# Patient Record
Sex: MALE | Race: WHITE | NOT HISPANIC OR LATINO | URBAN - METROPOLITAN AREA
[De-identification: names, ages, dates, MRNs, and addresses within clinical notes are randomized per-mention and may not be internally consistent; named-entity substitution may affect disease eponyms.]

---

## 2019-07-04 ENCOUNTER — INPATIENT (INPATIENT)
Facility: HOSPITAL | Age: 84
LOS: 4 days | Discharge: HOME HEALTH | End: 2019-07-09
Attending: INTERNAL MEDICINE | Admitting: INTERNAL MEDICINE
Payer: MEDICARE

## 2019-07-04 VITALS — WEIGHT: 184.97 LBS | HEIGHT: 68 IN

## 2019-07-04 LAB
ALBUMIN SERPL ELPH-MCNC: 3 G/DL — LOW (ref 3.3–5)
ALP SERPL-CCNC: 157 U/L — HIGH (ref 40–120)
ALT FLD-CCNC: 28 U/L — SIGNIFICANT CHANGE UP (ref 12–78)
ANION GAP SERPL CALC-SCNC: 8 MMOL/L — SIGNIFICANT CHANGE UP (ref 5–17)
APPEARANCE UR: CLEAR — SIGNIFICANT CHANGE UP
APTT BLD: 46.1 SEC — HIGH (ref 27.5–36.3)
AST SERPL-CCNC: 27 U/L — SIGNIFICANT CHANGE UP (ref 15–37)
BASOPHILS # BLD AUTO: 0.01 K/UL — SIGNIFICANT CHANGE UP (ref 0–0.2)
BASOPHILS NFR BLD AUTO: 0.1 % — SIGNIFICANT CHANGE UP (ref 0–2)
BILIRUB SERPL-MCNC: 0.9 MG/DL — SIGNIFICANT CHANGE UP (ref 0.2–1.2)
BILIRUB UR-MCNC: NEGATIVE — SIGNIFICANT CHANGE UP
BUN SERPL-MCNC: 18 MG/DL — SIGNIFICANT CHANGE UP (ref 7–23)
CALCIUM SERPL-MCNC: 9.3 MG/DL — SIGNIFICANT CHANGE UP (ref 8.5–10.1)
CHLORIDE SERPL-SCNC: 101 MMOL/L — SIGNIFICANT CHANGE UP (ref 96–108)
CO2 SERPL-SCNC: 27 MMOL/L — SIGNIFICANT CHANGE UP (ref 22–31)
COLOR SPEC: YELLOW — SIGNIFICANT CHANGE UP
CREAT SERPL-MCNC: 1.21 MG/DL — SIGNIFICANT CHANGE UP (ref 0.5–1.3)
DIFF PNL FLD: ABNORMAL
EOSINOPHIL # BLD AUTO: 0.01 K/UL — SIGNIFICANT CHANGE UP (ref 0–0.5)
EOSINOPHIL NFR BLD AUTO: 0.1 % — SIGNIFICANT CHANGE UP (ref 0–6)
ERYTHROCYTE [SEDIMENTATION RATE] IN BLOOD: 90 MM/HR — HIGH (ref 0–20)
GLUCOSE SERPL-MCNC: 114 MG/DL — HIGH (ref 70–99)
GLUCOSE UR QL: NEGATIVE MG/DL — SIGNIFICANT CHANGE UP
HCT VFR BLD CALC: 37.7 % — LOW (ref 39–50)
HGB BLD-MCNC: 12.4 G/DL — LOW (ref 13–17)
IMM GRANULOCYTES NFR BLD AUTO: 0.4 % — SIGNIFICANT CHANGE UP (ref 0–1.5)
INR BLD: 3.08 RATIO — HIGH (ref 0.88–1.16)
KETONES UR-MCNC: NEGATIVE — SIGNIFICANT CHANGE UP
LACTATE SERPL-SCNC: 1.2 MMOL/L — SIGNIFICANT CHANGE UP (ref 0.7–2)
LEUKOCYTE ESTERASE UR-ACNC: NEGATIVE — SIGNIFICANT CHANGE UP
LYMPHOCYTES # BLD AUTO: 1.19 K/UL — SIGNIFICANT CHANGE UP (ref 1–3.3)
LYMPHOCYTES # BLD AUTO: 11.9 % — LOW (ref 13–44)
MCHC RBC-ENTMCNC: 28.8 PG — SIGNIFICANT CHANGE UP (ref 27–34)
MCHC RBC-ENTMCNC: 32.9 GM/DL — SIGNIFICANT CHANGE UP (ref 32–36)
MCV RBC AUTO: 87.7 FL — SIGNIFICANT CHANGE UP (ref 80–100)
MONOCYTES # BLD AUTO: 1.03 K/UL — HIGH (ref 0–0.9)
MONOCYTES NFR BLD AUTO: 10.3 % — SIGNIFICANT CHANGE UP (ref 2–14)
NEUTROPHILS # BLD AUTO: 7.68 K/UL — HIGH (ref 1.8–7.4)
NEUTROPHILS NFR BLD AUTO: 77.2 % — HIGH (ref 43–77)
NITRITE UR-MCNC: NEGATIVE — SIGNIFICANT CHANGE UP
PH UR: 6 — SIGNIFICANT CHANGE UP (ref 5–8)
PLATELET # BLD AUTO: 222 K/UL — SIGNIFICANT CHANGE UP (ref 150–400)
POTASSIUM SERPL-MCNC: 3.4 MMOL/L — LOW (ref 3.5–5.3)
POTASSIUM SERPL-SCNC: 3.4 MMOL/L — LOW (ref 3.5–5.3)
PROT SERPL-MCNC: 8.2 GM/DL — SIGNIFICANT CHANGE UP (ref 6–8.3)
PROT UR-MCNC: 30 MG/DL
PROTHROM AB SERPL-ACNC: 35.4 SEC — HIGH (ref 10–12.9)
RBC # BLD: 4.3 M/UL — SIGNIFICANT CHANGE UP (ref 4.2–5.8)
RBC # FLD: 13.5 % — SIGNIFICANT CHANGE UP (ref 10.3–14.5)
SODIUM SERPL-SCNC: 136 MMOL/L — SIGNIFICANT CHANGE UP (ref 135–145)
SP GR SPEC: 1.01 — SIGNIFICANT CHANGE UP (ref 1.01–1.02)
UROBILINOGEN FLD QL: NEGATIVE MG/DL — SIGNIFICANT CHANGE UP
WBC # BLD: 9.96 K/UL — SIGNIFICANT CHANGE UP (ref 3.8–10.5)
WBC # FLD AUTO: 9.96 K/UL — SIGNIFICANT CHANGE UP (ref 3.8–10.5)

## 2019-07-04 PROCEDURE — 71260 CT THORAX DX C+: CPT | Mod: 26

## 2019-07-04 PROCEDURE — 73702 CT LWR EXTREMITY W/O&W/DYE: CPT | Mod: 26,LT

## 2019-07-04 PROCEDURE — 73562 X-RAY EXAM OF KNEE 3: CPT | Mod: 26,LT

## 2019-07-04 PROCEDURE — 71046 X-RAY EXAM CHEST 2 VIEWS: CPT | Mod: 26

## 2019-07-04 PROCEDURE — 99285 EMERGENCY DEPT VISIT HI MDM: CPT

## 2019-07-04 PROCEDURE — 73552 X-RAY EXAM OF FEMUR 2/>: CPT | Mod: 26,LT

## 2019-07-04 PROCEDURE — 73502 X-RAY EXAM HIP UNI 2-3 VIEWS: CPT | Mod: 26,LT

## 2019-07-04 PROCEDURE — 93010 ELECTROCARDIOGRAM REPORT: CPT

## 2019-07-04 PROCEDURE — 74177 CT ABD & PELVIS W/CONTRAST: CPT | Mod: 26

## 2019-07-04 PROCEDURE — 73590 X-RAY EXAM OF LOWER LEG: CPT | Mod: 26,LT

## 2019-07-04 RX ORDER — TRAMADOL HYDROCHLORIDE 50 MG/1
75 TABLET ORAL EVERY 6 HOURS
Refills: 0 | Status: DISCONTINUED | OUTPATIENT
Start: 2019-07-04 | End: 2019-07-09

## 2019-07-04 RX ORDER — ACETAMINOPHEN 500 MG
650 TABLET ORAL EVERY 4 HOURS
Refills: 0 | Status: DISCONTINUED | OUTPATIENT
Start: 2019-07-04 | End: 2019-07-06

## 2019-07-04 RX ORDER — ACETAMINOPHEN 500 MG
975 TABLET ORAL ONCE
Refills: 0 | Status: COMPLETED | OUTPATIENT
Start: 2019-07-04 | End: 2019-07-04

## 2019-07-04 RX ORDER — ONDANSETRON 8 MG/1
4 TABLET, FILM COATED ORAL EVERY 6 HOURS
Refills: 0 | Status: DISCONTINUED | OUTPATIENT
Start: 2019-07-04 | End: 2019-07-09

## 2019-07-04 RX ORDER — OXYCODONE HYDROCHLORIDE 5 MG/1
5 TABLET ORAL EVERY 4 HOURS
Refills: 0 | Status: DISCONTINUED | OUTPATIENT
Start: 2019-07-04 | End: 2019-07-09

## 2019-07-04 RX ORDER — MORPHINE SULFATE 50 MG/1
4 CAPSULE, EXTENDED RELEASE ORAL ONCE
Refills: 0 | Status: DISCONTINUED | OUTPATIENT
Start: 2019-07-04 | End: 2019-07-04

## 2019-07-04 RX ORDER — DOCUSATE SODIUM 100 MG
100 CAPSULE ORAL THREE TIMES A DAY
Refills: 0 | Status: DISCONTINUED | OUTPATIENT
Start: 2019-07-04 | End: 2019-07-09

## 2019-07-04 RX ORDER — VANCOMYCIN HCL 1 G
1000 VIAL (EA) INTRAVENOUS ONCE
Refills: 0 | Status: COMPLETED | OUTPATIENT
Start: 2019-07-04 | End: 2019-07-04

## 2019-07-04 RX ORDER — SODIUM CHLORIDE 9 MG/ML
2500 INJECTION INTRAMUSCULAR; INTRAVENOUS; SUBCUTANEOUS ONCE
Refills: 0 | Status: COMPLETED | OUTPATIENT
Start: 2019-07-04 | End: 2019-07-04

## 2019-07-04 RX ORDER — SENNA PLUS 8.6 MG/1
2 TABLET ORAL AT BEDTIME
Refills: 0 | Status: DISCONTINUED | OUTPATIENT
Start: 2019-07-04 | End: 2019-07-09

## 2019-07-04 RX ORDER — PIPERACILLIN AND TAZOBACTAM 4; .5 G/20ML; G/20ML
3.38 INJECTION, POWDER, LYOPHILIZED, FOR SOLUTION INTRAVENOUS ONCE
Refills: 0 | Status: COMPLETED | OUTPATIENT
Start: 2019-07-04 | End: 2019-07-04

## 2019-07-04 RX ADMIN — PIPERACILLIN AND TAZOBACTAM 3.38 GRAM(S): 4; .5 INJECTION, POWDER, LYOPHILIZED, FOR SOLUTION INTRAVENOUS at 21:00

## 2019-07-04 RX ADMIN — PIPERACILLIN AND TAZOBACTAM 200 GRAM(S): 4; .5 INJECTION, POWDER, LYOPHILIZED, FOR SOLUTION INTRAVENOUS at 20:41

## 2019-07-04 RX ADMIN — MORPHINE SULFATE 4 MILLIGRAM(S): 50 CAPSULE, EXTENDED RELEASE ORAL at 20:41

## 2019-07-04 RX ADMIN — Medication 250 MILLIGRAM(S): at 21:24

## 2019-07-04 RX ADMIN — MORPHINE SULFATE 4 MILLIGRAM(S): 50 CAPSULE, EXTENDED RELEASE ORAL at 21:00

## 2019-07-04 RX ADMIN — Medication 975 MILLIGRAM(S): at 20:30

## 2019-07-04 RX ADMIN — SODIUM CHLORIDE 2500 MILLILITER(S): 9 INJECTION INTRAMUSCULAR; INTRAVENOUS; SUBCUTANEOUS at 20:12

## 2019-07-04 RX ADMIN — Medication 975 MILLIGRAM(S): at 20:31

## 2019-07-04 NOTE — H&P ADULT - ASSESSMENT
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 The patient states that he has had left knee > hip pain over the last several days but has since progressively worsened preventing him from walking. At baseline the patient is a community ambulator with the assistance of a cane. 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 The patient states that he has had left knee > hip pain over the last several days but has since progressively worsened preventing him from walking. At baseline the patient is a community ambulator with the assistance of a cane. Vitals in triage => /80, /min, RR 22/min, Tmax 102.2'F. Labs => ESR 90, Hgb/Hct 12.4/37.7, INR 3.08, K 3.4, Alb 3.0. CT Chest/ABD/Pelvis => Hepatic steatosis. Punctate non-obstructing kidney stones bilaterally. Bilateral cortical and parapelvic cysts. Mild prostatic enlargement. Inferior vena cava filter is present. Surgical hardware in the left acetabulum and ischium. Severe osteoarthritis in the left hip. 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 The patient states that he has had left knee > hip pain over the last several days but has since progressively worsened preventing him from walking. At baseline the patient is a community ambulator with the assistance of a cane. Vitals in triage => /80, /min, RR 22/min, Tmax 102.2'F. Labs => ESR 90, Hgb/Hct 12.4/37.7, INR 3.08, K 3.4, Alb 3.0. CT Chest/ABD/Pelvis => Hepatic steatosis. Punctate non-obstructing kidney stones bilaterally. Bilateral cortical and parapelvic cysts. Mild prostatic enlargement. Inferior vena cava filter is present. Surgical hardware in the left acetabulum and ischium. Severe osteoarthritis in the left hip. In the ED the patient received Acetaminophen 975mg PO x 1, Morphine 4mg IVP x 1, Piperacillin/tazobactam 3.375g IVPB x 1, Vancomycin 1g IVPB x 1, and NS x 2.5L x 1. 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 The patient states that he has had left knee > hip pain over the last several days but has since progressively worsened preventing him from walking. At baseline the patient is a community ambulator with the assistance of a cane. Vitals in triage => /80, /min, RR 22/min, Tmax 102.2'F. Labs => ESR 90, Hgb/Hct 12.4/37.7, INR 3.08, K 3.4, Alb 3.0. CT Chest/ABD/Pelvis => Hepatic steatosis. Punctate non-obstructing kidney stones bilaterally. Bilateral cortical and parapelvic cysts. Mild prostatic enlargement. Inferior vena cava filter is present. Surgical hardware in the left acetabulum and ischium. Severe osteoarthritis in the left hip. In the ED the patient received Acetaminophen 975mg PO x 1, Morphine 4mg IVP x 1, Piperacillin/tazobactam 3.375g IVPB x 1, Vancomycin 1g IVPB x 1, and NS x 2.5L x 1.    #Sepsis of Unknown Etiology  ~admit to Medicine  ~DDx includes septic arthritis involving left knee vs recurrent vertebral osteomyelitis  ~ESR and CRP grossly elevated  ~f/u w/ Orthopedics  ~f/u PAN C+S  ~given Piperacillin/tazobactam 3.375g IVPB x 1 + Vancomycin 1g IVPB x 1 in the ED  ~cont. IV abx  ~f/u w/ formal ID consultation in the am   ~Initial LA was 1.2  ~cont. IV hydration  ~BP has remained stable since admission    #LLE DVT  ~cont. VKA therapy per protocol  ~please dose accordingly    #BPH  ~cont. Tamsulosin 0.4mg po qhs    #Hypertension  ~cont. Irbesartan 300mg po daily  ~cont. Amlodipine 7.5mg po daily  ~cont. Metoprolol 50mg po q12h    #Hyperlipidemia  ~cont. statin therapy w/ Atorvastatin 20mg po qhs    #Vte ppx  ~IMPROVE VTE Risk Score is 5  [X] Previous VTE                                                  3  [  ] Thrombophilia                                               2  [  ] Lower limb paralysis                                      2        (unable to hold up >15 seconds)    [  ] Current Cancer                                              2         (within 6 months)  [X] Immobilization > 24 hrs                                1  [  ] ICU/CCU stay > 24 hours                              1  [X] Age > 60                                                      1  ~cont. VKA therapy as above. 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 The patient states that he has had left knee > hip pain over the last several days but has since progressively worsened preventing him from walking. At baseline the patient is a community ambulator with the assistance of a cane. Vitals in triage => /80, /min, RR 22/min, Tmax 102.2'F. Labs => ESR 90, Hgb/Hct 12.4/37.7, INR 3.08, K 3.4, Alb 3.0. CT Chest/ABD/Pelvis => Hepatic steatosis. Punctate non-obstructing kidney stones bilaterally. Bilateral cortical and parapelvic cysts. Mild prostatic enlargement. Inferior vena cava filter is present. Surgical hardware in the left acetabulum and ischium. Severe osteoarthritis in the left hip. In the ED the patient received Acetaminophen 975mg PO x 1, Morphine 4mg IVP x 1, Piperacillin/tazobactam 3.375g IVPB x 1, Vancomycin 1g IVPB x 1, and NS x 2.5L x 1.    #Sepsis of Unknown Etiology  ~admit to Medicine  ~DDx includes septic arthritis involving left knee vs recurrent vertebral osteomyelitis  ~ESR and CRP grossly elevated  ~f/u w/ Orthopedics  ~f/u PAN C+S  ~given Piperacillin/tazobactam 3.375g IVPB x 1 + Vancomycin 1g IVPB x 1 in the ED  ~cont. IV abx  ~f/u w/ formal ID consultation in the am   ~Initial LA was 1.2  ~cont. IV hydration  ~BP has remained stable since admission  ~f/u MRI C/T/L spine w/w/o contrast    #LLE DVT  ~cont. VKA therapy per protocol  ~please dose accordingly    #BPH  ~cont. Tamsulosin 0.4mg po qhs    #Hypertension  ~cont. Irbesartan 300mg po daily  ~cont. Amlodipine 7.5mg po daily  ~cont. Metoprolol 50mg po q12h    #Hyperlipidemia  ~cont. statin therapy w/ Atorvastatin 20mg po qhs    #Vte ppx  ~IMPROVE VTE Risk Score is 5  [X] Previous VTE                                                  3  [  ] Thrombophilia                                               2  [  ] Lower limb paralysis                                      2        (unable to hold up >15 seconds)    [  ] Current Cancer                                              2         (within 6 months)  [X] Immobilization > 24 hrs                                1  [  ] ICU/CCU stay > 24 hours                              1  [X] Age > 60                                                      1  ~cont. VKA therapy as above.

## 2019-07-04 NOTE — ED PROVIDER NOTE - PHYSICAL EXAMINATION
*GEN: NAD; well appearing; A+O x3   *HEAD: NC/AT   *EYES/NOSE: PERRL & EOMI b/l  *THROAT: airway patent, moist mucous membranes  *NECK: Neck supple, no masses  *PULMONARY: Rales in right lower lobe.  *CARDIAC: s1s2, regular rhythm, no Murmur  *ABDOMEN:  ND, NT, soft, no guarding, no rebound, no masses   *BACK: no CVA tenderness, Normal  spine   *EXTREMITIES: symmetric pulses, 2+ dp & radial pulses, capillary refill < 2 seconds, no cyanosis, no edema   *SKIN: no rash or bruising   *NEUROLOGIC: alert, CN 2-12 intact, moves all 4 extremities, full active & passive ROM in all extremities, normal baseline gait  *PSYCH: insight and judgment nl, memory nl, affect nl, thought nl

## 2019-07-04 NOTE — ED ADULT NURSE NOTE - OBJECTIVE STATEMENT
pt pw c/o fever/chills/pain in left knee and hip and cough for a few days. Pt has hx of osteomyelitis, left hip dvt. Left knee is red/painful to palpation.  pt has dry cough for one week.

## 2019-07-04 NOTE — ED PROVIDER NOTE - PROGRESS NOTE DETAILS
ortho doesn't think pt has septic arthritis, ? tibial osteomyelitis so will get ct. will get pan scan to eval f unknown origin, results of which hospilitilist will f/u

## 2019-07-04 NOTE — H&P ADULT - NSICDXPASTSURGICALHX_GEN_ALL_CORE_FT
PAST SURGICAL HISTORY:  Presence of IVC filter PAST SURGICAL HISTORY:  History of hip surgery     Presence of IVC filter

## 2019-07-04 NOTE — ED ADULT NURSE NOTE - NSIMPLEMENTINTERV_GEN_ALL_ED
Implemented All Fall with Harm Risk Interventions:  [place] to call system. Call bell, personal items and telephone within reach. Instruct patient to call for assistance. Room bathroom lighting operational. Non-slip footwear when patient is off stretcher. Physically safe environment: no spills, clutter or unnecessary equipment. Stretcher in lowest position, wheels locked, appropriate side rails in place. Provide visual cue, wrist band, yellow gown, etc. Monitor gait and stability. Monitor for mental status changes and reorient to person, place, and time. Review medications for side effects contributing to fall risk. Reinforce activity limits and safety measures with patient and family. Provide visual clues: red socks.

## 2019-07-04 NOTE — ED ADULT NURSE NOTE - NSSEPSISCRITERIAMET_ED_A_ED
Is This A New Presentation, Or A Follow-Up?: Skin Lesions
How Severe Is Your Skin Lesion?: mild
Have Your Skin Lesions Been Treated?: not been treated
Abnormal VS & WBC

## 2019-07-04 NOTE — ED PROVIDER NOTE - OBJECTIVE STATEMENT
Pertinent HPI/PMH/PSH/FHx/SHx and Review of Systems contained within  HPI: 82 y/o male p/w fever x 3 days and left thigh and knee pain x1 month. Left thigh and knee pain worsened over the last 3 days, preventing pt from walking. Pt's left knee feeling hot and erythematous. Pt notes he has a blood clot in leg, diagnosed 2 months ago. No falls, No trauma. +dry cough 1 month. Decreased PO intake the past 2 days. Normally ambulates with cane. Nonsmoker. No recent hospitalizations. NKDA. No other complaints at this time. PCP: Marc. Orthopedist: Dr. Mcdonald.   PMH/PSH: arthritis, DVT on Coumadin, HTN, vertebral osteomyelitis  ROS positive for: +LLE pain, +fever.  ROS negative for: Chest pain, SOB, Nausea, vomiting, diarrhea, abdominal pain, dysuria.

## 2019-07-04 NOTE — H&P ADULT - NSICDXPASTMEDICALHX_GEN_ALL_CORE_FT
PAST MEDICAL HISTORY:  Arthritis     DVT (deep venous thrombosis)     HTN (hypertension)     Osteomyelitis PAST MEDICAL HISTORY:  Arthritis     DVT (deep venous thrombosis)     HTN (hypertension)     Osteomyelitis Vertebral

## 2019-07-04 NOTE — ED PROVIDER NOTE - NS ED ROS FT
Review of Systems:  	•	CONSTITUTIONAL: +fever  	•	SKIN: no rash  	•	RESPIRATORY: no shortness of breath  	•	CARDIAC: no chest pain, no palpitations  	•	GI:  no abd pain, no nausea, no vomiting, no diarrhea  	•	GENITO-URINARY:  no dysuria; no hematuria    	•	MUSCULOSKELETAL:  no back pain. +LLE pain.   	•	NEUROLOGIC: no weakness  	•	ALLERGY: no rhinitis  	•	PSYSCHIATRIC: no anxiety

## 2019-07-04 NOTE — H&P ADULT - HISTORY OF PRESENT ILLNESS
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 The patient states that he has had left knee > hip pain over the last several days but has since progressively worsened preventing him from walking. At baseline the patient is a community ambulator with the assistance of a cane. 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 The patient states that he has had left knee > hip pain over the last several days but has since progressively worsened preventing him from walking. At baseline the patient is a community ambulator with the assistance of a cane. Vitals in triage => /80, /min, RR 22/min, Tmax 102.2'F. Labs => ESR 90, Hgb/Hct 12.4/37.7, INR 3.08, K 3.4, Alb 3.0. CT Chest/ABD/Pelvis => Hepatic steatosis. Punctate non-obstructing kidney stones bilaterally. Bilateral cortical and parapelvic cysts. Mild prostatic enlargement. Inferior vena cava filter is present. Surgical hardware in the left acetabulum and ischium. Severe osteoarthritis in the left hip. 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 The patient states that he has had left knee > hip pain over the last several days but has since progressively worsened preventing him from walking. At baseline the patient is a community ambulator with the assistance of a cane. Vitals in triage => /80, /min, RR 22/min, Tmax 102.2'F. Labs => ESR 90, Hgb/Hct 12.4/37.7, INR 3.08, K 3.4, Alb 3.0. CT Chest/ABD/Pelvis => Hepatic steatosis. Punctate non-obstructing kidney stones bilaterally. Bilateral cortical and parapelvic cysts. Mild prostatic enlargement. Inferior vena cava filter is present. Surgical hardware in the left acetabulum and ischium. Severe osteoarthritis in the left hip. In the ED the patient received Acetaminophen 975mg PO x 1, Morphine 4mg IVP x 1, Piperacillin/tazobactam 3.375g IVPB x 1, Vancomycin 1g IVPB x 1, and NS x 2.5L x 1.

## 2019-07-04 NOTE — ED ADULT TRIAGE NOTE - CHIEF COMPLAINT QUOTE
Pt presents to ER c/o fever/dry cough and left lower extremity pain/hot to touch. Onset of symptoms began 3 days ago. Pt reports having confirmed blood clot in LLE. Denies SOB. Tmax at home was 102

## 2019-07-04 NOTE — ED PROVIDER NOTE - CLINICAL SUMMARY MEDICAL DECISION MAKING FREE TEXT BOX
Pt presents with fever x3 days associated with worsening of left knee pain and erythema. Less likely PNA, possible osteomyelitis vs septic arthritis left knee. Will give abx and consult ortho due to pt having hx of surgical intervention left knee. Pt will likely need admission.

## 2019-07-04 NOTE — H&P ADULT - NSHPPHYSICALEXAM_GEN_ALL_CORE
Vital Signs Last 24 Hrs  T(C): 37.2 (04 Jul 2019 21:00), Max: 39 (04 Jul 2019 20:00)  T(F): 99 (04 Jul 2019 21:00), Max: 102.2 (04 Jul 2019 20:00)  HR: 89 (04 Jul 2019 21:00) (89 - 113)  BP: 147/75 (04 Jul 2019 21:00) (147/75 - 170/87)  RR: 18 (04 Jul 2019 21:00) (18 - 24)  SpO2: 96% (04 Jul 2019 21:00) (96% - 98%)

## 2019-07-05 DIAGNOSIS — M46.49 DISCITIS, UNSPECIFIED, MULTIPLE SITES IN SPINE: ICD-10-CM

## 2019-07-05 DIAGNOSIS — I82.512 CHRONIC EMBOLISM AND THROMBOSIS OF LEFT FEMORAL VEIN: ICD-10-CM

## 2019-07-05 DIAGNOSIS — M19.90 UNSPECIFIED OSTEOARTHRITIS, UNSPECIFIED SITE: ICD-10-CM

## 2019-07-05 DIAGNOSIS — M16.12 UNILATERAL PRIMARY OSTEOARTHRITIS, LEFT HIP: ICD-10-CM

## 2019-07-05 DIAGNOSIS — Z98.890 OTHER SPECIFIED POSTPROCEDURAL STATES: Chronic | ICD-10-CM

## 2019-07-05 DIAGNOSIS — I10 ESSENTIAL (PRIMARY) HYPERTENSION: ICD-10-CM

## 2019-07-05 DIAGNOSIS — Z95.828 PRESENCE OF OTHER VASCULAR IMPLANTS AND GRAFTS: Chronic | ICD-10-CM

## 2019-07-05 LAB
ADD ON TEST-SPECIMEN IN LAB: SIGNIFICANT CHANGE UP
ALBUMIN SERPL ELPH-MCNC: 2.5 G/DL — LOW (ref 3.3–5)
ALP SERPL-CCNC: 126 U/L — HIGH (ref 40–120)
ALT FLD-CCNC: 19 U/L — SIGNIFICANT CHANGE UP (ref 12–78)
ANION GAP SERPL CALC-SCNC: 9 MMOL/L — SIGNIFICANT CHANGE UP (ref 5–17)
APTT BLD: 45.5 SEC — HIGH (ref 27.5–36.3)
APTT BLD: 46.5 SEC — HIGH (ref 27.5–36.3)
AST SERPL-CCNC: 14 U/L — LOW (ref 15–37)
B PERT IGG+IGM PNL SER: ABNORMAL
BASOPHILS # BLD AUTO: 0.01 K/UL — SIGNIFICANT CHANGE UP (ref 0–0.2)
BASOPHILS NFR BLD AUTO: 0.1 % — SIGNIFICANT CHANGE UP (ref 0–2)
BILIRUB SERPL-MCNC: 0.8 MG/DL — SIGNIFICANT CHANGE UP (ref 0.2–1.2)
BUN SERPL-MCNC: 14 MG/DL — SIGNIFICANT CHANGE UP (ref 7–23)
CALCIUM SERPL-MCNC: 8.9 MG/DL — SIGNIFICANT CHANGE UP (ref 8.5–10.1)
CHLORIDE SERPL-SCNC: 107 MMOL/L — SIGNIFICANT CHANGE UP (ref 96–108)
CO2 SERPL-SCNC: 26 MMOL/L — SIGNIFICANT CHANGE UP (ref 22–31)
COLOR FLD: YELLOW — SIGNIFICANT CHANGE UP
CREAT SERPL-MCNC: 1.04 MG/DL — SIGNIFICANT CHANGE UP (ref 0.5–1.3)
CRP SERPL-MCNC: 12.22 MG/DL — HIGH (ref 0–0.4)
CRP SERPL-MCNC: 13.85 MG/DL — HIGH (ref 0–0.4)
EOSINOPHIL # BLD AUTO: 0.02 K/UL — SIGNIFICANT CHANGE UP (ref 0–0.5)
EOSINOPHIL NFR BLD AUTO: 0.2 % — SIGNIFICANT CHANGE UP (ref 0–6)
ERYTHROCYTE [SEDIMENTATION RATE] IN BLOOD: 101 MM/HR — HIGH (ref 0–20)
FLUID INTAKE SUBSTANCE CLASS: SIGNIFICANT CHANGE UP
FLUID SEGMENTED GRANULOCYTES: 57 % — SIGNIFICANT CHANGE UP
GLUCOSE SERPL-MCNC: 114 MG/DL — HIGH (ref 70–99)
HCT VFR BLD CALC: 33.1 % — LOW (ref 39–50)
HGB BLD-MCNC: 10.9 G/DL — LOW (ref 13–17)
IMM GRANULOCYTES NFR BLD AUTO: 0.5 % — SIGNIFICANT CHANGE UP (ref 0–1.5)
INR BLD: 3.24 RATIO — HIGH (ref 0.88–1.16)
INR BLD: 3.26 RATIO — HIGH (ref 0.88–1.16)
LACTATE SERPL-SCNC: 0.8 MMOL/L — SIGNIFICANT CHANGE UP (ref 0.7–2)
LYMPHOCYTES # BLD AUTO: 0.67 K/UL — LOW (ref 1–3.3)
LYMPHOCYTES # BLD AUTO: 8.2 % — LOW (ref 13–44)
LYMPHOCYTES # FLD: 23 % — SIGNIFICANT CHANGE UP
MAGNESIUM SERPL-MCNC: 2 MG/DL — SIGNIFICANT CHANGE UP (ref 1.6–2.6)
MAGNESIUM SERPL-MCNC: 2.3 MG/DL — SIGNIFICANT CHANGE UP (ref 1.6–2.6)
MCHC RBC-ENTMCNC: 28.9 PG — SIGNIFICANT CHANGE UP (ref 27–34)
MCHC RBC-ENTMCNC: 32.9 GM/DL — SIGNIFICANT CHANGE UP (ref 32–36)
MCV RBC AUTO: 87.8 FL — SIGNIFICANT CHANGE UP (ref 80–100)
MONOCYTES # BLD AUTO: 0.85 K/UL — SIGNIFICANT CHANGE UP (ref 0–0.9)
MONOCYTES NFR BLD AUTO: 10.4 % — SIGNIFICANT CHANGE UP (ref 2–14)
MONOS+MACROS # FLD: 20 % — SIGNIFICANT CHANGE UP
NEUTROPHILS # BLD AUTO: 6.55 K/UL — SIGNIFICANT CHANGE UP (ref 1.8–7.4)
NEUTROPHILS NFR BLD AUTO: 80.6 % — HIGH (ref 43–77)
PLATELET # BLD AUTO: 182 K/UL — SIGNIFICANT CHANGE UP (ref 150–400)
POTASSIUM SERPL-MCNC: 3.2 MMOL/L — LOW (ref 3.5–5.3)
POTASSIUM SERPL-SCNC: 3.2 MMOL/L — LOW (ref 3.5–5.3)
PROT SERPL-MCNC: 6.8 GM/DL — SIGNIFICANT CHANGE UP (ref 6–8.3)
PROTHROM AB SERPL-ACNC: 37.3 SEC — HIGH (ref 10–12.9)
PROTHROM AB SERPL-ACNC: 37.5 SEC — HIGH (ref 10–12.9)
RBC # BLD: 3.77 M/UL — LOW (ref 4.2–5.8)
RBC # FLD: 13.4 % — SIGNIFICANT CHANGE UP (ref 10.3–14.5)
RCV VOL RI: 4200 /UL — HIGH (ref 0–5)
SODIUM SERPL-SCNC: 142 MMOL/L — SIGNIFICANT CHANGE UP (ref 135–145)
SYNOVIAL CRYSTALS CLARITY: ABNORMAL
SYNOVIAL CRYSTALS COLOR: YELLOW
SYNOVIAL CRYSTALS ID: SIGNIFICANT CHANGE UP
SYNOVIAL CRYSTALS TUBE: SIGNIFICANT CHANGE UP
TOTAL NUCLEATED CELL COUNT, BODY FLUID: 3880 /UL — SIGNIFICANT CHANGE UP (ref 0–5)
TUBE TYPE: SIGNIFICANT CHANGE UP
WBC # BLD: 8.14 K/UL — SIGNIFICANT CHANGE UP (ref 3.8–10.5)
WBC # FLD AUTO: 8.14 K/UL — SIGNIFICANT CHANGE UP (ref 3.8–10.5)

## 2019-07-05 PROCEDURE — 72158 MRI LUMBAR SPINE W/O & W/DYE: CPT | Mod: 26

## 2019-07-05 PROCEDURE — 72156 MRI NECK SPINE W/O & W/DYE: CPT | Mod: 26

## 2019-07-05 PROCEDURE — 72157 MRI CHEST SPINE W/O & W/DYE: CPT | Mod: 26

## 2019-07-05 RX ORDER — METOPROLOL TARTRATE 50 MG
1 TABLET ORAL
Qty: 0 | Refills: 0 | DISCHARGE

## 2019-07-05 RX ORDER — AMLODIPINE BESYLATE 2.5 MG/1
7.5 TABLET ORAL DAILY
Refills: 0 | Status: DISCONTINUED | OUTPATIENT
Start: 2019-07-05 | End: 2019-07-09

## 2019-07-05 RX ORDER — CHOLECALCIFEROL (VITAMIN D3) 125 MCG
1000 CAPSULE ORAL DAILY
Refills: 0 | Status: DISCONTINUED | OUTPATIENT
Start: 2019-07-05 | End: 2019-07-09

## 2019-07-05 RX ORDER — WARFARIN SODIUM 2.5 MG/1
1 TABLET ORAL
Qty: 0 | Refills: 0 | DISCHARGE

## 2019-07-05 RX ORDER — TAMSULOSIN HYDROCHLORIDE 0.4 MG/1
0.4 CAPSULE ORAL AT BEDTIME
Refills: 0 | Status: DISCONTINUED | OUTPATIENT
Start: 2019-07-05 | End: 2019-07-09

## 2019-07-05 RX ORDER — SODIUM CHLORIDE 9 MG/ML
1000 INJECTION INTRAMUSCULAR; INTRAVENOUS; SUBCUTANEOUS
Refills: 0 | Status: COMPLETED | OUTPATIENT
Start: 2019-07-05 | End: 2019-07-05

## 2019-07-05 RX ORDER — LOSARTAN POTASSIUM 100 MG/1
100 TABLET, FILM COATED ORAL DAILY
Refills: 0 | Status: DISCONTINUED | OUTPATIENT
Start: 2019-07-05 | End: 2019-07-09

## 2019-07-05 RX ORDER — POTASSIUM CHLORIDE 20 MEQ
40 PACKET (EA) ORAL ONCE
Refills: 0 | Status: COMPLETED | OUTPATIENT
Start: 2019-07-05 | End: 2019-07-05

## 2019-07-05 RX ORDER — METOPROLOL TARTRATE 50 MG
50 TABLET ORAL
Refills: 0 | Status: DISCONTINUED | OUTPATIENT
Start: 2019-07-05 | End: 2019-07-09

## 2019-07-05 RX ORDER — PIPERACILLIN AND TAZOBACTAM 4; .5 G/20ML; G/20ML
3.38 INJECTION, POWDER, LYOPHILIZED, FOR SOLUTION INTRAVENOUS EVERY 8 HOURS
Refills: 0 | Status: DISCONTINUED | OUTPATIENT
Start: 2019-07-05 | End: 2019-07-05

## 2019-07-05 RX ORDER — POTASSIUM CHLORIDE 20 MEQ
20 PACKET (EA) ORAL ONCE
Refills: 0 | Status: COMPLETED | OUTPATIENT
Start: 2019-07-05 | End: 2019-07-05

## 2019-07-05 RX ORDER — SODIUM CHLORIDE 9 MG/ML
1000 INJECTION, SOLUTION INTRAVENOUS
Refills: 0 | Status: DISCONTINUED | OUTPATIENT
Start: 2019-07-05 | End: 2019-07-05

## 2019-07-05 RX ORDER — MELOXICAM 15 MG/1
1 TABLET ORAL
Qty: 0 | Refills: 0 | DISCHARGE

## 2019-07-05 RX ORDER — ATORVASTATIN CALCIUM 80 MG/1
20 TABLET, FILM COATED ORAL AT BEDTIME
Refills: 0 | Status: DISCONTINUED | OUTPATIENT
Start: 2019-07-05 | End: 2019-07-09

## 2019-07-05 RX ORDER — VANCOMYCIN HCL 1 G
750 VIAL (EA) INTRAVENOUS EVERY 12 HOURS
Refills: 0 | Status: DISCONTINUED | OUTPATIENT
Start: 2019-07-05 | End: 2019-07-09

## 2019-07-05 RX ORDER — AMLODIPINE BESYLATE 2.5 MG/1
1 TABLET ORAL
Qty: 0 | Refills: 0 | DISCHARGE

## 2019-07-05 RX ORDER — CEFTRIAXONE 500 MG/1
2000 INJECTION, POWDER, FOR SOLUTION INTRAMUSCULAR; INTRAVENOUS EVERY 24 HOURS
Refills: 0 | Status: DISCONTINUED | OUTPATIENT
Start: 2019-07-05 | End: 2019-07-09

## 2019-07-05 RX ADMIN — SODIUM CHLORIDE 75 MILLILITER(S): 9 INJECTION INTRAMUSCULAR; INTRAVENOUS; SUBCUTANEOUS at 05:48

## 2019-07-05 RX ADMIN — Medication 1000 UNIT(S): at 11:45

## 2019-07-05 RX ADMIN — Medication 250 MILLIGRAM(S): at 17:07

## 2019-07-05 RX ADMIN — CEFTRIAXONE 2000 MILLIGRAM(S): 500 INJECTION, POWDER, FOR SOLUTION INTRAMUSCULAR; INTRAVENOUS at 13:10

## 2019-07-05 RX ADMIN — ATORVASTATIN CALCIUM 20 MILLIGRAM(S): 80 TABLET, FILM COATED ORAL at 21:31

## 2019-07-05 RX ADMIN — Medication 50 MILLIGRAM(S): at 17:07

## 2019-07-05 RX ADMIN — TRAMADOL HYDROCHLORIDE 75 MILLIGRAM(S): 50 TABLET ORAL at 21:27

## 2019-07-05 RX ADMIN — Medication 20 MILLIEQUIVALENT(S): at 05:47

## 2019-07-05 RX ADMIN — Medication 1 TABLET(S): at 11:45

## 2019-07-05 RX ADMIN — TRAMADOL HYDROCHLORIDE 75 MILLIGRAM(S): 50 TABLET ORAL at 14:24

## 2019-07-05 RX ADMIN — PIPERACILLIN AND TAZOBACTAM 25 GRAM(S): 4; .5 INJECTION, POWDER, LYOPHILIZED, FOR SOLUTION INTRAVENOUS at 05:47

## 2019-07-05 RX ADMIN — Medication 50 MILLIGRAM(S): at 05:48

## 2019-07-05 RX ADMIN — AMLODIPINE BESYLATE 7.5 MILLIGRAM(S): 2.5 TABLET ORAL at 05:47

## 2019-07-05 RX ADMIN — TAMSULOSIN HYDROCHLORIDE 0.4 MILLIGRAM(S): 0.4 CAPSULE ORAL at 21:31

## 2019-07-05 RX ADMIN — Medication 40 MILLIEQUIVALENT(S): at 09:33

## 2019-07-05 RX ADMIN — LOSARTAN POTASSIUM 100 MILLIGRAM(S): 100 TABLET, FILM COATED ORAL at 05:48

## 2019-07-05 RX ADMIN — SODIUM CHLORIDE 75 MILLILITER(S): 9 INJECTION, SOLUTION INTRAVENOUS at 11:44

## 2019-07-05 NOTE — CONSULT NOTE ADULT - ASSESSMENT
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 The patient states that he has had left knee > hip pain over the last several days but has since progressively worsened preventing him from walking. At baseline the patient is a community ambulator with the assistance of a cane. In ER he was febrile Tmax 102.2'F. In the ED the patient received Piperacillin/tazobactam 3.375g IVPB, Vancomycin 1g IVPB x 1.    1. Febrile syndrome. Possible sepsis. T6-7 probable discitis/OM. DDD.  -obtain BC x 2  -ortho evaluation appreciated: conservative management at this time  -start vancomycin 750 mg IV q12h and ceftriaxone 2 gm IV qd  -reason for abx use and side effects reviewed with patient; monitor BMP and vancomycin trough levels   -old chart reviewed to assess prior cultures  -monitor temps  -f/u CBC  -supportive care  2. 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 The patient states that he has had left knee > hip pain over the last several days but has since progressively worsened preventing him from walking. At baseline the patient is a community ambulator with the assistance of a cane. In ER he was febrile Tmax 102.2'F. In the ED the patient received Piperacillin/tazobactam 3.375g IVPB, Vancomycin 1g IVPB x 1.    1. Febrile syndrome. Possible sepsis. T6-7 probable discitis/OM. DDD. Left hip pain ?underlying OA ?infection. Left knee effusion.  -obtain BC x 2  -ortho evaluation appreciated: conservative management at this time  -he also has a left knee effusion ?cause ?OA; consider aspiration for fluid analysis  -start vancomycin 750 mg IV q12h and ceftriaxone 2 gm IV qd  -reason for abx use and side effects reviewed with patient; monitor BMP and vancomycin trough levels   -old chart reviewed to assess prior cultures  -monitor temps  -f/u CBC  -supportive care  2. Other issues:   -care per medicine

## 2019-07-05 NOTE — CONSULT NOTE ADULT - ASSESSMENT
83M w/ hx vertebral osteomyelitis (~5yrs ago, tx with IV abx) presents with fever of unknown origin, rule out vertebral OM/epidural abscess/discitis.     -Pt presents with above stated complaints. No neck or back pain. No neurological deficits. No bowel or bladder incontinence. Low suspicion clinically for any spinal infection from an exam standpoint.   -Will FU on MRI of CTL spine w/w/o contrast.   -Recommend IV abx   -FU BCx   -ID consult  -No surgical intervention at this time.   -DW Dr. [place]. 83M w/ hx vertebral osteomyelitis (~5yrs ago, tx with IV abx) presents with fever of unknown origin, rule out vertebral OM/epidural abscess/discitis.     -Pt presents with above stated complaints. No neck or back pain. No neurological deficits. No bowel or bladder incontinence. Low suspicion clinically for any spinal infection from an exam standpoint. however given fever of unknown origin and hx of infx, will follow up on MRIs as this is certainly on the differential.   -Will FU on MRI of CTL spine w/w/o contrast.   -Recommend IV abx   -FU BCx   -ID consult  -No surgical intervention at this time based on current neurological exam.   -Will obtain records from [facility]   - Dr. [place]. 83M w/ hx vertebral osteomyelitis (~5yrs ago, tx with IV abx) presents with fever of unknown origin, rule out vertebral OM/epidural abscess/discitis.     -Pt presents with above stated complaints. No neck or back pain. No neurological deficits. No bowel or bladder incontinence. Despite the Low objective data to clinically support any spinal infection, vertebral OM should still be high on differential.   -Will FU on MRI of CTL spine w/w/o contrast.   -Recommend IV abx   -FU BCx   -ID consult  -No surgical intervention at this time based on current neurological exam.   -Will obtain records from [facility]   - Dr. [place].

## 2019-07-05 NOTE — PROGRESS NOTE ADULT - PROBLEM SELECTOR PLAN 1
-continue with current medication d ~cont. Irbesartan 300mg po daily  ~cont. Amlodipine 7.5mg po daily  ~cont. Metoprolol 50mg po q12h

## 2019-07-05 NOTE — CONSULT NOTE ADULT - SUBJECTIVE AND OBJECTIVE BOX
83y Male presents to [facility] with c/o acute on chronic L knee pain x 3-4 days. Family at bedside. Pt reports chronic L hip pain s/p acetabular ORIF (currently pending L EDY w/ Dr. Veras in september) and intermittent chronic L knee pain. The past 2-3 months he states he knee has been more problematic for him with intermittent swelling and stiffness with mainly medial sided pain. In addition, also gets pain about proximal shin region. However, over the past few days, these symptoms have become more pronounced, with difficulty in localizing his pain. Does report the majority of his pain is about the knee (pointing to the proximal tibia around anteromedial region). Has been having worsening ability to ambulate over the past 2 days. Given these symptoms, he presents for evaluation. In addition, reports fevers and chills at home. Denies any recent trauma. Patient denies numbness/tingling/burning in the BL LEs. No other bone/joint complaints. Patient is a community ambulator at baseline without assistive devices. Patient has no issues w/ ADLs/IADLs.   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   Currently on coumadin for +LLE DVT treatment.   Denies any back pain, leg weakness, leg paresthesias, bowel or bladder incontinence.     PAST MEDICAL & SURGICAL HISTORY:  Osteomyelitis: Vertebral  Arthritis  DVT (deep venous thrombosis)  HTN (hypertension)  History of hip surgery  Presence of IVC filter    MEDICATIONS  (STANDING):  amLODIPine   Tablet 7.5 milliGRAM(s) Oral daily  atorvastatin 20 milliGRAM(s) Oral at bedtime  cholecalciferol 1000 Unit(s) Oral daily  losartan 100 milliGRAM(s) Oral daily  metoprolol tartrate 50 milliGRAM(s) Oral two times a day  multivitamin 1 Tablet(s) Oral daily  piperacillin/tazobactam IVPB.. 3.375 Gram(s) IV Intermittent every 8 hours  potassium chloride   Powder 20 milliEquivalent(s) Oral once  sodium chloride 0.9%. 1000 milliLiter(s) (75 mL/Hr) IV Continuous <Continuous>  tamsulosin 0.4 milliGRAM(s) Oral at bedtime    MEDICATIONS  (PRN):  acetaminophen   Tablet .. 650 milliGRAM(s) Oral every 4 hours PRN Mild Pain (1 - 3)  docusate sodium 100 milliGRAM(s) Oral three times a day PRN Constipation  ondansetron Injectable 4 milliGRAM(s) IV Push every 6 hours PRN Nausea  oxyCODONE    IR 5 milliGRAM(s) Oral every 4 hours PRN Moderate Pain (4 - 6)  senna 2 Tablet(s) Oral at bedtime PRN Constipation  traMADol 75 milliGRAM(s) Oral every 6 hours PRN Severe Pain (7 - 10)    Allergies    No Known Allergies    Intolerances        T(C): 36.8 (07-05-19 @ 01:30), Max: 39 (07-04-19 @ 20:00)  HR: 83 (07-05-19 @ 01:30) (77 - 113)  BP: 134/72 (07-05-19 @ 01:30) (129/64 - 170/87)  RR: 18 (07-05-19 @ 01:30) (18 - 24)  SpO2: 95% (07-05-19 @ 01:30) (95% - 100%)  Wt(kg): --    PE   LLE:  Previous incisions well healed.   Very subtle faint blanchable erythema over patella. Otherwise no redness.   No warmth or fluctuance.   Small knee effusion.   TTP over proximal aspect of anteromedial tibial.   Mild medial joint line TTP.   No hip TTP.   Neg log roll. Able to SLR without pain.   Knee ROM 5-120 without any painful ROM.   Compartments soft  No TTP to ankle/foot   Motor intact GS/TA/FHL/EHL  SILT L2-S1  DP/PT pulses 2+    RLE/BUE:   No bony TTP; Good ROM w/o pain; Exam Unremarkable    Secondary Survey: No TTP over bony prominences, SILT, palpable pulses, full/painless range of motion, compartments soft  5/5 motor strength throughout the bilateral LEs.   SILT throughout BL LEs.   No cervical, thoracic or lumbar TTP.     Imaging:  XR demonstrating L hip DJD, previous acetabular ORIF. No obvious fracture.   XR L Knee: DJD. No fracture. Small effusion. Tibial nail in place.   XR L tib/fib: tibial nail in place without any clear evidence of osteolysis.     83M with acute on chronic L knee pain    - Pt presents with fever, tachycardia of unknown origin/source. Orthopedics consulted to evaluate the left knee for suspicion of septic arthritis. Given patients subtle effusion, lack of warmth or erythema, well preserved painless ROM, the likelihood of septic arthritis is low based on the current exam. Low yield for aspiration given current exam findings.   - The majority of his pain is about the proximal tibia around the nail entry point, however no obvious fluctuance or drainage. No clear evidence of hardware failure, osteolysis or osteomyelitis on xray. Further dedicated imaging pending with CT w/w/o contrast may help further define any possible source from this region.   - Pt reports chronic L hip pain, unchanged from baseline. No redness, warmth or any painful ROM. No suspicion for infectious source about the L hip.   - Regarding other possible etiologies from an orthopedic standpoint, daisy given hx of vertebral OM/high ESR/fevers, workup for possible spinal epidural abscess/OM/discitis may be of benefit. Would consider MRI CTL spine with and without contrast. Would recommend continuing broad spectrum IV abx and blood cx. In addition, frequent NV checks are paramount with this dx in mind.   - Will follow up on above advanced imaging.   - No role for any acute surgical intervention from a general orthopedic standpoint regarding the L knee/leg. Will follow up on imaging pending as noted above. Should the diagnosis of etiology remain unclear, may benefit from indium scan to localize source.   - Recommend broad spectrum IV Abx, ID consult, BCx  - DW attending Dr. Hull, agrees with above.

## 2019-07-05 NOTE — PROGRESS NOTE ADULT - SUBJECTIVE AND OBJECTIVE BOX
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 The patient states that he has had left knee > hip pain over the last several days but has since progressively worsened preventing him from walking. At baseline the patient is a community ambulator with the assistance of a cane. Vitals in triage => /80, /min, RR 22/min, Tmax 102.2'F. Labs => ESR 90, Hgb/Hct 12.4/37.7, INR 3.08, K 3.4, Alb 3.0. CT Chest/ABD/Pelvis => Hepatic steatosis. Punctate non-obstructing kidney stones bilaterally. Bilateral cortical and parapelvic cysts. Mild prostatic enlargement. Inferior vena cava filter is present. Surgical hardware in the left acetabulum and ischium. Severe osteoarthritis in the left hip. In the ED the patient received Acetaminophen 975mg PO x 1, Morphine 4mg IVP x 1, Piperacillin/tazobactam 3.375g IVPB x 1, Vancomycin 1g IVPB x 1, and NS    SUBJECTIVE: Pt was examined at bedside this morning in the presence of his family members, he seemed to be in no apparent distress. Reports pain in his (L) Knee joint and (L) Hip, denies back pain or discomfort in spinal area. A.m imaging suggests a a recurrence of Discitis in an early stage; with Edema and enhancement present in L6-7 AND T12-01. Will f/u Blood Cx and get PT onboard; Ortho no longer needed. 	     REVIEW OF SYSTEMS:    CONSTITUTIONAL: + weakness, fevers and chills  EYES/ENT: No visual changes;  No vertigo or throat pain   NECK: No pain or stiffness  RESPIRATORY: No cough, wheezing, hemoptysis; No shortness of breath  CARDIOVASCULAR: No chest pain or palpitations  GASTROINTESTINAL: No abdominal or epigastric pain  GENITOURINARY: No dysuria, frequency or hematuria  NEUROLOGICAL: No numbness or weakness  SKIN: No itching, burning, rashes, or lesions   All other review of systems is negative unless indicated above    Vital Signs Last 24 Hrs  T(C): 36.9 (05 Jul 2019 17:02), Max: 39 (04 Jul 2019 20:00)  T(F): 98.4 (05 Jul 2019 17:02), Max: 102.2 (04 Jul 2019 20:00)  HR: 89 (05 Jul 2019 17:02) (77 - 113)  BP: 129/68 (05 Jul 2019 17:02) (120/68 - 170/87)  BP(mean): --  RR: 16 (05 Jul 2019 17:02) (16 - 24)  SpO2: 91% (05 Jul 2019 17:02) (91% - 100%)    I&O's Summary    05 Jul 2019 07:01  -  05 Jul 2019 18:38  --------------------------------------------------------  IN: 1000 mL / OUT: 0 mL / NET: 1000 mL        < from: MR Thoracic Spine w/wo IV Cont (07.05.19 @ 04:17) >  IMPRESSION:   1. Multilevel degenerative disc disease and spondylosis primarily at T6-7   through T11-12.  2. No significant cord impingement is seen.   3. Minimal anterolisthesis T2-T3 on a degenerative basis.   4.  Abnormal enhancement and edema seen involving the inferior endplate   of T6 and superior endplate of T7 with minimal enhancement of the T6-7   intervertebral discs. These findings are suspicious with osteomyelitis   and discitis.          PHYSICAL EXAM:    Constitutional: NAD, awake and alert  HEENT:  Normal Hearing,   Respiratory: Breath sounds are clear bilaterally, No wheezing, rales or rhonchi  Cardiovascular: S1 and S2, regular rate and rhythm, no Murmurs, gallops or rubs  Gastrointestinal: Bowel Sounds present, soft, nontender, nondistended, no guarding, no rebound  Extremities: No peripheral edema, however swelling of (L) Knee joint appreciated   Vascular: 2+ peripheral pulses  Neurological: A/O x 3, no focal deficits  Musculoskeletal: 5/5 strength and full ROM of (R)  and LE; (L) LE restricted by pain.  Skin: No rashes    MEDICATIONS:  MEDICATIONS  (STANDING):  amLODIPine   Tablet 7.5 milliGRAM(s) Oral daily  atorvastatin 20 milliGRAM(s) Oral at bedtime  cefTRIAXone Injectable. 2000 milliGRAM(s) IV Push every 24 hours  cholecalciferol 1000 Unit(s) Oral daily  losartan 100 milliGRAM(s) Oral daily  metoprolol tartrate 50 milliGRAM(s) Oral two times a day  multivitamin 1 Tablet(s) Oral daily  tamsulosin 0.4 milliGRAM(s) Oral at bedtime  vancomycin  IVPB 750 milliGRAM(s) IV Intermittent every 12 hours      LABS: All Labs Reviewed:                        10.9   8.14  )-----------( 182      ( 05 Jul 2019 05:12 )             33.1     07-05    142  |  107  |  14  ----------------------------<  114<H>  3.2<L>   |  26  |  1.04    Ca    8.9      05 Jul 2019 05:12  Mg     2.0     07-05    TPro  6.8  /  Alb  2.5<L>  /  TBili  0.8  /  DBili  x   /  AST  14<L>  /  ALT  19  /  AlkPhos  126<H>  07-05    PT/INR - ( 05 Jul 2019 06:01 )   PT: 37.3 sec;   INR: 3.24 ratio         PTT - ( 05 Jul 2019 06:01 )  PTT:46.5 sec      Blood Culture:     RADIOLOGY/EKG:    DVT PPX:    ADVANCED DIRECTIVE:    DISPOSITION:

## 2019-07-05 NOTE — CONSULT NOTE ADULT - SUBJECTIVE AND OBJECTIVE BOX
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 The patient states that he has had left knee > hip pain over the last several days but has since progressively worsened preventing him from walking. At baseline the patient is a community ambulator with the assistance of a cane. In ER he was febrile Tmax 102.2'F. In the ED the patient received Piperacillin/tazobactam 3.375g IVPB, Vancomycin 1g IVPB x 1.    PMH: as above  PSH: as above  Meds: per reconciliation sheet, noted below  MEDICATIONS  (STANDING):  amLODIPine   Tablet 7.5 milliGRAM(s) Oral daily  atorvastatin 20 milliGRAM(s) Oral at bedtime  cholecalciferol 1000 Unit(s) Oral daily  lactated ringers. 1000 milliLiter(s) (75 mL/Hr) IV Continuous <Continuous>  losartan 100 milliGRAM(s) Oral daily  metoprolol tartrate 50 milliGRAM(s) Oral two times a day  multivitamin 1 Tablet(s) Oral daily  piperacillin/tazobactam IVPB.. 3.375 Gram(s) IV Intermittent every 8 hours  tamsulosin 0.4 milliGRAM(s) Oral at bedtime    MEDICATIONS  (PRN):  acetaminophen   Tablet .. 650 milliGRAM(s) Oral every 4 hours PRN Mild Pain (1 - 3)  docusate sodium 100 milliGRAM(s) Oral three times a day PRN Constipation  ondansetron Injectable 4 milliGRAM(s) IV Push every 6 hours PRN Nausea  oxyCODONE    IR 5 milliGRAM(s) Oral every 4 hours PRN Moderate Pain (4 - 6)  senna 2 Tablet(s) Oral at bedtime PRN Constipation  traMADol 75 milliGRAM(s) Oral every 6 hours PRN Severe Pain (7 - 10)    Allergies    No Known Allergies    Intolerances      Social: no smoking, no alcohol, no illegal drugs; no recent travel, no exposure to TB  FAMILY HISTORY:  No pertinent family history in first degree relatives    no history of premature cardiovascular disease in first degree relatives    ROS: the patient denies HA, no seizures, no dizziness, no sore throat, no nasal congestion, no blurry vision, no CP, no palpitations, no SOB, no cough, no abdominal pain, no diarrhea, no N/V, no dysuria, no leg pain, no claudication, no rash, has left knee pain, no rectal pain or bleeding, no night sweats  All other systems reviewed and are negative    Vital Signs Last 24 Hrs  T(C): 37 (2019 04:29), Max: 39 (2019 20:00)  T(F): 98.6 (2019 04:29), Max: 102.2 (2019 20:00)  HR: 96 (2019 04:) (77 - 113)  BP: 120/68 (2019 04:29) (120/68 - 170/87)  BP(mean): --  RR: 17 (2019 04:29) (17 - 24)  SpO2: 96% (2019 04:29) (95% - 100%)  Daily Height in cm: 172.72 (2019 19:22)    Daily     PE:    Constitutional: frail looking  HEENT: NC/AT, EOMI, PERRLA, conjunctivae clear; ears and nose atraumatic; pharynx benign  Neck: supple; thyroid not palpable  Back: no tenderness  Respiratory: respiratory effort normal; clear to auscultation  Cardiovascular: S1S2 regular, no murmurs  Abdomen: soft, not tender, not distended, positive BS; no liver or spleen organomegaly  Genitourinary: no suprapubic tenderness  Lymphatic: no LN palpable  Musculoskeletal: no muscle tenderness, no joint swelling or tenderness  Has lumbar tenderness  Left knee mild edema  Extremities: 1+ pedal edema  Neurological/ Psychiatric: AxOx3, judgement and insight normal; moving all extremities  Skin: no rashes; no palpable lesions    Labs: all available labs reviewed                        10.9   8.14  )-----------( 182      ( 2019 05:12 )             33.1     07-05    142  |  107  |  14  ----------------------------<  114<H>  3.2<L>   |  26  |  1.04    Ca    8.9      2019 05:12  Mg     2.0     07-05    TPro  6.8  /  Alb  2.5<L>  /  TBili  0.8  /  DBili  x   /  AST  14<L>  /  ALT  19  /  AlkPhos  126<H>  07-     LIVER FUNCTIONS - ( 2019 05:12 )  Alb: 2.5 g/dL / Pro: 6.8 gm/dL / ALK PHOS: 126 U/L / ALT: 19 U/L / AST: 14 U/L / GGT: x           Urinalysis Basic - ( 2019 22:09 )    Color: Yellow / Appearance: Clear / S.010 / pH: x  Gluc: x / Ketone: Negative  / Bili: Negative / Urobili: Negative mg/dL   Blood: x / Protein: 30 mg/dL / Nitrite: Negative   Leuk Esterase: Negative / RBC: 0-2 /HPF / WBC 0-2   Sq Epi: x / Non Sq Epi: Occasional / Bacteria: Negative      Radiology: all available radiological tests reviewed    < from: MR Thoracic Spine w/wo IV Cont (19 @ 04:17) >  1. Multilevel degenerative disc disease and spondylosis primarily at T6-7   through T11-12.  2. No significant cord impingement is seen.   3. Minimal anterolisthesis T2-T3 on a degenerative basis.   4.  Abnormal enhancement and edema seen involving the inferior endplate   of T6 and superior endplate of T7 with minimal enhancement of the T6-7   intervertebral discs. These findings are suspicious with osteomyelitis   and discitis.    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 The patient states that he has had left knee > hip pain over the last several days but has since progressively worsened preventing him from walking. At baseline the patient is a community ambulator with the assistance of a cane. In ER he was febrile Tmax 102.2'F. In the ED the patient received Piperacillin/tazobactam 3.375g IVPB, Vancomycin 1g IVPB x 1.    PMH: as above  PSH: as above  Meds: per reconciliation sheet, noted below  MEDICATIONS  (STANDING):  amLODIPine   Tablet 7.5 milliGRAM(s) Oral daily  atorvastatin 20 milliGRAM(s) Oral at bedtime  cholecalciferol 1000 Unit(s) Oral daily  lactated ringers. 1000 milliLiter(s) (75 mL/Hr) IV Continuous <Continuous>  losartan 100 milliGRAM(s) Oral daily  metoprolol tartrate 50 milliGRAM(s) Oral two times a day  multivitamin 1 Tablet(s) Oral daily  piperacillin/tazobactam IVPB.. 3.375 Gram(s) IV Intermittent every 8 hours  tamsulosin 0.4 milliGRAM(s) Oral at bedtime    MEDICATIONS  (PRN):  acetaminophen   Tablet .. 650 milliGRAM(s) Oral every 4 hours PRN Mild Pain (1 - 3)  docusate sodium 100 milliGRAM(s) Oral three times a day PRN Constipation  ondansetron Injectable 4 milliGRAM(s) IV Push every 6 hours PRN Nausea  oxyCODONE    IR 5 milliGRAM(s) Oral every 4 hours PRN Moderate Pain (4 - 6)  senna 2 Tablet(s) Oral at bedtime PRN Constipation  traMADol 75 milliGRAM(s) Oral every 6 hours PRN Severe Pain (7 - 10)    Allergies    No Known Allergies    Intolerances      Social: no smoking, no alcohol, no illegal drugs; no recent travel, no exposure to TB  FAMILY HISTORY:  No pertinent family history in first degree relatives    no history of premature cardiovascular disease in first degree relatives    ROS: the patient denies HA, no seizures, no dizziness, no sore throat, no nasal congestion, no blurry vision, no CP, no palpitations, no SOB, no cough, no abdominal pain, no diarrhea, no N/V, no dysuria, no leg pain, no claudication, no rash, has left knee pain, no rectal pain or bleeding, no night sweats  All other systems reviewed and are negative    Vital Signs Last 24 Hrs  T(C): 37 (2019 04:29), Max: 39 (2019 20:00)  T(F): 98.6 (2019 04:29), Max: 102.2 (2019 20:00)  HR: 96 (2019 04:) (77 - 113)  BP: 120/68 (2019 04:29) (120/68 - 170/87)  BP(mean): --  RR: 17 (2019 04:29) (17 - 24)  SpO2: 96% (2019 04:29) (95% - 100%)  Daily Height in cm: 172.72 (2019 19:22)    Daily     PE:    Constitutional: frail looking  HEENT: NC/AT, EOMI, PERRLA, conjunctivae clear; ears and nose atraumatic; pharynx benign  Neck: supple; thyroid not palpable  Back: no tenderness  Respiratory: respiratory effort normal; clear to auscultation  Cardiovascular: S1S2 regular, no murmurs  Abdomen: soft, not tender, not distended, positive BS; no liver or spleen organomegaly  Genitourinary: no suprapubic tenderness  Lymphatic: no LN palpable  Musculoskeletal: no muscle tenderness, no joint swelling or tenderness  Has left hip tenderness  Left knee mild edema with effusion; no erythema  Extremities: 1+ pedal edema  Neurological/ Psychiatric: AxOx3, judgement and insight normal; moving all extremities  Skin: no rashes; no palpable lesions    Labs: all available labs reviewed                        10.   814  )-----------( 182      ( 2019 05:12 )             33.1     07-05    142  |  107  |  14  ----------------------------<  114<H>  3.2<L>   |  26  |  1.04    Ca    8.9      2019 05:12  Mg     2.0     07-05    TPro  6.8  /  Alb  2.5<L>  /  TBili  0.8  /  DBili  x   /  AST  14<L>  /  ALT  19  /  AlkPhos  126<H>  07-     LIVER FUNCTIONS - ( 2019 05:12 )  Alb: 2.5 g/dL / Pro: 6.8 gm/dL / ALK PHOS: 126 U/L / ALT: 19 U/L / AST: 14 U/L / GGT: x           Urinalysis Basic - ( 2019 22:09 )    Color: Yellow / Appearance: Clear / S.010 / pH: x  Gluc: x / Ketone: Negative  / Bili: Negative / Urobili: Negative mg/dL   Blood: x / Protein: 30 mg/dL / Nitrite: Negative   Leuk Esterase: Negative / RBC: 0-2 /HPF / WBC 0-2   Sq Epi: x / Non Sq Epi: Occasional / Bacteria: Negative      Radiology: all available radiological tests reviewed    < from: MR Thoracic Spine w/wo IV Cont (19 @ 04:17) >  1. Multilevel degenerative disc disease and spondylosis primarily at T6-7   through T11-12.  2. No significant cord impingement is seen.   3. Minimal anterolisthesis T2-T3 on a degenerative basis.   4.  Abnormal enhancement and edema seen involving the inferior endplate   of T6 and superior endplate of T7 with minimal enhancement of the T6-7   intervertebral discs. These findings are suspicious with osteomyelitis   and discitis.    < end of copied text >    < from: MR Lumbar Spine w/wo IV Cont (19 @ 04:17) >  1. Disc herniation at T12-L1. edema at the inferior endplate of T12 and   anterior endplate of L1 with erosion and enhancement suspicious for   osteomyelitis and discitis.  2. Multilevel degenerative disc disease and spondylosis with severe   central stenosis at L3-4 and L4-5 and moderate central stenosis at L2-3   on a degenerative basis    < end of copied text >    < from: MR Cervical Spine w/wo IV Cont (19 @ 04:13) >  1. Degenerative marked reversal of the cervical lordosis.   2. Multilevel degenerative disc disease and spondylosis with narrowing of   the BILATERAL C3-4 through C7-T1 neural foramina and marked degenerative   cord impingement at C3-4 through C6-7.   3. There is abnormal increased signal of the cervical cord particularly   from   C3-C6-C7 likely cord edema/gliosis due to chronic cord compression.   4. No acute bony fracture.    < end of copied text >      Advanced directives addressed: full resuscitation

## 2019-07-05 NOTE — CHART NOTE - NSCHARTNOTEFT_GEN_A_CORE
Patient seen and examined at bedside with Orthopaedic Attending Dr. Hull. Left Knee Effusion was noted on exam without surrounding erythema or drainage. Full A/PROM of the left knee.   Left knee arthrocentesis at bedside was recommended and consent was obtained from the patient. Patient was identified, site of procedure was marked and time-out performed with nurse. The affected knee was sterilely prepped with chlorhexidine x 2 and an  arthrocentesis was performed with the usual sterile technique. The knee was aspirated with a 60cc syringe and 18gauge needle through the lateral aspect of the left knee.  40 mL of yellow, synovial fluid was obtained.  Procedure was performed with no acute events/complications. Pt tolerated procedure very well.  Pt is stable.   -F/U Labs  -  Cell count, Crystals, Gram stain, culture and sensitivity was ordered for the synovial fluid of the affected knee  -  F/u in AM -> check cell count, etc.  - Case d/w Dr. Hull who agrees.

## 2019-07-05 NOTE — CONSULT NOTE ADULT - ATTENDING COMMENTS
I have seen the patient and agree with above. His range of motion is 0-110 with minimal pain. Effusion seen and will aspirate today and send to lab while waiting results.

## 2019-07-05 NOTE — PROGRESS NOTE ADULT - ASSESSMENT
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

## 2019-07-05 NOTE — CONSULT NOTE ADULT - SUBJECTIVE AND OBJECTIVE BOX
83y Male presents to [facility] with c/o acute on chronic L knee pain x 3-4 days. Family at bedside. Pt reports chronic L hip pain s/p acetabular ORIF (currently pending L EDY w/ Dr. Veras in september) and intermittent chronic L knee pain. The past 2-3 months he states he knee has been more problematic for him with intermittent swelling and stiffness with mainly medial sided pain. In addition, also gets pain about proximal shin region. However, over the past few days, these symptoms have become more pronounced, with difficulty in localizing his pain. Does report the majority of his pain is about the knee (pointing to the proximal tibia around anteromedial region). Has been having worsening ability to ambulate over the past 2 days. Given these symptoms, he presents for evaluation. In addition, reports fevers and chills at home. Denies any recent trauma. Patient denies numbness/tingling/burning in the BL LEs. No other bone/joint complaints. Patient is a community ambulator at baseline without assistive devices. Patient has no issues w/ ADLs/IADLs.   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   Currently on coumadin for +LLE DVT treatment.   Denies any back pain, leg weakness, leg paresthesias, bowel or bladder incontinence.     Vital Signs Last 24 Hrs  T(C): 37 (05 Jul 2019 04:29), Max: 39 (04 Jul 2019 20:00)  T(F): 98.6 (05 Jul 2019 04:29), Max: 102.2 (04 Jul 2019 20:00)  HR: 96 (05 Jul 2019 04:29) (77 - 113)  BP: 120/68 (05 Jul 2019 04:29) (120/68 - 170/87)  BP(mean): --  RR: 17 (05 Jul 2019 04:29) (17 - 24)  SpO2: 96% (05 Jul 2019 04:29) (95% - 100%)    PAST MEDICAL & SURGICAL HISTORY:  Osteomyelitis: Vertebral  Arthritis  DVT (deep venous thrombosis)  HTN (hypertension)  History of hip surgery  Presence of IVC filter    PHYSICAL EXAM:  General; Awake and alert, Oriented x 3  Hips/Pelvis:   Able to SLR bilaterally. Negative log roll, heel strike. No pain on passive Int/Ext hip rotation.  Spine exam:  Neck: supple, NT, Full Painless baseline AROM  Back: no skin changes or any tenderness to palpation.   Spine:                       Motor exam:          Right Upper extremity: Delt 5/5, Biceps 5/5, Triceps 5/5, Wrist Ext 5/5, Wrist Flexion 5/5,  Strength 5/5         SILT C5-S1, No Clonus, Negative Mejia, +RP, Compartments soft           Left Upper extremity: Delt 5/5, Biceps 5/5, Triceps 5/5, Wrist Ext 5/5, Wrist Flexion 5/5,  Strength 5/5         SILT C5-S1, No Clonus, Negative Mejia, +RP, Compartments soft           Right Lower Extremity: Hip Flexion 5/5, Hip Extension 5/5, Knee Flexion 5/5, Knee Extension 5/5, Ankle Dorsiflexion 5/5,          Ankle Plantar Flexion 5/5, Extensor hallucis Longus 5/5         SILT L2-S1, No pain with SLR, Negative Clonus, Negative Babinski                  Left Lower Extremity: Hip Flexion 5/5, Hip Extension 5/5, Knee Flexion 5/5, Knee Extension 5/5, Ankle Dorsiflexion 5/5,          Ankle Plantar Flexion 5/5, Extensor hallucis Longus 5/5         SILT L2-S1, No pain with SLR, Negative Clonus, Negative Babinski

## 2019-07-06 LAB
ANION GAP SERPL CALC-SCNC: 7 MMOL/L — SIGNIFICANT CHANGE UP (ref 5–17)
APTT BLD: 48 SEC — HIGH (ref 27.5–36.3)
BUN SERPL-MCNC: 19 MG/DL — SIGNIFICANT CHANGE UP (ref 7–23)
CALCIUM SERPL-MCNC: 8.9 MG/DL — SIGNIFICANT CHANGE UP (ref 8.5–10.1)
CHLORIDE SERPL-SCNC: 103 MMOL/L — SIGNIFICANT CHANGE UP (ref 96–108)
CO2 SERPL-SCNC: 28 MMOL/L — SIGNIFICANT CHANGE UP (ref 22–31)
CREAT SERPL-MCNC: 1.4 MG/DL — HIGH (ref 0.5–1.3)
CULTURE RESULTS: NO GROWTH — SIGNIFICANT CHANGE UP
GLUCOSE SERPL-MCNC: 119 MG/DL — HIGH (ref 70–99)
GRAM STN FLD: SIGNIFICANT CHANGE UP
HCT VFR BLD CALC: 34.7 % — LOW (ref 39–50)
HGB BLD-MCNC: 11.3 G/DL — LOW (ref 13–17)
INR BLD: 3.47 RATIO — HIGH (ref 0.88–1.16)
MCHC RBC-ENTMCNC: 29.2 PG — SIGNIFICANT CHANGE UP (ref 27–34)
MCHC RBC-ENTMCNC: 32.6 GM/DL — SIGNIFICANT CHANGE UP (ref 32–36)
MCV RBC AUTO: 89.7 FL — SIGNIFICANT CHANGE UP (ref 80–100)
PLATELET # BLD AUTO: 222 K/UL — SIGNIFICANT CHANGE UP (ref 150–400)
POTASSIUM SERPL-MCNC: 3.8 MMOL/L — SIGNIFICANT CHANGE UP (ref 3.5–5.3)
POTASSIUM SERPL-SCNC: 3.8 MMOL/L — SIGNIFICANT CHANGE UP (ref 3.5–5.3)
PROTHROM AB SERPL-ACNC: 40 SEC — HIGH (ref 10–12.9)
RBC # BLD: 3.87 M/UL — LOW (ref 4.2–5.8)
RBC # FLD: 13.8 % — SIGNIFICANT CHANGE UP (ref 10.3–14.5)
SODIUM SERPL-SCNC: 138 MMOL/L — SIGNIFICANT CHANGE UP (ref 135–145)
SPECIMEN SOURCE: SIGNIFICANT CHANGE UP
SPECIMEN SOURCE: SIGNIFICANT CHANGE UP
WBC # BLD: 8.13 K/UL — SIGNIFICANT CHANGE UP (ref 3.8–10.5)
WBC # FLD AUTO: 8.13 K/UL — SIGNIFICANT CHANGE UP (ref 3.8–10.5)

## 2019-07-06 RX ORDER — POLYETHYLENE GLYCOL 3350 17 G/17G
17 POWDER, FOR SOLUTION ORAL ONCE
Refills: 0 | Status: COMPLETED | OUTPATIENT
Start: 2019-07-06 | End: 2019-07-06

## 2019-07-06 RX ORDER — LIDOCAINE 4 G/100G
1 CREAM TOPICAL DAILY
Refills: 0 | Status: DISCONTINUED | OUTPATIENT
Start: 2019-07-06 | End: 2019-07-09

## 2019-07-06 RX ORDER — ACETAMINOPHEN 500 MG
650 TABLET ORAL EVERY 6 HOURS
Refills: 0 | Status: DISCONTINUED | OUTPATIENT
Start: 2019-07-06 | End: 2019-07-09

## 2019-07-06 RX ADMIN — TRAMADOL HYDROCHLORIDE 75 MILLIGRAM(S): 50 TABLET ORAL at 10:50

## 2019-07-06 RX ADMIN — TRAMADOL HYDROCHLORIDE 75 MILLIGRAM(S): 50 TABLET ORAL at 21:27

## 2019-07-06 RX ADMIN — ATORVASTATIN CALCIUM 20 MILLIGRAM(S): 80 TABLET, FILM COATED ORAL at 20:32

## 2019-07-06 RX ADMIN — LOSARTAN POTASSIUM 100 MILLIGRAM(S): 100 TABLET, FILM COATED ORAL at 05:45

## 2019-07-06 RX ADMIN — Medication 1000 UNIT(S): at 11:04

## 2019-07-06 RX ADMIN — AMLODIPINE BESYLATE 7.5 MILLIGRAM(S): 2.5 TABLET ORAL at 05:46

## 2019-07-06 RX ADMIN — Medication 50 MILLIGRAM(S): at 05:45

## 2019-07-06 RX ADMIN — CEFTRIAXONE 2000 MILLIGRAM(S): 500 INJECTION, POWDER, FOR SOLUTION INTRAMUSCULAR; INTRAVENOUS at 11:04

## 2019-07-06 RX ADMIN — POLYETHYLENE GLYCOL 3350 17 GRAM(S): 17 POWDER, FOR SOLUTION ORAL at 17:36

## 2019-07-06 RX ADMIN — Medication 50 MILLIGRAM(S): at 17:37

## 2019-07-06 RX ADMIN — Medication 250 MILLIGRAM(S): at 17:38

## 2019-07-06 RX ADMIN — Medication 650 MILLIGRAM(S): at 17:37

## 2019-07-06 RX ADMIN — TRAMADOL HYDROCHLORIDE 75 MILLIGRAM(S): 50 TABLET ORAL at 09:28

## 2019-07-06 RX ADMIN — Medication 250 MILLIGRAM(S): at 05:42

## 2019-07-06 RX ADMIN — TRAMADOL HYDROCHLORIDE 75 MILLIGRAM(S): 50 TABLET ORAL at 20:33

## 2019-07-06 RX ADMIN — TAMSULOSIN HYDROCHLORIDE 0.4 MILLIGRAM(S): 0.4 CAPSULE ORAL at 20:40

## 2019-07-06 RX ADMIN — Medication 1 TABLET(S): at 11:04

## 2019-07-06 NOTE — PROGRESS NOTE ADULT - SUBJECTIVE AND OBJECTIVE BOX
24 Hr Events:  Pt seen and examined. No acute events overnight. States pain has improved significantly, no longer exquisitely tender over proximal tibia. No acute complaints.     Vital Signs Last 24 Hrs  T(C): 36.9 (06 Jul 2019 05:35), Max: 36.9 (05 Jul 2019 17:02)  T(F): 98.4 (06 Jul 2019 05:35), Max: 98.4 (05 Jul 2019 17:02)  HR: 68 (06 Jul 2019 05:35) (68 - 89)  BP: 119/49 (06 Jul 2019 05:35) (119/49 - 129/74)  BP(mean): --  RR: 16 (06 Jul 2019 05:35) (16 - 16)  SpO2: 94% (06 Jul 2019 05:35) (91% - 94%)    PE   LLE:  Previous incisions well healed.   Very subtle faint blanchable erythema over patella. Otherwise no redness.   No warmth or fluctuance.   Small knee effusion.   TTP over proximal aspect of anteromedial tibial.   Mild medial joint line TTP.   No hip TTP.   Neg log roll. Able to SLR without pain.   Knee ROM 5-120 without any painful ROM.   Compartments soft  No TTP to ankle/foot   Motor intact GS/TA/FHL/EHL  SILT L2-S1  DP/PT pulses 2+

## 2019-07-06 NOTE — PROGRESS NOTE ADULT - ASSESSMENT
83M w/ hx vertebral osteomyelitis (~5yrs ago, tx with IV abx) presents with fever of unknown origin, rule out vertebral OM/epidural abscess/discitis.     -Pt presents with above stated complaints. No neck or back pain. No neurological deficits. No bowel or bladder incontinence. Despite the Low objective data to clinically support any spinal infection, vertebral OM should still be high on differential.   -MRI C/T/L Spine demonstrating abnormalities in T6-7, T12-L1 suspicious for osteomyelitis and discitis per radiology  -Prior MRI results from previous infection from [facility] obtained, which demonstrated C6-7, T9/10 osteomyelitis/discitis in 2015  -Continue IV abx   -BCx NGTD  -ID recommendations   -No surgical intervention at this time based on current neurological exam  -Will obtain records from [facility]   -DARRIAN [place].

## 2019-07-06 NOTE — PROGRESS NOTE ADULT - SUBJECTIVE AND OBJECTIVE BOX
Date of service: 19 @ 13:23    Lying in bed in NAD  Left knee feels less swollen s/p arthrocentesis  No back pain at rest    ROS: no fever or chills; denies dizziness, no HA, no SOB or cough, no abdominal pain, no diarrhea or constipation; no dysuria, has left legs pain, no rashes    MEDICATIONS  (STANDING):  amLODIPine   Tablet 7.5 milliGRAM(s) Oral daily  atorvastatin 20 milliGRAM(s) Oral at bedtime  cefTRIAXone Injectable. 2000 milliGRAM(s) IV Push every 24 hours  cholecalciferol 1000 Unit(s) Oral daily  losartan 100 milliGRAM(s) Oral daily  metoprolol tartrate 50 milliGRAM(s) Oral two times a day  multivitamin 1 Tablet(s) Oral daily  tamsulosin 0.4 milliGRAM(s) Oral at bedtime  vancomycin  IVPB 750 milliGRAM(s) IV Intermittent every 12 hours      Vital Signs Last 24 Hrs  T(C): 36.7 (2019 11:58), Max: 36.9 (2019 17:02)  T(F): 98.1 (2019 11:58), Max: 98.4 (2019 17:02)  HR: 78 (2019 11:58) (68 - 89)  BP: 112/60 (2019 11:58) (112/60 - 129/74)  BP(mean): --  RR: 16 (2019 11:58) (16 - 16)  SpO2: 94% (2019 11:58) (91% - 94%)    Physical Exam:      Constitutional: frail looking  HEENT: NC/AT, EOMI, PERRLA, conjunctivae clear  Neck: supple; thyroid not palpable  Back: no tenderness  Respiratory: respiratory effort normal; clear to auscultation  Cardiovascular: S1S2 regular, no murmurs  Abdomen: soft, not tender, not distended, positive BS  Genitourinary: no suprapubic tenderness  Lymphatic: no LN palpable  Musculoskeletal: no muscle tenderness, no joint swelling or tenderness  Has left hip tenderness  Left knee mild edema with effusion; no erythema  Extremities: 1+ pedal edema  Neurological/ Psychiatric: AxOx3, moving all extremities  Skin: no rashes; no palpable lesions    Labs: reviewed                        11.3   8.13  )-----------( 222      ( 2019 09:47 )             34.7     07-06    138  |  103  |  19  ----------------------------<  119<H>  3.8   |  28  |  1.40<H>    Ca    8.9      2019 09:47  Mg     2.0     07-05    TPro  6.8  /  Alb  2.5<L>  /  TBili  0.8  /  DBili  x   /  AST  14<L>  /  ALT  19  /  AlkPhos  126<H>                          10.9   8.14  )-----------( 182      ( 2019 05:12 )             33.1     -    142  |  107  |  14  ----------------------------<  114<H>  3.2<L>   |  26  |  1.04    Ca    8.9      2019 05:12  Mg     2.0     07-05    TPro  6.8  /  Alb  2.5<L>  /  TBili  0.8  /  DBili  x   /  AST  14<L>  /  ALT  19  /  AlkPhos  126<H>       LIVER FUNCTIONS - ( 2019 05:12 )  Alb: 2.5 g/dL / Pro: 6.8 gm/dL / ALK PHOS: 126 U/L / ALT: 19 U/L / AST: 14 U/L / GGT: x           Urinalysis Basic - ( 2019 22:09 )    Color: Yellow / Appearance: Clear / S.010 / pH: x  Gluc: x / Ketone: Negative  / Bili: Negative / Urobili: Negative mg/dL   Blood: x / Protein: 30 mg/dL / Nitrite: Negative   Leuk Esterase: Negative / RBC: 0-2 /HPF / WBC 0-2   Sq Epi: x / Non Sq Epi: Occasional / Bacteria: Negative    Cell Count, Body Fluid (19 @ 18:30)    Monocyte/Macrophage Count - Body Fluid: 20 %    Fluid Segmented Granulocytes: 57 %    Fluid Type: Synovial fluid    Body Fluid Appearance: Slightly Cloudy    BF Lymphocytes: 23 %    Tube Type: Lavender    Color - Body Fluid: Yellow    Total Nucleated Cell Count, Body Fluid: 3880: Includes WBC and other nucleated cells if present. /uL    Total RBC Count: 4200 /uL    Crystals, Synovial Fluid (19 @ 18:30)    Synovial Crystals Clarity: Slightly Turbid    Synovial Crystals Tube: Sterile Screw Top    Synovial Crystals Color: Yellow    Synovial Crystals Id: None Seen    Radiology: all available radiological tests reviewed    < from: MR Thoracic Spine w/wo IV Cont (19 @ 04:17) >  1. Multilevel degenerative disc disease and spondylosis primarily at T6-7   through T11-12.  2. No significant cord impingement is seen.   3. Minimal anterolisthesis T2-T3 on a degenerative basis.   4.  Abnormal enhancement and edema seen involving the inferior endplate   of T6 and superior endplate of T7 with minimal enhancement of the T6-7   intervertebral discs. These findings are suspicious with osteomyelitis   and discitis.    < end of copied text >    < from: MR Lumbar Spine w/wo IV Cont (19 @ 04:17) >  1. Disc herniation at T12-L1. edema at the inferior endplate of T12 and   anterior endplate of L1 with erosion and enhancement suspicious for   osteomyelitis and discitis.  2. Multilevel degenerative disc disease and spondylosis with severe   central stenosis at L3-4 and L4-5 and moderate central stenosis at L2-3   on a degenerative basis    < end of copied text >    < from: MR Cervical Spine w/wo IV Cont (19 @ 04:13) >  1. Degenerative marked reversal of the cervical lordosis.   2. Multilevel degenerative disc disease and spondylosis with narrowing of   the BILATERAL C3-4 through C7-T1 neural foramina and marked degenerative   cord impingement at C3-4 through C6-7.   3. There is abnormal increased signal of the cervical cord particularly   from   C3-C6-C7 likely cord edema/gliosis due to chronic cord compression.   4. No acute bony fracture.    < end of copied text >      Advanced directives addressed: full resuscitation

## 2019-07-06 NOTE — PHYSICAL THERAPY INITIAL EVALUATION ADULT - ACTIVE RANGE OF MOTION EXAMINATION, REHAB EVAL
except R shoulder to 90*, L elbow 10-90* flexion./bilateral upper extremity Active ROM was WFL (within functional limits)/bilateral  lower extremity Active ROM was WFL (within functional limits)

## 2019-07-06 NOTE — PROGRESS NOTE ADULT - SUBJECTIVE AND OBJECTIVE BOX
Pt with previous H/O Osteomyelitis  Now admitted for fever  No major increase in pain in the T or L spine  MRIs were obtained fo the entire spine due to the remote H/O infection    A and O X3  Moving all extremities strongly  No major areas of spinal tender ness    MRIs were reviewed of the C,T and L Spine  Severe cervical stenosis with DDD, myelomalacia but no obvious infections noted  T spine with mult level DDD and T6-7 disk edema, DDD vs diskitis, no epidural collections  Lumbar spine with DDD and severe L3-5 stenosis, questionable DDD vis diskitis at the T12-L1 level, no abscess    Plan  No acute surgical interventions at this point  ID consulted for potential antibiotics  Will Follow

## 2019-07-06 NOTE — PROGRESS NOTE ADULT - SUBJECTIVE AND OBJECTIVE BOX
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 The patient states that he has had left knee > hip pain over the last several days but has since progressively worsened preventing him from walking. At baseline the patient is a community ambulator with the assistance of a cane. Vitals in triage => /80, /min, RR 22/min, Tmax 102.2'F. Labs => ESR 90, Hgb/Hct 12.4/37.7, INR 3.08, K 3.4, Alb 3.0. CT Chest/ABD/Pelvis => Hepatic steatosis. Punctate non-obstructing kidney stones bilaterally. Bilateral cortical and parapelvic cysts. Mild prostatic enlargement. Inferior vena cava filter is present. Surgical hardware in the left acetabulum and ischium. Severe osteoarthritis in the left hip. In the ED the patient received Acetaminophen 975mg PO x 1, Morphine 4mg IVP x 1, Piperacillin/tazobactam 3.375g IVPB x 1, Vancomycin 1g IVPB x 1, and NS     SUBJECTIVE: Pt was examined at bedside this morning, 1 day s/p (L) knee aspiration. Pt reports resolution of (L) knee pain, but states the pain returns with usage. Placing the pt on a PRN Lidocaine patch. Will f/u Asp Cx and continue current Antibiotics.       REVIEW OF SYSTEMS:    CONSTITUTIONAL: + weakness, fevers and chills  EYES/ENT: No visual changes;  No vertigo or throat pain   NECK: No pain or stiffness  RESPIRATORY: No cough, wheezing, hemoptysis; No shortness of breath  CARDIOVASCULAR: No chest pain or palpitations  GASTROINTESTINAL: No abdominal or epigastric pain  GENITOURINARY: No dysuria, frequency or hematuria  NEUROLOGICAL: No numbness or weakness  SKIN: No itching, burning, rashes, or lesions   All other review of systems is negative unless indicated above    Vital Signs Last 24 Hrs  T(C): 36.7 (06 Jul 2019 11:58), Max: 36.9 (05 Jul 2019 17:02)  T(F): 98.1 (06 Jul 2019 11:58), Max: 98.4 (05 Jul 2019 17:02)  HR: 78 (06 Jul 2019 11:58) (68 - 89)  BP: 112/60 (06 Jul 2019 11:58) (112/60 - 129/74)  BP(mean): --  RR: 16 (06 Jul 2019 11:58) (16 - 16)  SpO2: 94% (06 Jul 2019 11:58) (91% - 94%)    I&O's Summary    05 Jul 2019 07:01  -  05 Jul 2019 18:38  --------------------------------------------------------  IN: 1000 mL / OUT: 0 mL / NET: 1000 mL        < from: MR Thoracic Spine w/wo IV Cont (07.05.19 @ 04:17) >  IMPRESSION:   1. Multilevel degenerative disc disease and spondylosis primarily at T6-7   through T11-12.  2. No significant cord impingement is seen.   3. Minimal anterolisthesis T2-T3 on a degenerative basis.   4.  Abnormal enhancement and edema seen involving the inferior endplate   of T6 and superior endplate of T7 with minimal enhancement of the T6-7   intervertebral discs. These findings are suspicious with osteomyelitis   and discitis.          PHYSICAL EXAM:    Constitutional: NAD, awake and alert  HEENT:  Normal Hearing,   Respiratory: Breath sounds are clear bilaterally, No wheezing, rales or rhonchi  Cardiovascular: S1 and S2, regular rate and rhythm, no Murmurs, gallops or rubs  Gastrointestinal: Bowel Sounds present, soft, nontender, nondistended, no guarding, no rebound  Extremities: No peripheral edema,   Vascular: 2+ peripheral pulses  Neurological: A/O x 3, no focal deficits  Musculoskeletal: 5/5 strength and full ROM of (R)  and LE; (L) LE improved from yesterday.  Skin: No rashes      MEDICATIONS  (STANDING):  amLODIPine   Tablet 7.5 milliGRAM(s) Oral daily  atorvastatin 20 milliGRAM(s) Oral at bedtime  cefTRIAXone Injectable. 2000 milliGRAM(s) IV Push every 24 hours  cholecalciferol 1000 Unit(s) Oral daily  losartan 100 milliGRAM(s) Oral daily  metoprolol tartrate 50 milliGRAM(s) Oral two times a day  multivitamin 1 Tablet(s) Oral daily  polyethylene glycol 3350 17 Gram(s) Oral once  tamsulosin 0.4 milliGRAM(s) Oral at bedtime  vancomycin  IVPB 750 milliGRAM(s) IV Intermittent every 12 hours    MEDICATIONS  (PRN):  acetaminophen   Tablet .. 650 milliGRAM(s) Oral every 4 hours PRN Mild Pain (1 - 3)  docusate sodium 100 milliGRAM(s) Oral three times a day PRN Constipation  lidocaine   Patch 1 Patch Transdermal daily PRN intermittent pain, treat with patch as needed  ondansetron Injectable 4 milliGRAM(s) IV Push every 6 hours PRN Nausea  oxyCODONE    IR 5 milliGRAM(s) Oral every 4 hours PRN Moderate Pain (4 - 6)  senna 2 Tablet(s) Oral at bedtime PRN Constipation  traMADol 75 milliGRAM(s) Oral every 6 hours PRN Severe Pain (7 - 10)      LABS: All Labs Reviewed:                                   11.3   8.13  )-----------( 222      ( 06 Jul 2019 09:47 )             34.7   07-06    138  |  103  |  19  ----------------------------<  119<H>  3.8   |  28  |  1.40<H>    Ca    8.9      06 Jul 2019 09:47  Mg     2.0     07-05    TPro  6.8  /  Alb  2.5<L>  /  TBili  0.8  /  DBili  x   /  AST  14<L>  /  ALT  19  /  AlkPhos  126<H>  07-05    CBC Full  -  ( 06 Jul 2019 09:47 )  WBC Count : 8.13 K/uL  RBC Count : 3.87 M/uL  Hemoglobin : 11.3 g/dL  Hematocrit : 34.7 %  Platelet Count - Automated : 222 K/uL  Mean Cell Volume : 89.7 fl  Mean Cell Hemoglobin : 29.2 pg  Mean Cell Hemoglobin Concentration : 32.6 gm/dL

## 2019-07-06 NOTE — PHYSICAL THERAPY INITIAL EVALUATION ADULT - MODALITIES TREATMENT COMMENTS
Patient left OOB in chair with CBIR. B LEs on stool with pillow. Patient instructed to call for assistance back to bed or the bathroom, understands same. Family at bedside. Patient refused ice packs for pain control. RN informed of session/status.

## 2019-07-06 NOTE — PHYSICAL THERAPY INITIAL EVALUATION ADULT - GENERAL OBSERVATIONS, REHAB EVAL
Patient received out of bed in chair, spouse and son in during session. Patient c/o pain in L knee at 7/10.  Just medicated by RN 1/2 hour ago.

## 2019-07-06 NOTE — PHYSICAL THERAPY INITIAL EVALUATION ADULT - PERTINENT HX OF CURRENT PROBLEM, REHAB EVAL
84 yo M admitted or further evaluation of subjective fevers (Tmax 102'F) associated w/ left knee pain, swelling, and localized erythema. The patient states that he has had left knee > hip pain over the last several days but has since progressively worsened preventing him from walking.  CT: Surgical hardware in the left acetabulum and ischium. Severe osteoarthritis in the left hip  MRI TS: DDD, ? OM, discitis T6,7. S/P L knee arthrocentesis  7/5.

## 2019-07-06 NOTE — PROGRESS NOTE ADULT - SUBJECTIVE AND OBJECTIVE BOX
24 Hr Events:   Pt seen and examined. Pain well controlled. No acute events overnight. Pain in left proximal tibia significantly improved after aspiration of left knee. No acute changes in neurologic status.     Vital Signs Last 24 Hrs  T(C): 36.9 (06 Jul 2019 05:35), Max: 36.9 (05 Jul 2019 17:02)  T(F): 98.4 (06 Jul 2019 05:35), Max: 98.4 (05 Jul 2019 17:02)  HR: 68 (06 Jul 2019 05:35) (68 - 89)  BP: 119/49 (06 Jul 2019 05:35) (119/49 - 129/74)  BP(mean): --  RR: 16 (06 Jul 2019 05:35) (16 - 16)  SpO2: 94% (06 Jul 2019 05:35) (91% - 94%)    PHYSICAL EXAM:  General; Awake and alert, Oriented x 3  Hips/Pelvis:   Able to SLR bilaterally. Negative log roll, heel strike. No pain on passive Int/Ext hip rotation.  Spine exam:  Neck: supple, NT, Full Painless baseline AROM  Back: no skin changes or any tenderness to palpation.   Spine:                       Motor exam:          Right Upper extremity: Delt 5/5, Biceps 5/5, Triceps 5/5, Wrist Ext 5/5, Wrist Flexion 5/5,  Strength 5/5         SILT C5-S1, No Clonus, Negative Mejia, +RP, Compartments soft           Left Upper extremity: Delt 5/5, Biceps 5/5, Triceps 5/5, Wrist Ext 5/5, Wrist Flexion 5/5,  Strength 5/5         SILT C5-S1, No Clonus, Negative Mejia, +RP, Compartments soft           Right Lower Extremity: Hip Flexion 5/5, Hip Extension 5/5, Knee Flexion 5/5, Knee Extension 5/5, Ankle Dorsiflexion 5/5,          Ankle Plantar Flexion 5/5, Extensor hallucis Longus 5/5         SILT L2-S1, No pain with SLR, Negative Clonus, Negative Babinski                  Left Lower Extremity: Hip Flexion 5/5, Hip Extension 5/5, Knee Flexion 5/5, Knee Extension 5/5, Ankle Dorsiflexion 5/5,          Ankle Plantar Flexion 5/5, Extensor hallucis Longus 5/5         SILT L2-S1, No pain with SLR, Negative Clonus, Negative Babinski

## 2019-07-06 NOTE — PROGRESS NOTE ADULT - ASSESSMENT
83M with acute on chronic L knee pain  - s/p L knee aspiration 7/5, GS neg, Crystals Neg, CC 3.8K, FU Asp Cx  - Pt presents with fever, tachycardia of unknown origin/source. Orthopedics consulted to evaluate the left knee for suspicion of septic arthritis. Given patients subtle effusion, lack of warmth or erythema, well preserved painless ROM, the likelihood of septic arthritis is low based on the current exam. Low yield for aspiration given current exam findings.   - The majority of his pain is about the proximal tibia around the nail entry point, however no obvious fluctuance or drainage. No clear evidence of hardware failure, osteolysis or osteomyelitis on xray. Further dedicated imaging pending with CT w/w/o contrast may help further define any possible source from this region.   - Pt reports chronic L hip pain, unchanged from baseline. No redness, warmth or any painful ROM. No suspicion for infectious source about the L hip.   - Regarding other possible etiologies from an orthopedic standpoint, daisy given hx of vertebral OM/high ESR/fevers, workup for possible spinal epidural abscess/OM/discitis may be of benefit. Would consider MRI CTL spine with and without contrast. Would recommend continuing broad spectrum IV abx and blood cx. In addition, frequent NV checks are paramount with this dx in mind.   - Will follow up on above advanced imaging.   - No role for any acute surgical intervention from a general orthopedic standpoint regarding the L knee/leg. Will follow up on imaging pending as noted above. Should the diagnosis of etiology remain unclear, may benefit from indium scan to localize source.   - Recommend broad spectrum IV Abx, ID consult, BCx  - DW attending Dr. Hull, agrees with above.

## 2019-07-06 NOTE — PROGRESS NOTE ADULT - ASSESSMENT
84 y/o M PMHx significant for osteoarthritis, left lower extremity DVT on VKA therapy, IVC filter placement complicated by migration to right ventricle requiring extraction via open cardiotomy, hypertension, BPH, and vertebral osteomyelitis, being treated for Discitis

## 2019-07-06 NOTE — PROGRESS NOTE ADULT - ASSESSMENT
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 The patient states that he has had left knee > hip pain over the last several days but has since progressively worsened preventing him from walking. At baseline the patient is a community ambulator with the assistance of a cane. In ER he was febrile Tmax 102.2'F. In the ED the patient received Piperacillin/tazobactam 3.375g IVPB, Vancomycin 1g IVPB x 1.    1. Febrile syndrome. Possible sepsis. T6-7 probable discitis/OM. DDD. Left hip pain ?underlying OA ?infection. Left knee effusion s/p arthrocentesis.  -doubt left knee septic arthritis  -f/u BC x 2  -ortho evaluation appreciated: conservative management at this time  -f/u fluid culture  -on vancomycin 750 mg IV q12h and ceftriaxone 2 gm IV qd # 2  -tolerating abx well so far; no side effects noted  -obtain vancomycin trough level   -continue abx coverage  -monitor temps  -f/u CBC  -supportive care  2. Other issues:   -care per medicine

## 2019-07-07 DIAGNOSIS — Z29.9 ENCOUNTER FOR PROPHYLACTIC MEASURES, UNSPECIFIED: ICD-10-CM

## 2019-07-07 LAB
ANION GAP SERPL CALC-SCNC: 9 MMOL/L — SIGNIFICANT CHANGE UP (ref 5–17)
APTT BLD: 44.8 SEC — HIGH (ref 27.5–36.3)
BUN SERPL-MCNC: 22 MG/DL — SIGNIFICANT CHANGE UP (ref 7–23)
CALCIUM SERPL-MCNC: 8.8 MG/DL — SIGNIFICANT CHANGE UP (ref 8.5–10.1)
CHLORIDE SERPL-SCNC: 104 MMOL/L — SIGNIFICANT CHANGE UP (ref 96–108)
CO2 SERPL-SCNC: 28 MMOL/L — SIGNIFICANT CHANGE UP (ref 22–31)
CREAT SERPL-MCNC: 1.24 MG/DL — SIGNIFICANT CHANGE UP (ref 0.5–1.3)
CRP SERPL-MCNC: 10.97 MG/DL — HIGH (ref 0–0.4)
ERYTHROCYTE [SEDIMENTATION RATE] IN BLOOD: 101 MM/HR — HIGH (ref 0–20)
GLUCOSE SERPL-MCNC: 127 MG/DL — HIGH (ref 70–99)
HCT VFR BLD CALC: 34.3 % — LOW (ref 39–50)
HGB BLD-MCNC: 11.2 G/DL — LOW (ref 13–17)
INR BLD: 3.02 RATIO — HIGH (ref 0.88–1.16)
MCHC RBC-ENTMCNC: 29.4 PG — SIGNIFICANT CHANGE UP (ref 27–34)
MCHC RBC-ENTMCNC: 32.7 GM/DL — SIGNIFICANT CHANGE UP (ref 32–36)
MCV RBC AUTO: 90 FL — SIGNIFICANT CHANGE UP (ref 80–100)
PLATELET # BLD AUTO: 198 K/UL — SIGNIFICANT CHANGE UP (ref 150–400)
POTASSIUM SERPL-MCNC: 3.8 MMOL/L — SIGNIFICANT CHANGE UP (ref 3.5–5.3)
POTASSIUM SERPL-SCNC: 3.8 MMOL/L — SIGNIFICANT CHANGE UP (ref 3.5–5.3)
PROTHROM AB SERPL-ACNC: 34.7 SEC — HIGH (ref 10–12.9)
RBC # BLD: 3.81 M/UL — LOW (ref 4.2–5.8)
RBC # FLD: 13.6 % — SIGNIFICANT CHANGE UP (ref 10.3–14.5)
SODIUM SERPL-SCNC: 141 MMOL/L — SIGNIFICANT CHANGE UP (ref 135–145)
VANCOMYCIN TROUGH SERPL-MCNC: 12.8 UG/ML — SIGNIFICANT CHANGE UP (ref 10–20)
WBC # BLD: 8.13 K/UL — SIGNIFICANT CHANGE UP (ref 3.8–10.5)
WBC # FLD AUTO: 8.13 K/UL — SIGNIFICANT CHANGE UP (ref 3.8–10.5)

## 2019-07-07 RX ORDER — HEPARIN SODIUM 5000 [USP'U]/ML
5000 INJECTION INTRAVENOUS; SUBCUTANEOUS EVERY 8 HOURS
Refills: 0 | Status: DISCONTINUED | OUTPATIENT
Start: 2019-07-07 | End: 2019-07-07

## 2019-07-07 RX ADMIN — Medication 650 MILLIGRAM(S): at 21:05

## 2019-07-07 RX ADMIN — Medication 50 MILLIGRAM(S): at 18:04

## 2019-07-07 RX ADMIN — Medication 50 MILLIGRAM(S): at 05:35

## 2019-07-07 RX ADMIN — TAMSULOSIN HYDROCHLORIDE 0.4 MILLIGRAM(S): 0.4 CAPSULE ORAL at 21:06

## 2019-07-07 RX ADMIN — Medication 1000 UNIT(S): at 12:22

## 2019-07-07 RX ADMIN — AMLODIPINE BESYLATE 7.5 MILLIGRAM(S): 2.5 TABLET ORAL at 05:35

## 2019-07-07 RX ADMIN — TRAMADOL HYDROCHLORIDE 75 MILLIGRAM(S): 50 TABLET ORAL at 09:53

## 2019-07-07 RX ADMIN — CEFTRIAXONE 2000 MILLIGRAM(S): 500 INJECTION, POWDER, FOR SOLUTION INTRAMUSCULAR; INTRAVENOUS at 12:22

## 2019-07-07 RX ADMIN — LOSARTAN POTASSIUM 100 MILLIGRAM(S): 100 TABLET, FILM COATED ORAL at 05:35

## 2019-07-07 RX ADMIN — Medication 1 TABLET(S): at 12:22

## 2019-07-07 RX ADMIN — TRAMADOL HYDROCHLORIDE 75 MILLIGRAM(S): 50 TABLET ORAL at 21:05

## 2019-07-07 RX ADMIN — Medication 250 MILLIGRAM(S): at 18:04

## 2019-07-07 RX ADMIN — TRAMADOL HYDROCHLORIDE 75 MILLIGRAM(S): 50 TABLET ORAL at 21:50

## 2019-07-07 RX ADMIN — Medication 650 MILLIGRAM(S): at 20:43

## 2019-07-07 RX ADMIN — ATORVASTATIN CALCIUM 20 MILLIGRAM(S): 80 TABLET, FILM COATED ORAL at 21:06

## 2019-07-07 RX ADMIN — Medication 250 MILLIGRAM(S): at 05:35

## 2019-07-07 NOTE — PROGRESS NOTE ADULT - ASSESSMENT
83M with acute on chronic L knee pain, rule out septic arthritis     - s/p L knee aspiration 7/5, GS neg, Crystals Neg, CC 3.8K, FU Asp Cx  - No role for any acute surgical intervention from a general orthopedic standpoint regarding the L knee/leg should the cultures remain positive.   - Recommend broad spectrum IV Abx per ID  - FU BCx  - DW attending Dr. Hull, agrees with above.

## 2019-07-07 NOTE — PROGRESS NOTE ADULT - PROBLEM SELECTOR PLAN 1
Imaging confirms edema/enhancement in C6-7 and T12-01; suggestive of early Discitis    -Vancomycin 750 mg IV q12h and ceftriaxone 2 gm IV qd # 3  - ID consult appreciated, continue abx

## 2019-07-07 NOTE — PROGRESS NOTE ADULT - ASSESSMENT
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 The patient states that he has had left knee > hip pain over the last several days but has since progressively worsened preventing him from walking. At baseline the patient is a community ambulator with the assistance of a cane. In ER he was febrile Tmax 102.2'F. In the ED the patient received Piperacillin/tazobactam 3.375g IVPB, Vancomycin 1g IVPB x 1.    1. Febrile syndrome. Possible sepsis. T6-7 probable discitis/OM. DDD. Left hip pain ?underlying OA ?infection. Left knee effusion s/p arthrocentesis.  -doubt left knee septic arthritis  -f/u BC x 2  -ortho evaluation appreciated: conservative management at this time  -f/u fluid culture  -on vancomycin 750 mg IV q12h and ceftriaxone 2 gm IV qd # 3  -tolerating abx well so far; no side effects noted  -vancomycin trough level is therapeutic  -continue abx coverage  -monitor temps  -f/u CBC  -supportive care  2. Other issues:   -care per medicine

## 2019-07-07 NOTE — PROGRESS NOTE ADULT - SUBJECTIVE AND OBJECTIVE BOX
24 Hr Events:   Pt seen and examined. Pain well controlled. No acute events overnight. Pain in left proximal tibia significantly improved after aspiration of left knee. No acute changes in neurologic status.     Vital Signs Last 24 Hrs  T(C): 37 (07 Jul 2019 04:59), Max: 38.6 (06 Jul 2019 17:30)  T(F): 98.6 (07 Jul 2019 04:59), Max: 101.5 (06 Jul 2019 17:30)  HR: 73 (07 Jul 2019 04:59) (73 - 94)  BP: 130/61 (07 Jul 2019 04:59) (112/60 - 138/67)  BP(mean): --  RR: 17 (07 Jul 2019 04:59) (16 - 18)  SpO2: 92% (07 Jul 2019 04:59) (91% - 95%)    PHYSICAL EXAM:  General; Awake and alert, Oriented x 3  Hips/Pelvis:   Able to SLR bilaterally. Negative log roll, heel strike. No pain on passive Int/Ext hip rotation.  Spine exam:  Neck: supple, NT, Full Painless baseline AROM  Back: no skin changes or any tenderness to palpation.   Spine:                       Motor exam:          Right Upper extremity: Delt 5/5, Biceps 5/5, Triceps 5/5, Wrist Ext 5/5, Wrist Flexion 5/5,  Strength 5/5         SILT C5-S1, No Clonus, Negative Mejia, +RP, Compartments soft           Left Upper extremity: Delt 5/5, Biceps 5/5, Triceps 5/5, Wrist Ext 5/5, Wrist Flexion 5/5,  Strength 5/5         SILT C5-S1, No Clonus, Negative Mejia, +RP, Compartments soft           Right Lower Extremity: Hip Flexion 5/5, Hip Extension 5/5, Knee Flexion 5/5, Knee Extension 5/5, Ankle Dorsiflexion 5/5,          Ankle Plantar Flexion 5/5, Extensor hallucis Longus 5/5         SILT L2-S1, No pain with SLR, Negative Clonus, Negative Babinski                  Left Lower Extremity: Hip Flexion 5/5, Hip Extension 5/5, Knee Flexion 5/5, Knee Extension 5/5, Ankle Dorsiflexion 5/5,          Ankle Plantar Flexion 5/5, Extensor hallucis Longus 5/5         SILT L2-S1, No pain with SLR, Negative Clonus, Negative Babinski

## 2019-07-07 NOTE — PROGRESS NOTE ADULT - SUBJECTIVE AND OBJECTIVE BOX
Date of service: 19 @ 11:55    Lying in bed in NAD  Weak looking  Left leg pain is improved  Febrile to 101.5F    ROS: denies dizziness, no HA, no SOB or cough, no abdominal pain, no diarrhea or constipation; no dysuria, no rashes    MEDICATIONS  (STANDING):  amLODIPine   Tablet 7.5 milliGRAM(s) Oral daily  atorvastatin 20 milliGRAM(s) Oral at bedtime  cefTRIAXone Injectable. 2000 milliGRAM(s) IV Push every 24 hours  cholecalciferol 1000 Unit(s) Oral daily  losartan 100 milliGRAM(s) Oral daily  metoprolol tartrate 50 milliGRAM(s) Oral two times a day  multivitamin 1 Tablet(s) Oral daily  tamsulosin 0.4 milliGRAM(s) Oral at bedtime  vancomycin  IVPB 750 milliGRAM(s) IV Intermittent every 12 hours      Vital Signs Last 24 Hrs  T(C): 37 (2019 04:59), Max: 38.6 (2019 17:30)  T(F): 98.6 (2019 04:59), Max: 101.5 (2019 17:30)  HR: 73 (2019 04:59) (73 - 94)  BP: 130/61 (2019 04:59) (112/60 - 138/67)  BP(mean): --  RR: 17 (2019 04:59) (16 - 18)  SpO2: 92% (2019 04:59) (91% - 95%)    Physical Exam:      Constitutional: frail looking  HEENT: NC/AT, EOMI, PERRLA, conjunctivae clear  Neck: supple; thyroid not palpable  Back: no tenderness  Respiratory: respiratory effort normal; decreased BS at bases  Cardiovascular: S1S2 regular, no murmurs  Abdomen: soft, not tender, not distended, positive BS  Genitourinary: no suprapubic tenderness  Lymphatic: no LN palpable  Musculoskeletal: no muscle tenderness, no joint swelling or tenderness  Has left hip tenderness  Left knee mild edema with effusion; no erythema  Extremities: 1+ pedal edema  Neurological/ Psychiatric: AxOx3, moving all extremities  Skin: no rashes; no palpable lesions    Labs: reviewed                        11.2   8.13  )-----------( 198      ( 2019 11:06 )             34.3     07    141  |  104  |  22  ----------------------------<  127<H>  3.8   |  28  |  1.24    Ca    8.8      2019 05:04    Vancomycin Level, Trough: 12.8 ug/mL ( @ 05:04)                        10.9   8.14  )-----------( 182      ( 2019 05:12 )             33.1         142  |  107  |  14  ----------------------------<  114<H>  3.2<L>   |  26  |  1.04    Ca    8.9      2019 05:12  Mg     2.0         TPro  6.8  /  Alb  2.5<L>  /  TBili  0.8  /  DBili  x   /  AST  14<L>  /  ALT  19  /  AlkPhos  126<H>       LIVER FUNCTIONS - ( 2019 05:12 )  Alb: 2.5 g/dL / Pro: 6.8 gm/dL / ALK PHOS: 126 U/L / ALT: 19 U/L / AST: 14 U/L / GGT: x           Urinalysis Basic - ( 2019 22:09 )    Color: Yellow / Appearance: Clear / S.010 / pH: x  Gluc: x / Ketone: Negative  / Bili: Negative / Urobili: Negative mg/dL   Blood: x / Protein: 30 mg/dL / Nitrite: Negative   Leuk Esterase: Negative / RBC: 0-2 /HPF / WBC 0-2   Sq Epi: x / Non Sq Epi: Occasional / Bacteria: Negative    Cell Count, Body Fluid (19 @ 18:30)    Monocyte/Macrophage Count - Body Fluid: 20 %    Fluid Segmented Granulocytes: 57 %    Fluid Type: Synovial fluid    Body Fluid Appearance: Slightly Cloudy    BF Lymphocytes: 23 %    Tube Type: Lavender    Color - Body Fluid: Yellow    Total Nucleated Cell Count, Body Fluid: 3880: Includes WBC and other nucleated cells if present. /uL    Total RBC Count: 4200 /uL    Crystals, Synovial Fluid (19 @ 18:30)    Synovial Crystals Clarity: Slightly Turbid    Synovial Crystals Tube: Sterile Screw Top    Synovial Crystals Color: Yellow    Synovial Crystals Id: None Seen    Culture - Blood (19 @ 20:11)    Specimen Source: .Blood Blood-Peripheral    Culture Results:   No growth to date.        Radiology: all available radiological tests reviewed    < from: MR Thoracic Spine w/wo IV Cont (19 @ 04:17) >  1. Multilevel degenerative disc disease and spondylosis primarily at T6-7   through T11-12.  2. No significant cord impingement is seen.   3. Minimal anterolisthesis T2-T3 on a degenerative basis.   4.  Abnormal enhancement and edema seen involving the inferior endplate   of T6 and superior endplate of T7 with minimal enhancement of the T6-7   intervertebral discs. These findings are suspicious with osteomyelitis   and discitis.    < end of copied text >    < from: MR Lumbar Spine w/wo IV Cont (19 @ 04:17) >  1. Disc herniation at T12-L1. edema at the inferior endplate of T12 and   anterior endplate of L1 with erosion and enhancement suspicious for   osteomyelitis and discitis.  2. Multilevel degenerative disc disease and spondylosis with severe   central stenosis at L3-4 and L4-5 and moderate central stenosis at L2-3   on a degenerative basis    < end of copied text >    < from: MR Cervical Spine w/wo IV Cont (19 @ 04:13) >  1. Degenerative marked reversal of the cervical lordosis.   2. Multilevel degenerative disc disease and spondylosis with narrowing of   the BILATERAL C3-4 through C7-T1 neural foramina and marked degenerative   cord impingement at C3-4 through C6-7.   3. There is abnormal increased signal of the cervical cord particularly   from   C3-C6-C7 likely cord edema/gliosis due to chronic cord compression.   4. No acute bony fracture.    < end of copied text >      Advanced directives addressed: full resuscitation

## 2019-07-07 NOTE — PROGRESS NOTE ADULT - SUBJECTIVE AND OBJECTIVE BOX
24 Hr Events:  Pt seen and examined. No acute events overnight. States pain has improved significantly, no longer exquisitely tender over proximal tibia. No acute complaints.     Vital Signs Last 24 Hrs  T(C): 37 (07 Jul 2019 04:59), Max: 38.6 (06 Jul 2019 17:30)  T(F): 98.6 (07 Jul 2019 04:59), Max: 101.5 (06 Jul 2019 17:30)  HR: 73 (07 Jul 2019 04:59) (73 - 94)  BP: 130/61 (07 Jul 2019 04:59) (112/60 - 138/67)  BP(mean): --  RR: 17 (07 Jul 2019 04:59) (16 - 18)  SpO2: 92% (07 Jul 2019 04:59) (91% - 95%)    PE   LLE:  Previous incisions well healed.   Very subtle faint blanchable erythema over patella. Otherwise no redness.   No warmth or fluctuance.   Small knee effusion.   TTP over proximal aspect of anteromedial tibial.   Mild medial joint line TTP.   No hip TTP.   Neg log roll. Able to SLR without pain.   Knee ROM 5-120 without any painful ROM.   Compartments soft  No TTP to ankle/foot   Motor intact GS/TA/FHL/EHL  SILT L2-S1  DP/PT pulses 2+

## 2019-07-07 NOTE — PROGRESS NOTE ADULT - ASSESSMENT
83M w/ hx vertebral osteomyelitis (~5yrs ago, tx with IV abx) presents with fever of unknown origin, acute T6-7/T12-L1 Verte OM/discitis     -No neck or back pain. No neurological deficits. No bowel or bladder incontinence  -MRI C/T/L Spine demonstrating abnormalities in T6-7, T12-L1 suspicious for osteomyelitis and discitis per radiology  -Prior MRI results from previous infection from [facility] obtained, which demonstrated C6-7, T9/10 osteomyelitis/discitis in 2015  -Continue IV abx   -BCx NGTD  -ID recommendations   -No surgical intervention at this time based on current neurological exam  -DW Dr. [place].

## 2019-07-08 LAB
ANION GAP SERPL CALC-SCNC: 6 MMOL/L — SIGNIFICANT CHANGE UP (ref 5–17)
BUN SERPL-MCNC: 16 MG/DL — SIGNIFICANT CHANGE UP (ref 7–23)
CALCIUM SERPL-MCNC: 8.6 MG/DL — SIGNIFICANT CHANGE UP (ref 8.5–10.1)
CHLORIDE SERPL-SCNC: 100 MMOL/L — SIGNIFICANT CHANGE UP (ref 96–108)
CO2 SERPL-SCNC: 30 MMOL/L — SIGNIFICANT CHANGE UP (ref 22–31)
CREAT SERPL-MCNC: 1.12 MG/DL — SIGNIFICANT CHANGE UP (ref 0.5–1.3)
GLUCOSE SERPL-MCNC: 119 MG/DL — HIGH (ref 70–99)
HCT VFR BLD CALC: 31.8 % — LOW (ref 39–50)
HGB BLD-MCNC: 10.3 G/DL — LOW (ref 13–17)
INR BLD: 2.1 RATIO — HIGH (ref 0.88–1.16)
MCHC RBC-ENTMCNC: 28.9 PG — SIGNIFICANT CHANGE UP (ref 27–34)
MCHC RBC-ENTMCNC: 32.4 GM/DL — SIGNIFICANT CHANGE UP (ref 32–36)
MCV RBC AUTO: 89.1 FL — SIGNIFICANT CHANGE UP (ref 80–100)
PLATELET # BLD AUTO: 183 K/UL — SIGNIFICANT CHANGE UP (ref 150–400)
POTASSIUM SERPL-MCNC: 3.9 MMOL/L — SIGNIFICANT CHANGE UP (ref 3.5–5.3)
POTASSIUM SERPL-SCNC: 3.9 MMOL/L — SIGNIFICANT CHANGE UP (ref 3.5–5.3)
PROTHROM AB SERPL-ACNC: 23.8 SEC — HIGH (ref 10–12.9)
RBC # BLD: 3.57 M/UL — LOW (ref 4.2–5.8)
RBC # FLD: 13.6 % — SIGNIFICANT CHANGE UP (ref 10.3–14.5)
SODIUM SERPL-SCNC: 136 MMOL/L — SIGNIFICANT CHANGE UP (ref 135–145)
WBC # BLD: 7.45 K/UL — SIGNIFICANT CHANGE UP (ref 3.8–10.5)
WBC # FLD AUTO: 7.45 K/UL — SIGNIFICANT CHANGE UP (ref 3.8–10.5)

## 2019-07-08 RX ADMIN — Medication 250 MILLIGRAM(S): at 17:43

## 2019-07-08 RX ADMIN — Medication 1000 UNIT(S): at 11:03

## 2019-07-08 RX ADMIN — Medication 50 MILLIGRAM(S): at 17:43

## 2019-07-08 RX ADMIN — TRAMADOL HYDROCHLORIDE 75 MILLIGRAM(S): 50 TABLET ORAL at 16:49

## 2019-07-08 RX ADMIN — AMLODIPINE BESYLATE 7.5 MILLIGRAM(S): 2.5 TABLET ORAL at 05:16

## 2019-07-08 RX ADMIN — Medication 1 TABLET(S): at 11:03

## 2019-07-08 RX ADMIN — CEFTRIAXONE 2000 MILLIGRAM(S): 500 INJECTION, POWDER, FOR SOLUTION INTRAMUSCULAR; INTRAVENOUS at 11:02

## 2019-07-08 RX ADMIN — Medication 50 MILLIGRAM(S): at 05:16

## 2019-07-08 RX ADMIN — TAMSULOSIN HYDROCHLORIDE 0.4 MILLIGRAM(S): 0.4 CAPSULE ORAL at 21:19

## 2019-07-08 RX ADMIN — TRAMADOL HYDROCHLORIDE 75 MILLIGRAM(S): 50 TABLET ORAL at 10:14

## 2019-07-08 RX ADMIN — Medication 250 MILLIGRAM(S): at 05:16

## 2019-07-08 RX ADMIN — ATORVASTATIN CALCIUM 20 MILLIGRAM(S): 80 TABLET, FILM COATED ORAL at 21:19

## 2019-07-08 RX ADMIN — LOSARTAN POTASSIUM 100 MILLIGRAM(S): 100 TABLET, FILM COATED ORAL at 05:16

## 2019-07-08 NOTE — DIETITIAN INITIAL EVALUATION ADULT. - PERTINENT MEDS FT
MEDICATIONS  (STANDING):  amLODIPine   Tablet 7.5 milliGRAM(s) Oral daily  atorvastatin 20 milliGRAM(s) Oral at bedtime  cefTRIAXone Injectable. 2000 milliGRAM(s) IV Push every 24 hours  cholecalciferol 1000 Unit(s) Oral daily  losartan 100 milliGRAM(s) Oral daily  metoprolol tartrate 50 milliGRAM(s) Oral two times a day  multivitamin 1 Tablet(s) Oral daily  tamsulosin 0.4 milliGRAM(s) Oral at bedtime  vancomycin  IVPB 750 milliGRAM(s) IV Intermittent every 12 hours    MEDICATIONS  (PRN):  acetaminophen   Tablet .. 650 milliGRAM(s) Oral every 6 hours PRN Temp greater or equal to 38C (100.4F), Mild Pain (1 - 3)  docusate sodium 100 milliGRAM(s) Oral three times a day PRN Constipation  lidocaine   Patch 1 Patch Transdermal daily PRN intermittent pain, treat with patch as needed  ondansetron Injectable 4 milliGRAM(s) IV Push every 6 hours PRN Nausea  oxyCODONE    IR 5 milliGRAM(s) Oral every 4 hours PRN Moderate Pain (4 - 6)  senna 2 Tablet(s) Oral at bedtime PRN Constipation  traMADol 75 milliGRAM(s) Oral every 6 hours PRN Severe Pain (7 - 10)

## 2019-07-08 NOTE — PROGRESS NOTE ADULT - SUBJECTIVE AND OBJECTIVE BOX
(534)   Remains on IV antibiotics for abnormalities in T6-7, T12-L1 suspicious for osteomyelitis and discitis.   Pertinent history of C6-7, T9/10 osteomyelitis/discitis in 2015-   Seen by general ortho resident this am, cleared hip / left knee  no acute surgical intervention from spine stand point at this time.

## 2019-07-08 NOTE — PROGRESS NOTE ADULT - SUBJECTIVE AND OBJECTIVE BOX
Date of service: 07-08-19 @ 11:39    Pt seen and examined  Left leg pain is improved  Low grade temp o/n   Has no current back pain    ROS: denies dizziness, no HA, no SOB or cough, no abdominal pain, no diarrhea or constipation; no dysuria, no rashes      MEDICATIONS  (STANDING):  amLODIPine   Tablet 7.5 milliGRAM(s) Oral daily  atorvastatin 20 milliGRAM(s) Oral at bedtime  cefTRIAXone Injectable. 2000 milliGRAM(s) IV Push every 24 hours  cholecalciferol 1000 Unit(s) Oral daily  losartan 100 milliGRAM(s) Oral daily  metoprolol tartrate 50 milliGRAM(s) Oral two times a day  multivitamin 1 Tablet(s) Oral daily  tamsulosin 0.4 milliGRAM(s) Oral at bedtime  vancomycin  IVPB 750 milliGRAM(s) IV Intermittent every 12 hours    Vital Signs Last 24 Hrs  T(C): 36.8 (08 Jul 2019 04:25), Max: 38 (07 Jul 2019 20:30)  T(F): 98.2 (08 Jul 2019 04:25), Max: 100.4 (07 Jul 2019 20:30)  HR: 70 (08 Jul 2019 04:25) (70 - 96)  BP: 121/68 (08 Jul 2019 04:25) (121/68 - 145/74)  BP(mean): --  RR: 17 (08 Jul 2019 04:25) (16 - 18)  SpO2: 94% (08 Jul 2019 04:25) (94% - 96%)        Physical Exam:  Constitutional: frail looking  HEENT: NC/AT, EOMI, PERRLA, conjunctivae clear  Neck: supple; thyroid not palpable  Back: no tenderness  Respiratory: respiratory effort normal; decreased BS at bases  Cardiovascular: S1S2 regular, no murmurs  Abdomen: soft, not tender, not distended, positive BS  Genitourinary: no suprapubic tenderness  Lymphatic: no LN palpable  Musculoskeletal: no muscle tenderness, no joint swelling or tenderness  Has left hip tenderness  Left knee mild edema with effusion; no erythema  Extremities: 1+ pedal edema  Neurological/ Psychiatric: AxOx3, moving all extremities  Skin: no rashes; no palpable lesions    Labs: reviewed                                   10.3   7.45  )-----------( 183      ( 08 Jul 2019 08:25 )             31.8     07-08    136  |  100  |  16  ----------------------------<  119<H>  3.9   |  30  |  1.12    Ca    8.6      08 Jul 2019 08:25         Vancomycin Level, Trough: 12.8 ug/mL (07-07 @ 05:04)      Cultures:     Cell Count, Body Fluid (07.05.19 @ 18:30)    Monocyte/Macrophage Count - Body Fluid: 20 %    Fluid Segmented Granulocytes: 57 %    Fluid Type: Synovial fluid    Body Fluid Appearance: Slightly Cloudy    BF Lymphocytes: 23 %    Tube Type: Lavender    Color - Body Fluid: Yellow    Total Nucleated Cell Count, Body Fluid: 3880: Includes WBC and other nucleated cells if present. /uL    Total RBC Count: 4200 /uL    Crystals, Synovial Fluid (07.05.19 @ 18:30)    Synovial Crystals Clarity: Slightly Turbid    Synovial Crystals Tube: Sterile Screw Top    Synovial Crystals Color: Yellow    Synovial Crystals Id: None Seen    Culture - Blood (07.04.19 @ 20:11)    Specimen Source: .Blood Blood-Peripheral    Culture Results:   No growth to date.        Radiology: all available radiological tests reviewed    < from: MR Thoracic Spine w/wo IV Cont (07.05.19 @ 04:17) >  1. Multilevel degenerative disc disease and spondylosis primarily at T6-7   through T11-12.  2. No significant cord impingement is seen.   3. Minimal anterolisthesis T2-T3 on a degenerative basis.   4.  Abnormal enhancement and edema seen involving the inferior endplate   of T6 and superior endplate of T7 with minimal enhancement of the T6-7   intervertebral discs. These findings are suspicious with osteomyelitis   and discitis.    < end of copied text >    < from: MR Lumbar Spine w/wo IV Cont (07.05.19 @ 04:17) >  1. Disc herniation at T12-L1. edema at the inferior endplate of T12 and   anterior endplate of L1 with erosion and enhancement suspicious for   osteomyelitis and discitis.  2. Multilevel degenerative disc disease and spondylosis with severe   central stenosis at L3-4 and L4-5 and moderate central stenosis at L2-3   on a degenerative basis    < end of copied text >    < from: MR Cervical Spine w/wo IV Cont (07.05.19 @ 04:13) >  1. Degenerative marked reversal of the cervical lordosis.   2. Multilevel degenerative disc disease and spondylosis with narrowing of   the BILATERAL C3-4 through C7-T1 neural foramina and marked degenerative   cord impingement at C3-4 through C6-7.   3. There is abnormal increased signal of the cervical cord particularly   from   C3-C6-C7 likely cord edema/gliosis due to chronic cord compression.   4. No acute bony fracture.    < end of copied text >      Advanced directives addressed: full resuscitation

## 2019-07-08 NOTE — DIETITIAN INITIAL EVALUATION ADULT. - ADD RECOMMEND
1. c/w current diet rx. 2. add ensure enlive 1x/day 3. add MVI w/ minerals 4. encouarge intake at each meal and nutrition supplement between meals. 5. reinforce diet edu PRN.

## 2019-07-08 NOTE — PROGRESS NOTE ADULT - PROBLEM SELECTOR PLAN 1
Imaging confirms edema/enhancement in C6-7 and T12-01; suggestive of early Discitis    -Vancomycin 750 mg IV q12h and ceftriaxone 2 gm IV qd # 3  - ID consult appreciated, continue abx Imaging confirms edema/enhancement in C6-7 and T12-01; suggestive of early Discitis    -Vancomycin 750 mg IV q12h and ceftriaxone 2 gm IV qd # 4  - ID consult appreciated, continue abx  -Ortho Recc: CT Guided Bx of affected Disc  -ID to schedule PICC placement pending Bx results

## 2019-07-08 NOTE — PROGRESS NOTE ADULT - SUBJECTIVE AND OBJECTIVE BOX
24 Hr Events:   Pt seen and examined. Pain well controlled. No acute events overnight. Pain in left proximal tibia similar to how it was before aspiration. No acute changes in neurologic status. Still no back pain.    Vital Signs Last 24 Hrs  T(C): 36.8 (08 Jul 2019 04:25), Max: 38 (07 Jul 2019 20:30)  T(F): 98.2 (08 Jul 2019 04:25), Max: 100.4 (07 Jul 2019 20:30)  HR: 70 (08 Jul 2019 04:25) (70 - 96)  BP: 121/68 (08 Jul 2019 04:25) (121/68 - 145/74)  BP(mean): --  RR: 17 (08 Jul 2019 04:25) (16 - 18)  SpO2: 94% (08 Jul 2019 04:25) (94% - 96%)        PHYSICAL EXAM:  General; Awake and alert, Oriented x 3  Hips/Pelvis:   Able to SLR bilaterally. Negative log roll, heel strike. No pain on passive Int/Ext hip rotation.  Spine exam:  Neck: supple, NT, Full Painless baseline AROM  Back: no skin changes or any tenderness to palpation.   Spine:                       Motor exam:          Right Upper extremity: Delt 5/5, Biceps 5/5, Triceps 5/5, Wrist Ext 5/5, Wrist Flexion 5/5,  Strength 5/5         SILT C5-S1, No Clonus, Negative Mejia, +RP, Compartments soft           Left Upper extremity: Delt 5/5, Biceps 5/5, Triceps 5/5, Wrist Ext 5/5, Wrist Flexion 5/5,  Strength 5/5         SILT C5-S1, No Clonus, Negative Mejia, +RP, Compartments soft           Right Lower Extremity: Hip Flexion 5/5, Hip Extension 5/5, Knee Flexion 5/5, Knee Extension 5/5, Ankle Dorsiflexion 5/5,          Ankle Plantar Flexion 5/5, Extensor hallucis Longus 5/5         SILT L2-S1, No pain with SLR, Negative Clonus, Negative Babinski                  Left Lower Extremity: Hip Flexion 5/5, Hip Extension 5/5, Knee Flexion 5/5, Knee Extension 5/5, Ankle Dorsiflexion 5/5,          Ankle Plantar Flexion 5/5, Extensor hallucis Longus 5/5         SILT L2-S1, No pain with SLR, Negative Clonus, Negative Babinski

## 2019-07-08 NOTE — PROGRESS NOTE ADULT - ASSESSMENT
83M with acute on chronic L knee pain, rule out septic arthritis     - s/p L knee aspiration 7/5, GS neg, Crystals Neg, CC 3.8K, Asp Cx NGTD  - No role for any acute surgical intervention from a general orthopedic standpoint regarding the L knee/leg should the cultures remain positive.   - Recommend broad spectrum IV Abx per ID  - FU BCx  - continues to have pain over proximal tibia, may consider MRI vs bone scan, will discuss with Dr. Hull  - DARRIAN attending Dr. Hull, agrees with above.

## 2019-07-08 NOTE — DIETITIAN INITIAL EVALUATION ADULT. - FACTORS AFF FOOD INTAKE
persistent lack of appetite/pt c/o lack of appetite x 3 days PTA and only able to consume small portions throughout the day. Pt currently feels that appetite has improved however dislikes food options available at /Denominational/ethnic/cultural/personal food preferences

## 2019-07-08 NOTE — PROGRESS NOTE ADULT - ATTENDING COMMENTS
Patient seen and examined with Family Medicine Residents Lamine Fraser and Regla Ferreira on the Family Medicine Teaching Service.  Agree with history, physical, labs and plan which were reviewed in detail after a face to face encounter with the patient.
Patient seen and examined with Family Medicine Residents Drs. Edward Kayserian, Chalo Fraser, Regla Ferreira and Trevin Whaley on the Family Medicine Teaching Service.  Agree with history, physical, labs and plan which were reviewed in detail after a face to face encounter with the patient.
Patient seen and examined with Family Medicine Residents Drs. Edward Kayserian and Trevin Campbell on the Family Medicine Teaching Service.  Agree with history, physical, labs and plan which were reviewed in detail after a face to face encounter with the patient.
on exam- comfortable   -rs-aeeb, cta  -p/a-soft, bs+  -cvs-s1s2 normal     #MRI study to follow     #ct abx

## 2019-07-08 NOTE — DIETITIAN INITIAL EVALUATION ADULT. - OTHER INFO
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 Upon visit pt OOB sitting in chair w/ wife at bedside. Pt c/o poor appetite and decreased intake PTA x 3 days r/t overall not feeling well he associates w/ fever. Pt reports since admission his appetite has improved somewhat however pt has a difficult time finding foods he likes at . Pt noted w/ % intake x 2 meals during admission. Pt reports  which is consistent w/ admission wt. Pt noted w/ +1 edema r/l ankles. 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 patient states that he has had left knee > hip pain over the last several days but has since progressively worsened preventing him from walking. At baseline the patient is a community ambulator with the assistance of a cane. Pt was found w/ sepsis w/ unknown etiology. Upon visit pt OOB sitting in chair w/ wife at bedside. Pt c/o poor appetite and decreased intake PTA x 3 days r/t overall not feeling well he associates w/ fever. Pt reports since admission his appetite has improved somewhat however pt has a difficult time finding foods he likes at . Pt noted w/ % intake x 2 meals during admission. Pt reports  which is consistent w/ admission wt. Pt noted w/ +1 edema r/l ankles. NFPE performed and significant for severe muscle wasting: calvicle region; moderate: deltoid; fat depletion: moderate: triceps. Based on above pt meets criteria for moderate malnutrition in the context of social/environmental circumstances AEB moderate/severe muscle wasting, moderate fat depletion, mild edema. D/w pt and recommend adding ensure enlive 1x/day to supplement PO intake. Pt w/ hx of HTN and on warfarin. Reviewed heart healthy diet edu and vitamin K and medication interaction diet edu. Pt was receptive to information. Provided written instruction w/ contact information. No c/o chewing/sawllowing difficulties. No current c/o n/v/c/d; c/o constipation on admission however reports resolved. Dawit Hastings.

## 2019-07-08 NOTE — PROGRESS NOTE ADULT - PROBLEM SELECTOR PLAN 3
- Irbesartan 300mg po daily  - Amlodipine 7.5mg po daily  - Metoprolol 50mg po q12h
-PTeval appreciated
- Irbesartan 300mg po daily  - Amlodipine 7.5mg po daily  - Metoprolol 50mg po q12h
-F/U PTeval ambulate as tolerated

## 2019-07-08 NOTE — PROGRESS NOTE ADULT - PROBLEM SELECTOR PROBLEM 1
HTN (hypertension)
Discitis, unspecified, multiple sites in spine
HTN (hypertension)
Discitis, unspecified, multiple sites in spine

## 2019-07-08 NOTE — PROGRESS NOTE ADULT - PROBLEM SELECTOR PLAN 5
-PT eval appreciated
-given Piperacillin/tazobactam 3.375g IVPB x 1 + Vancomycin 1g IVPB x 1 in the ED  -cont. IV abx  -Imaging confirms edema/enhancement in C6-7 and T12-01; suggestive of early Discitis
-PT eval appreciated
-given Piperacillin/tazobactam 3.375g IVPB x 1 + Vancomycin 1g IVPB x 1 in the ED  -cont. IV abx  -Imaging cofirms edema/enhancement in C6-7 and T12-01; suggestive of early Discitis

## 2019-07-08 NOTE — DIETITIAN INITIAL EVALUATION ADULT. - PHYSICAL APPEARANCE
other (specify)/BMI 28.3 NFPE significant for severe muscle wasting: clavicle region; moderate: deltoid; fat depletion: moderate: triceps

## 2019-07-08 NOTE — PROGRESS NOTE ADULT - SUBJECTIVE AND OBJECTIVE BOX
24 Hr Events:  Pt seen and examined. No acute events overnight. Pain over proximal tibia similar to how it felt before knee aspiration.    Vital Signs Last 24 Hrs  T(C): 36.8 (08 Jul 2019 04:25), Max: 38 (07 Jul 2019 20:30)  T(F): 98.2 (08 Jul 2019 04:25), Max: 100.4 (07 Jul 2019 20:30)  HR: 70 (08 Jul 2019 04:25) (70 - 96)  BP: 121/68 (08 Jul 2019 04:25) (121/68 - 145/74)  BP(mean): --  RR: 17 (08 Jul 2019 04:25) (16 - 18)  SpO2: 94% (08 Jul 2019 04:25) (94% - 96%)    PE   LLE:  Previous incisions well healed.   Very subtle faint blanchable erythema over patella. Otherwise no redness.   No warmth or fluctuance.   Small knee effusion.   TTP over proximal aspect of anteromedial tibial.   Mild medial joint line TTP.   No hip TTP.   Neg log roll. Able to SLR without pain.   Knee ROM 5-120 without any painful ROM.   Compartments soft  No TTP to ankle/foot   Motor intact GS/TA/FHL/EHL  SILT L2-S1  DP/PT pulses 2+

## 2019-07-08 NOTE — PROGRESS NOTE ADULT - PROBLEM SELECTOR PROBLEM 2
Chronic deep vein thrombosis (DVT) of femoral vein of left lower extremity

## 2019-07-08 NOTE — PROGRESS NOTE ADULT - SUBJECTIVE AND OBJECTIVE BOX
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 The patient states that he has had left knee > hip pain over the last several days but has since progressively worsened preventing him from walking. At baseline the patient is a community ambulator with the assistance of a cane. Vitals in triage => /80, /min, RR 22/min, Tmax 102.2'F. Labs => ESR 90, Hgb/Hct 12.4/37.7, INR 3.08, K 3.4, Alb 3.0. CT Chest/ABD/Pelvis => Hepatic steatosis. Punctate non-obstructing kidney stones bilaterally. Bilateral cortical and parapelvic cysts. Mild prostatic enlargement. Inferior vena cava filter is present. Surgical hardware in the left acetabulum and ischium. Severe osteoarthritis in the left hip. In the ED the patient received Acetaminophen 975mg PO x 1, Morphine 4mg IVP x 1, Piperacillin/tazobactam 3.375g IVPB x 1, Vancomycin 1g IVPB x 1, and NS     SUBJECTIVE: Pt was examined at bedside this morning, pt still spiking fevers, Tmax of 100.4 present last night. Pt c/o pain in his (L) knee on ambulation, and reports no problems with his back (MRI concering for Di . He is 3d S/P aspiration of the effected knee, analysis of aspirate is negative for infectious origin. Ortho recommends IR guided spinal tap to ID source of infection.      Knee fluid cultures appreciated, negative, leukocyte count not likely to be infeciton. Patient blood cultures are still without result, patient admits knee pain, denies back pain, febrile episode overnight.      REVIEW OF SYSTEMS:    CONSTITUTIONAL: + weakness, fevers and chills  EYES/ENT: No visual changes;  No vertigo or throat pain   NECK: No pain or stiffness  RESPIRATORY: No cough, wheezing, hemoptysis; No shortness of breath  CARDIOVASCULAR: No chest pain or palpitations  GASTROINTESTINAL: No abdominal or epigastric pain  GENITOURINARY: No dysuria, frequency or hematuria  NEUROLOGICAL: No numbness or weakness  SKIN: No itching, burning, rashes, or lesions   All other review of systems is negative unless indicated above    Vital Signs Last 24 Hrs  T(C): 36.8 (07 Jul 2019 12:09), Max: 38.6 (06 Jul 2019 17:30)  T(F): 98.2 (07 Jul 2019 12:09), Max: 101.5 (06 Jul 2019 17:30)  HR: 81 (07 Jul 2019 12:09) (73 - 94)  BP: 138/71 (07 Jul 2019 12:09) (129/65 - 138/71)  BP(mean): --  RR: 16 (07 Jul 2019 12:09) (16 - 18)  SpO2: 94% (07 Jul 2019 12:09) (91% - 95%)    I&O's Summary    05 Jul 2019 07:01  -  05 Jul 2019 18:38  --------------------------------------------------------  IN: 1000 mL / OUT: 0 mL / NET: 1000 mL        < from: MR Thoracic Spine w/wo IV Cont (07.05.19 @ 04:17) >  IMPRESSION:   1. Multilevel degenerative disc disease and spondylosis primarily at T6-7   through T11-12.  2. No significant cord impingement is seen.   3. Minimal anterolisthesis T2-T3 on a degenerative basis.   4.  Abnormal enhancement and edema seen involving the inferior endplate   of T6 and superior endplate of T7 with minimal enhancement of the T6-7   intervertebral discs. These findings are suspicious with osteomyelitis   and discitis.          PHYSICAL EXAM:    Constitutional: NAD, awake and alert  HEENT:  Normal Hearing,   Respiratory: Breath sounds are clear bilaterally, No wheezing, rales or rhonchi  Cardiovascular: S1 and S2, regular rate and rhythm, no Murmurs, gallops or rubs  Gastrointestinal: Bowel Sounds present, soft, nontender, nondistended, no guarding, no rebound  Extremities: No peripheral edema,   Vascular: 2+ peripheral pulses  Neurological: A/O x 3, no focal deficits  Musculoskeletal: 5/5 strength and full ROM of (R)  and LE; (L) LE improved from yesterday.  Skin: No rashes    MEDICATIONS  (STANDING):  amLODIPine   Tablet 7.5 milliGRAM(s) Oral daily  atorvastatin 20 milliGRAM(s) Oral at bedtime  cefTRIAXone Injectable. 2000 milliGRAM(s) IV Push every 24 hours  cholecalciferol 1000 Unit(s) Oral daily  losartan 100 milliGRAM(s) Oral daily  metoprolol tartrate 50 milliGRAM(s) Oral two times a day  multivitamin 1 Tablet(s) Oral daily  tamsulosin 0.4 milliGRAM(s) Oral at bedtime  vancomycin  IVPB 750 milliGRAM(s) IV Intermittent every 12 hours    MEDICATIONS  (PRN):  acetaminophen   Tablet .. 650 milliGRAM(s) Oral every 6 hours PRN Temp greater or equal to 38C (100.4F), Mild Pain (1 - 3)  docusate sodium 100 milliGRAM(s) Oral three times a day PRN Constipation  lidocaine   Patch 1 Patch Transdermal daily PRN intermittent pain, treat with patch as needed  ondansetron Injectable 4 milliGRAM(s) IV Push every 6 hours PRN Nausea  oxyCODONE    IR 5 milliGRAM(s) Oral every 4 hours PRN Moderate Pain (4 - 6)  senna 2 Tablet(s) Oral at bedtime PRN Constipation  traMADol 75 milliGRAM(s) Oral every 6 hours PRN Severe Pain (7 - 10)    LABS: All Labs Reviewed:                                     11.2   8.13  )-----------( 198      ( 07 Jul 2019 11:06 )             34.3   07-07    141  |  104  |  22  ----------------------------<  127<H>  3.8   |  28  |  1.24    Ca    8.8      07 Jul 2019 05:04    Culture - Body Fluid with Gram Stain (07.05.19 @ 18:30)    Gram Stain:   polymorphonuclear leukocytes seen  No organisms seen  by cytocentrifuge    Specimen Source: .Body Fluid Synovial Fluid    Culture Results:   No growth    Culture - Urine (07.04.19 @ 22:09)    Specimen Source: .Urine None    Culture Results:   No growth    Crystals, Synovial Fluid (07.05.19 @ 18:30)    Synovial Crystals Clarity: Slightly Turbid    Synovial Crystals Tube: Sterile Screw Top    Synovial Crystals Color: Yellow    Synovial Crystals Id: None Seen    Cell Count, Body Fluid (07.05.19 @ 18:30)    Monocyte/Macrophage Count - Body Fluid: 20 %    Fluid Segmented Granulocytes: 57 %    Fluid Type: Synovial fluid    Body Fluid Appearance: Slightly Cloudy    BF Lymphocytes: 23 %    Tube Type: Lavender    Color - Body Fluid: Yellow    Total Nucleated Cell Count, Body Fluid: 3880: Includes WBC and other nucleated cells if present. /uL    Total RBC Count: 4200 /uL    < from: MR Thoracic Spine w/wo IV Cont (07.05.19 @ 04:17) >  IMPRESSION:   1. Multilevel degenerative disc disease and spondylosis primarily at T6-7   through T11-12.  2. No significant cord impingement is seen.   3. Minimal anterolisthesis T2-T3 on a degenerative basis.   4.  Abnormal enhancement and edema seen involving the inferior endplate   of T6 and superior endplate of T7 with minimal enhancement of the T6-7   intervertebral discs. These findings are suspicious with osteomyelitis   and discitis.    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 The patient states that he has had left knee > hip pain over the last several days but has since progressively worsened preventing him from walking. At baseline the patient is a community ambulator with the assistance of a cane. Vitals in triage => /80, /min, RR 22/min, Tmax 102.2'F. Labs => ESR 90, Hgb/Hct 12.4/37.7, INR 3.08, K 3.4, Alb 3.0. CT Chest/ABD/Pelvis => Hepatic steatosis. Punctate non-obstructing kidney stones bilaterally. Bilateral cortical and parapelvic cysts. Mild prostatic enlargement. Inferior vena cava filter is present. Surgical hardware in the left acetabulum and ischium. Severe osteoarthritis in the left hip. In the ED the patient received Acetaminophen 975mg PO x 1, Morphine 4mg IVP x 1, Piperacillin/tazobactam 3.375g IVPB x 1, Vancomycin 1g IVPB x 1, and NS     SUBJECTIVE: Pt was examined at bedside this morning, pt still spiking fevers, Tmax of 100.4 present last night. Pt c/o pain in his (L) knee on ambulation, and reports no problems with his back (Pt's MRI concerning for Discitis T6-7; T12-L1). Pt is 3d S/P aspiration of the effected knee, analysis of aspirate is negative for infectious origin. Ortho recommends CT  guided Bx of the effected disc to ID source of infection. ID awaiting results as well as accepting [facility] company to schedule PICC line placement prior to D/C.       REVIEW OF SYSTEMS:    CONSTITUTIONAL: + weakness, fevers and chills  EYES/ENT: No visual changes;  No vertigo or throat pain   NECK: No pain or stiffness  RESPIRATORY: No cough, wheezing, hemoptysis; No shortness of breath  CARDIOVASCULAR: No chest pain or palpitations  GASTROINTESTINAL: No abdominal or epigastric pain  GENITOURINARY: No dysuria, frequency or hematuria  NEUROLOGICAL: No numbness or weakness  SKIN: No itching, burning, rashes, or lesions   All other review of systems is negative unless indicated above    Vital Signs Last 24 Hrs  T(C): 36.8 (08 Jul 2019 13:05), Max: 38 (07 Jul 2019 20:30)  T(F): 98.2 (08 Jul 2019 13:05), Max: 100.4 (07 Jul 2019 20:30)  HR: 80 (08 Jul 2019 13:05) (70 - 96)  BP: 119/65 (08 Jul 2019 13:05) (119/65 - 145/74)  BP(mean): --  RR: 18 (08 Jul 2019 13:05) (17 - 18)  SpO2: 96% (08 Jul 2019 13:05) (94% - 96%)        < from: MR Thoracic Spine w/wo IV Cont (07.05.19 @ 04:17) >  IMPRESSION:   1. Multilevel degenerative disc disease and spondylosis primarily at T6-7   through T11-12.  2. No significant cord impingement is seen.   3. Minimal anterolisthesis T2-T3 on a degenerative basis.   4.  Abnormal enhancement and edema seen involving the inferior endplate   of T6 and superior endplate of T7 with minimal enhancement of the T6-7   intervertebral discs. These findings are suspicious with osteomyelitis   and discitis.    MEDICATIONS  (STANDING):  amLODIPine   Tablet 7.5 milliGRAM(s) Oral daily  atorvastatin 20 milliGRAM(s) Oral at bedtime  cefTRIAXone Injectable. 2000 milliGRAM(s) IV Push every 24 hours  cholecalciferol 1000 Unit(s) Oral daily  losartan 100 milliGRAM(s) Oral daily  metoprolol tartrate 50 milliGRAM(s) Oral two times a day  multivitamin 1 Tablet(s) Oral daily  tamsulosin 0.4 milliGRAM(s) Oral at bedtime  vancomycin  IVPB 750 milliGRAM(s) IV Intermittent every 12 hours    MEDICATIONS  (PRN):  acetaminophen   Tablet .. 650 milliGRAM(s) Oral every 6 hours PRN Temp greater or equal to 38C (100.4F), Mild Pain (1 - 3)  docusate sodium 100 milliGRAM(s) Oral three times a day PRN Constipation  lidocaine   Patch 1 Patch Transdermal daily PRN intermittent pain, treat with patch as needed  ondansetron Injectable 4 milliGRAM(s) IV Push every 6 hours PRN Nausea  oxyCODONE    IR 5 milliGRAM(s) Oral every 4 hours PRN Moderate Pain (4 - 6)  senna 2 Tablet(s) Oral at bedtime PRN Constipation  traMADol 75 milliGRAM(s) Oral every 6 hours PRN Severe Pain (7 - 10)        PHYSICAL EXAM:    Constitutional: NAD, awake and alert  HEENT:  Normal Hearing,   Respiratory: Breath sounds are clear bilaterally, No wheezing, rales or rhonchi  Cardiovascular: S1 and S2, regular rate and rhythm, no Murmurs, gallops or rubs  Gastrointestinal: Bowel Sounds present, soft, nontender, nondistended, no guarding, no rebound  Extremities: No peripheral edema, Mild (L) Knee swelling w/o erythema, currently wrapped in bandage   Vascular: 2+ peripheral pulses  Neurological: A/O x 3, no focal deficits  Musculoskeletal: 5/5 strength and full ROM of (R)  and LE; (L) LE same from yesterday increased ROM from day of admission.  Skin: No rashes        LABS: All Labs Reviewed:                                        10.3   7.45  )-----------( 183      ( 08 Jul 2019 08:25 )             31.8     07-08    136  |  100  |  16  ----------------------------<  119<H>  3.9   |  30  |  1.12    Ca    8.6      08 Jul 2019 08:25    Prothrombin Time and INR, Plasma in AM (07.08.19 @ 08:25)    Prothrombin Time, Plasma: 23.8: Effective October 30th, 2018 the reference range for PT has changed. sec    INR: 2.10 ratio          Culture - Body Fluid with Gram Stain (07.05.19 @ 18:30)    Gram Stain:   polymorphonuclear leukocytes seen  No organisms seen  by cytocentrifuge    Specimen Source: .Body Fluid Synovial Fluid    Culture Results:   No growth    Culture - Urine (07.04.19 @ 22:09)    Specimen Source: .Urine None    Culture Results:   No growth    Crystals, Synovial Fluid (07.05.19 @ 18:30)    Synovial Crystals Clarity: Slightly Turbid    Synovial Crystals Tube: Sterile Screw Top    Synovial Crystals Color: Yellow    Synovial Crystals Id: None Seen    Cell Count, Body Fluid (07.05.19 @ 18:30)    Monocyte/Macrophage Count - Body Fluid: 20 %    Fluid Segmented Granulocytes: 57 %    Fluid Type: Synovial fluid    Body Fluid Appearance: Slightly Cloudy    BF Lymphocytes: 23 %    Tube Type: Lavender    Color - Body Fluid: Yellow    Total Nucleated Cell Count, Body Fluid: 3880: Includes WBC and other nucleated cells if present. /uL    Total RBC Count: 4200 /uL    < from: MR Thoracic Spine w/wo IV Cont (07.05.19 @ 04:17) >  IMPRESSION:   1. Multilevel degenerative disc disease and spondylosis primarily at T6-7   through T11-12.  2. No significant cord impingement is seen.   3. Minimal anterolisthesis T2-T3 on a degenerative basis.   4.  Abnormal enhancement and edema seen involving the inferior endplate   of T6 and superior endplate of T7 with minimal enhancement of the T6-7   intervertebral discs. These findings are suspicious with osteomyelitis   and discitis.    < end of copied text >

## 2019-07-08 NOTE — PROGRESS NOTE ADULT - SUBJECTIVE AND OBJECTIVE BOX
Case reviewed with spine JUAN Benjamin--no neuro symptoms and no back pain neuro intact on antibiotics as per ID

## 2019-07-08 NOTE — PROGRESS NOTE ADULT - PROBLEM SELECTOR PLAN 2
-  Coumadin at home  - Patient has been supratherapeutic  - Trend PT/INR  - Coumadin qhs to be dosed as necessary -  Coumadin at home  - INR: 2.10 (on 7/8) will re-instate Coumadin 5mg tonight and stephanie a.m PT/INR     - Coumadin qhs to be dosed as necessary

## 2019-07-08 NOTE — PROGRESS NOTE ADULT - PROBLEM SELECTOR PROBLEM 5
Osteoarthritis of left hip, unspecified osteoarthritis type
Discitis, unspecified, multiple sites in spine
Discitis, unspecified, multiple sites in spine
Osteoarthritis of left hip, unspecified osteoarthritis type

## 2019-07-08 NOTE — PROGRESS NOTE ADULT - ASSESSMENT
hx of osteomyelitis  treating for possible recurrence as per ID--to assess response following labs and clinical presentation

## 2019-07-08 NOTE — PROGRESS NOTE ADULT - ASSESSMENT
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 The patient states that he has had left knee > hip pain over the last several days but has since progressively worsened preventing him from walking. At baseline the patient is a community ambulator with the assistance of a cane. In ER he was febrile Tmax 102.2'F. In the ED the patient received Piperacillin/tazobactam 3.375g IVPB, Vancomycin 1g IVPB x 1.    1. Febrile syndrome. Possible sepsis. T6-7 probable discitis/OM. DDD. Left hip pain ?underlying OA ?infection. Left knee effusion s/p arthrocentesis.  - doubt left knee septic arthritis  - blood cx no growth, L knee fluid analysis reviewed, cx NGTD  - ortho evaluation appreciated: conservative management at this time  - on vancomycin 750 mg IV q12h and ceftriaxone 2 gm IV qd # 4  - tolerating abx well so far; no side effects noted  - vancomycin trough level is therapeutic  - continue abx coverage  - plan for eventual PICC line and 6 wk long term abx coverage with above regimen along with spinal surgery f/u  - monitor temps  - f/u CBC  - supportive care  2. Other issues:   -care per medicine

## 2019-07-08 NOTE — PROGRESS NOTE ADULT - ASSESSMENT
83M w/ hx vertebral osteomyelitis (~5yrs ago, tx with IV abx) presents with fever of unknown origin, acute T6-7/T12-L1 Verte OM/discitis     -No neck or back pain. No neurological deficits. No bowel or bladder incontinence  -MRI C/T/L Spine demonstrating abnormalities in T6-7, T12-L1 suspicious for osteomyelitis and discitis per radiology  -Prior MRI results from previous infection from [facility] obtained, which demonstrated C6-7, T9/10 osteomyelitis/discitis in 2015  -Continue IV abx   -BCx NGTD  -ID recommendations   -No surgical intervention at this time based on current neurological exam  -DW Dr. [place].

## 2019-07-08 NOTE — PROGRESS NOTE ADULT - PROBLEM SELECTOR PLAN 6
on Coumadin -Currently on SCDs as Coumadin was d/c'd for supratheraputic INR  -Will re-instate and trend a.m PT/INR

## 2019-07-08 NOTE — PROGRESS NOTE ADULT - PROBLEM SELECTOR PROBLEM 4
Arthritis
Osteoarthritis of left hip, unspecified osteoarthritis type
Osteoarthritis of left hip, unspecified osteoarthritis type
Arthritis

## 2019-07-08 NOTE — DIETITIAN INITIAL EVALUATION ADULT. - PERTINENT LABORATORY DATA
07-08 Na136 mmol/L Glu 119 mg/dL<H> K+ 3.9 mmol/L Cr  1.12 mg/dL BUN 16 mg/dL Phos n/a   Alb n/a   PAB n/a

## 2019-07-09 ENCOUNTER — TRANSCRIPTION ENCOUNTER (OUTPATIENT)
Age: 84
End: 2019-07-09

## 2019-07-09 VITALS
OXYGEN SATURATION: 96 % | DIASTOLIC BLOOD PRESSURE: 69 MMHG | SYSTOLIC BLOOD PRESSURE: 134 MMHG | TEMPERATURE: 98 F | HEART RATE: 76 BPM | RESPIRATION RATE: 18 BRPM

## 2019-07-09 PROCEDURE — 71045 X-RAY EXAM CHEST 1 VIEW: CPT | Mod: 26

## 2019-07-09 RX ORDER — VANCOMYCIN HCL 1 G
750 VIAL (EA) INTRAVENOUS
Qty: 0 | Refills: 0 | DISCHARGE
Start: 2019-07-09

## 2019-07-09 RX ORDER — LIDOCAINE 4 G/100G
1 CREAM TOPICAL
Qty: 7 | Refills: 0
Start: 2019-07-09 | End: 2019-07-15

## 2019-07-09 RX ORDER — ACETAMINOPHEN 500 MG
1 TABLET ORAL
Qty: 28 | Refills: 0
Start: 2019-07-09 | End: 2019-07-15

## 2019-07-09 RX ORDER — OXYCODONE HYDROCHLORIDE 5 MG/1
1 TABLET ORAL
Qty: 30 | Refills: 0
Start: 2019-07-09 | End: 2019-07-13

## 2019-07-09 RX ORDER — CEFTRIAXONE 500 MG/1
2 INJECTION, POWDER, FOR SOLUTION INTRAMUSCULAR; INTRAVENOUS
Qty: 0 | Refills: 0 | DISCHARGE
Start: 2019-07-09

## 2019-07-09 RX ADMIN — TRAMADOL HYDROCHLORIDE 75 MILLIGRAM(S): 50 TABLET ORAL at 01:25

## 2019-07-09 RX ADMIN — Medication 1 TABLET(S): at 11:44

## 2019-07-09 RX ADMIN — AMLODIPINE BESYLATE 7.5 MILLIGRAM(S): 2.5 TABLET ORAL at 05:11

## 2019-07-09 RX ADMIN — CEFTRIAXONE 2000 MILLIGRAM(S): 500 INJECTION, POWDER, FOR SOLUTION INTRAMUSCULAR; INTRAVENOUS at 11:43

## 2019-07-09 RX ADMIN — Medication 250 MILLIGRAM(S): at 05:07

## 2019-07-09 RX ADMIN — TRAMADOL HYDROCHLORIDE 75 MILLIGRAM(S): 50 TABLET ORAL at 00:18

## 2019-07-09 RX ADMIN — Medication 50 MILLIGRAM(S): at 05:11

## 2019-07-09 RX ADMIN — LOSARTAN POTASSIUM 100 MILLIGRAM(S): 100 TABLET, FILM COATED ORAL at 05:11

## 2019-07-09 RX ADMIN — Medication 1000 UNIT(S): at 11:44

## 2019-07-09 NOTE — PROGRESS NOTE ADULT - SUBJECTIVE AND OBJECTIVE BOX
Patient remains on broad spectrum antibiotics for T6-7,  T12-L1 suspicious for osteomyelitis and discitis.   Resting comfortable in bed.,  Afebrile,   CT guided biopsy recommend.   Continue abx coverage as per ID.

## 2019-07-09 NOTE — DISCHARGE NOTE PROVIDER - HOSPITAL COURSE
Patient is 82yo M PMHx OA, LLE DVT on Coumadin, IVC filter, HTN, BPH, Vertebral Osteomyelitis, presented to [facility] ED on 7/4/19 with knee pain and fevers. Patient was noticed to have swelling of the R knee, and orthopedics were consulted, in setting of fevers and found to have elevated ESR, patient was admitted for possible septic arthritis and sepsis. Patient not found to be clinically at risk of septic arthritis of the knee, however admitted for sepsis, followed by medical team, Orthopedics, and Infectious Disease. Suspicion for Discitis due to elevated ESR, and history of discitis, MRI sent from orthopedic team, found Discitis in T6-T7 & T12-L1. Patient begun appropriate antibiotics. Infectious Disease decided on IV antibiotics for a 6 week course, for suspicion of osteomyelitis due to area of disease, and high risk. Patient treated with Ceftriaxone 2g IV QD, and Vancomycin 750mg IV q12h, PICC line placement on 7/9/19 for discharge home with home PT and visiting nurse service. Patient R knee pain likely due to OA, xray unimpressive but due to history of surgery, post-surgical inflammation may also contribute, multifactorial pain syndrome, may follow up with orthopedic outside of the hospital.            Vital Signs Last 24 Hrs    T(C): 36.8 (09 Jul 2019 12:04), Max: 37.8 (08 Jul 2019 21:10)    T(F): 98.3 (09 Jul 2019 12:04), Max: 100.1 (08 Jul 2019 21:10)    HR: 76 (09 Jul 2019 12:04) (76 - 95)    BP: 134/69 (09 Jul 2019 12:04) (130/64 - 174/77)    BP(mean): --    RR: 18 (09 Jul 2019 12:04) (18 - 18)    SpO2: 96% (09 Jul 2019 12:04) (94% - 97%)        PHYSICAL EXAM:        Constitutional: NAD, awake and alert, lying down comfortably    HEENT:  Normal Hearing,     Respiratory: Breath sounds are clear bilaterally, No wheezing, rales or rhonchi    Cardiovascular: S1 and S2, regular rate and rhythm, no Murmurs, gallops or rubs    Gastrointestinal: Bowel Sounds present, soft, nontender, nondistended, no guarding, no rebound    Extremities: No peripheral edema,     Vascular: 2+ peripheral pulses    Neurological: A/O x 3, no focal deficits    Musculoskeletal: 5/5 strength, Limited ROM in LLE due to pain in L knee, patient with difficulty on ambulation due to pain/ataxic gait.    Skin: No rashes                                10.3     7.45  )-----------( 183      ( 08 Jul 2019 08:25 )               31.8     07-08        136  |  100  |  16    ----------------------------<  119<H>    3.9   |  30  |  1.12        Ca    8.6      08 Jul 2019 08:25        Prothrombin Time and INR, Plasma in AM (07.08.19 @ 08:25)      Prothrombin Time, Plasma: 23.8: Effective October 30th, 2018 the reference range for PT has changed. sec      INR: 2.10 ratio            < from: MR Thoracic Spine w/wo IV Cont (07.05.19 @ 04:17) >        IMPRESSION:     1. Multilevel degenerative disc disease and spondylosis primarily at T6-7     through T11-12.    2. No significant cord impingement is seen.     3. Minimal anterolisthesis T2-T3 on a degenerative basis.     4.  Abnormal enhancement and edema seen involving the inferior endplate     of T6 and superior endplate of T7 with minimal enhancement of the T6-7     intervertebral discs. These findings are suspicious with osteomyelitis     and discitis.        < end of copied text >        < from: Xray Knee 3 Views, Left (07.04.19 @ 21:24) >        IMPRESSION: No fracture or dislocation. Tiny joint effusion.        < end of copied text > Patient is 82yo M PMHx OA, LLE DVT on Coumadin, IVC filter, HTN, BPH, Vertebral Osteomyelitis, presented to [facility] ED on 7/4/19 with knee pain and fevers. Patient was noticed to have swelling of the R knee, and orthopedics were consulted, in setting of fevers and found to have elevated ESR, patient was admitted for possible septic arthritis and sepsis. Patient not found to be clinically at risk of septic arthritis of the knee, however admitted for sepsis, followed by medical team, Orthopedics, and Infectious Disease. Suspicion for Discitis due to elevated ESR, and history of discitis, MRI sent from orthopedic team, found Discitis in T6-T7 & T12-L1. Patient begun appropriate antibiotics. Infectious Disease decided on IV antibiotics for a 6 week course, to be completed on 8/15/19, for discitis and suspicion of vertebral osteomyelitis. Patient treated with Ceftriaxone 2g IV QD, and Vancomycin 750mg IV q12h, PICC line placement on 7/9/19 for discharge home with home PT and visiting nurse service. Patient R knee pain likely due to OA, xray unimpressive but due to history of surgery, post-surgical inflammation may also contribute, multifactorial pain syndrome, may follow up with orthopedic outside of the hospital.            Vital Signs Last 24 Hrs    T(C): 36.8 (09 Jul 2019 12:04), Max: 37.8 (08 Jul 2019 21:10)    T(F): 98.3 (09 Jul 2019 12:04), Max: 100.1 (08 Jul 2019 21:10)    HR: 76 (09 Jul 2019 12:04) (76 - 95)    BP: 134/69 (09 Jul 2019 12:04) (130/64 - 174/77)    BP(mean): --    RR: 18 (09 Jul 2019 12:04) (18 - 18)    SpO2: 96% (09 Jul 2019 12:04) (94% - 97%)        PHYSICAL EXAM:        Constitutional: NAD, awake and alert, lying down comfortably    HEENT:  Normal Hearing,     Respiratory: Breath sounds are clear bilaterally, No wheezing, rales or rhonchi    Cardiovascular: S1 and S2, regular rate and rhythm, no Murmurs, gallops or rubs    Gastrointestinal: Bowel Sounds present, soft, nontender, nondistended, no guarding, no rebound    Extremities: No peripheral edema,     Vascular: 2+ peripheral pulses    Neurological: A/O x 3, no focal deficits    Musculoskeletal: 5/5 strength, Limited ROM in LLE due to pain in L knee, patient with difficulty on ambulation due to pain/ataxic gait.    Skin: No rashes                                10.3     7.45  )-----------( 183      ( 08 Jul 2019 08:25 )               31.8     07-08        136  |  100  |  16    ----------------------------<  119<H>    3.9   |  30  |  1.12        Ca    8.6      08 Jul 2019 08:25        Prothrombin Time and INR, Plasma in AM (07.08.19 @ 08:25)      Prothrombin Time, Plasma: 23.8: Effective October 30th, 2018 the reference range for PT has changed. sec      INR: 2.10 ratio            < from: MR Thoracic Spine w/wo IV Cont (07.05.19 @ 04:17) >        IMPRESSION:     1. Multilevel degenerative disc disease and spondylosis primarily at T6-7     through T11-12.    2. No significant cord impingement is seen.     3. Minimal anterolisthesis T2-T3 on a degenerative basis.     4.  Abnormal enhancement and edema seen involving the inferior endplate     of T6 and superior endplate of T7 with minimal enhancement of the T6-7     intervertebral discs. These findings are suspicious with osteomyelitis     and discitis.        < end of copied text >        < from: Xray Knee 3 Views, Left (07.04.19 @ 21:24) >        IMPRESSION: No fracture or dislocation. Tiny joint effusion.        < end of copied text >

## 2019-07-09 NOTE — PROGRESS NOTE ADULT - SUBJECTIVE AND OBJECTIVE BOX
Pt has been seen and examined with FP resident, resident supervised agree with a/p       Patient is a 83y old  Male who presents with a chief complaint of Left Knee pain, and fevers (09 Jul 2019 13:02)        PHYSICAL EXAM:  Vital Signs Last 24 Hrs  T(C): 36.8 (09 Jul 2019 12:04), Max: 37.8 (08 Jul 2019 21:10)  T(F): 98.3 (09 Jul 2019 12:04), Max: 100.1 (08 Jul 2019 21:10)  HR: 76 (09 Jul 2019 12:04) (76 - 95)  BP: 134/69 (09 Jul 2019 12:04) (130/64 - 174/77)  BP(mean): --  RR: 18 (09 Jul 2019 12:04) (18 - 18)  SpO2: 96% (09 Jul 2019 12:04) (94% - 97%)  general- comfortable   -rs-aeeb,cta  -cvs-s1s2 normal   -p/a-soft,bs+  -extremity- no asymmetrical swelling noted   -cns- non focal         A/P    #d/c today, time spent 40 minutes

## 2019-07-09 NOTE — DISCHARGE NOTE PROVIDER - PROVIDER TOKENS
PROVIDER:[TOKEN:[[id number]]],PROVIDER:[TOKEN:[[id number]]],PROVIDER:[TOKEN:[[id number]]],PROVIDER:[TOKEN:[[id number]]]

## 2019-07-09 NOTE — PROGRESS NOTE ADULT - SUBJECTIVE AND OBJECTIVE BOX
Pt S/E at bedside, no acute events overnight, pain controlled    Vital Signs Last 24 Hrs  T(C): 36.9 (09 Jul 2019 05:17), Max: 37.8 (08 Jul 2019 21:10)  T(F): 98.5 (09 Jul 2019 05:17), Max: 100.1 (08 Jul 2019 21:10)  HR: 77 (09 Jul 2019 05:17) (77 - 95)  BP: 130/64 (09 Jul 2019 05:17) (119/65 - 174/77)  BP(mean): --  RR: 18 (09 Jul 2019 05:17) (18 - 18)  SpO2: 95% (09 Jul 2019 05:17) (94% - 97%)    Gen: NAD    Left Lower Extremity:  Dressing clean dry intact  +EHL/FHL/TA/GS  SILT L3-S1  +DP/PT Pulses  Compartments soft  No calf TTP B/L    Spine:  Skin intact  +EHL/FHL/TA/GS  +AIN/PIN/M/R/U/Msc/Ax  SILT L3-S1  SILT C5-T1  +DP/PT Pulses  +Radial Pulse  Compartments soft

## 2019-07-09 NOTE — DISCHARGE NOTE PROVIDER - NSDCFUADDAPPT_GEN_ALL_CORE_FT
Please follow up with Primary Care Physician for continuity of care as well as Spinal specialist Dr. Birmingham and Orthopedist Dr. Mcdonald for further evaluation of osteoarthritis and medical management.

## 2019-07-09 NOTE — PROGRESS NOTE ADULT - ASSESSMENT
83M with acute on chronic L knee pain and T6/7 & T12/L1 osteodiscitis  -MRI C/T/L Spine demonstrating abnormalities in T6-7, T12-L1 suspicious for osteomyelitis and discitis per radiology  -Prior MRI results from previous infection from [facility] obtained, which demonstrated C6-7, T9/10 osteomyelitis/discitis in 2015  -Continue IV abx   -BCx NGTD  -Abx per ID   -Recommend CT guided biopsy of osteodiscitis for culture/antibiotics  -No surgical intervention at this time   - s/p L knee aspiration 7/5, GS neg, Crystals Neg, CC 3.8K, Asp Cx NGTD   - no orthopedic surgical intervention at this time for LLE  - Recommend broad spectrum IV Abx per ID  - FU BCx  - continues to have pain over proximal tibia, may consider MRI vs bone scan, will discuss with Dr. Hull  -Can follow up with Dr. Hull as outpatient when discharged from hospital - or with own orthopedist

## 2019-07-09 NOTE — PROGRESS NOTE ADULT - SUBJECTIVE AND OBJECTIVE BOX
Date of service: 07-09-19 @ 13:02    Pt seen and examined  Left leg pain is improved  Has no current back pain  Temps down    ROS: denies dizziness, no HA, no SOB or cough, no abdominal pain, no diarrhea or constipation; no dysuria, no rashes      MEDICATIONS  (STANDING):  amLODIPine   Tablet 7.5 milliGRAM(s) Oral daily  atorvastatin 20 milliGRAM(s) Oral at bedtime  cefTRIAXone Injectable. 2000 milliGRAM(s) IV Push every 24 hours  cholecalciferol 1000 Unit(s) Oral daily  losartan 100 milliGRAM(s) Oral daily  metoprolol tartrate 50 milliGRAM(s) Oral two times a day  multivitamin 1 Tablet(s) Oral daily  tamsulosin 0.4 milliGRAM(s) Oral at bedtime  vancomycin  IVPB 750 milliGRAM(s) IV Intermittent every 12 hours    Vital Signs Last 24 Hrs  T(C): 36.8 (09 Jul 2019 12:04), Max: 37.8 (08 Jul 2019 21:10)  T(F): 98.3 (09 Jul 2019 12:04), Max: 100.1 (08 Jul 2019 21:10)  HR: 76 (09 Jul 2019 12:04) (76 - 95)  BP: 134/69 (09 Jul 2019 12:04) (119/65 - 174/77)  BP(mean): --  RR: 18 (09 Jul 2019 12:04) (18 - 18)  SpO2: 96% (09 Jul 2019 12:04) (94% - 97%)      Physical Exam:  Constitutional: frail looking  HEENT: NC/AT, EOMI, PERRLA, conjunctivae clear  Neck: supple; thyroid not palpable  Back: no tenderness  Respiratory: respiratory effort normal; decreased BS at bases  Cardiovascular: S1S2 regular, no murmurs  Abdomen: soft, not tender, not distended, positive BS  Genitourinary: no suprapubic tenderness  Lymphatic: no LN palpable  Musculoskeletal: no muscle tenderness, no joint swelling or tenderness  Has left hip tenderness  Left knee mild edema with effusion; no erythema  Extremities: 1+ pedal edema  Neurological/ Psychiatric: AxOx3, moving all extremities  Skin: no rashes; no palpable lesions    Labs: reviewed                                           10.3   7.45  )-----------( 183      ( 08 Jul 2019 08:25 )             31.8     07-08    136  |  100  |  16  ----------------------------<  119<H>  3.9   |  30  |  1.12    Ca    8.6      08 Jul 2019 08:25             Vancomycin Level, Trough: 12.8 ug/mL (07-07 @ 05:04)      Cultures:     Cell Count, Body Fluid (07.05.19 @ 18:30)    Monocyte/Macrophage Count - Body Fluid: 20 %    Fluid Segmented Granulocytes: 57 %    Fluid Type: Synovial fluid    Body Fluid Appearance: Slightly Cloudy    BF Lymphocytes: 23 %    Tube Type: Lavender    Color - Body Fluid: Yellow    Total Nucleated Cell Count, Body Fluid: 3880: Includes WBC and other nucleated cells if present. /uL    Total RBC Count: 4200 /uL    Crystals, Synovial Fluid (07.05.19 @ 18:30)    Synovial Crystals Clarity: Slightly Turbid    Synovial Crystals Tube: Sterile Screw Top    Synovial Crystals Color: Yellow    Synovial Crystals Id: None Seen    Culture - Blood (07.04.19 @ 20:11)    Specimen Source: .Blood Blood-Peripheral    Culture Results:   No growth to date.        Radiology: all available radiological tests reviewed    < from: MR Thoracic Spine w/wo IV Cont (07.05.19 @ 04:17) >  1. Multilevel degenerative disc disease and spondylosis primarily at T6-7   through T11-12.  2. No significant cord impingement is seen.   3. Minimal anterolisthesis T2-T3 on a degenerative basis.   4.  Abnormal enhancement and edema seen involving the inferior endplate   of T6 and superior endplate of T7 with minimal enhancement of the T6-7   intervertebral discs. These findings are suspicious with osteomyelitis   and discitis.    < end of copied text >    < from: MR Lumbar Spine w/wo IV Cont (07.05.19 @ 04:17) >  1. Disc herniation at T12-L1. edema at the inferior endplate of T12 and   anterior endplate of L1 with erosion and enhancement suspicious for   osteomyelitis and discitis.  2. Multilevel degenerative disc disease and spondylosis with severe   central stenosis at L3-4 and L4-5 and moderate central stenosis at L2-3   on a degenerative basis    < end of copied text >    < from: MR Cervical Spine w/wo IV Cont (07.05.19 @ 04:13) >  1. Degenerative marked reversal of the cervical lordosis.   2. Multilevel degenerative disc disease and spondylosis with narrowing of   the BILATERAL C3-4 through C7-T1 neural foramina and marked degenerative   cord impingement at C3-4 through C6-7.   3. There is abnormal increased signal of the cervical cord particularly   from   C3-C6-C7 likely cord edema/gliosis due to chronic cord compression.   4. No acute bony fracture.    < end of copied text >      Advanced directives addressed: full resuscitation

## 2019-07-09 NOTE — DISCHARGE NOTE NURSING/CASE MANAGEMENT/SOCIAL WORK - NSSCNAMETXT_GEN_ALL_CORE
[facility] ([facility])  [phone]   &   [facility]  [phone]

## 2019-07-09 NOTE — DISCHARGE NOTE NURSING/CASE MANAGEMENT/SOCIAL WORK - NSDCDPATPORTLINK_GEN_ALL_CORE
You can access the [facility] Patient Portal, offered by [facility], by registering with the following website: [email or web address]

## 2019-07-09 NOTE — DISCHARGE NOTE NURSING/CASE MANAGEMENT/SOCIAL WORK - NSDPDISTO_GEN_ALL_CORE
Home with home care/[facility] ([facility])  [phone]   &   [facility]  [phone]

## 2019-07-09 NOTE — PROGRESS NOTE ADULT - REASON FOR ADMISSION
Left Knee pain, and fevers
T12-L1 suspicious for osteomyelitis and discitis
Left Knee pain, and fevers

## 2019-07-09 NOTE — PROGRESS NOTE ADULT - ASSESSMENT
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 The patient states that he has had left knee > hip pain over the last several days but has since progressively worsened preventing him from walking. At baseline the patient is a community ambulator with the assistance of a cane. In ER he was febrile Tmax 102.2'F. In the ED the patient received Piperacillin/tazobactam 3.375g IVPB, Vancomycin 1g IVPB x 1.    1. Febrile syndrome. Possible sepsis. T6-7 probable discitis/OM. DDD. Left hip pain ?underlying OA ?infection. Left knee effusion s/p arthrocentesis.  - doubt left knee septic arthritis  - blood cx no growth, L knee fluid analysis reviewed, cx NGTD  - ortho evaluation appreciated: conservative management at this time  - on vancomycin [id number] and ceftriaxone 2 gm IV qd # 5  - tolerating abx well so far; no side effects noted  - vancomycin trough level is therapeutic  - continue abx coverage  - plan for PICC line and 6 wk long term abx coverage until 8/15 with above regimen along with spinal surgery f/u  - monitor temps  - f/u CBC  - supportive care  2. 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 The patient states that he has had left knee > hip pain over the last several days but has since progressively worsened preventing him from walking. At baseline the patient is a community ambulator with the assistance of a cane. In ER he was febrile Tmax 102.2'F. In the ED the patient received Piperacillin/tazobactam 3.375g IVPB, Vancomycin 1g IVPB x 1.    1. Febrile syndrome. Possible sepsis. T6-7 probable discitis/OM. DDD. Left hip pain ?underlying OA ?infection. Left knee effusion s/p arthrocentesis.  - doubt left knee septic arthritis  - blood cx no growth, L knee fluid analysis reviewed, cx NGTD  - ortho evaluation appreciated: conservative management at this time  - on vancomycin [id number] and ceftriaxone 2 gm IV qd # 5  - tolerating abx well so far; no side effects noted  - vancomycin trough level is therapeutic  - continue abx coverage  - plan for PICC line and 6 wk long term abx coverage until 8/15 with above regimen along with spinal surgery f/u  - monitor temps  - f/u CBC  - supportive care  2. Other issues:   -care per medicine

## 2019-07-09 NOTE — DISCHARGE NOTE PROVIDER - NSDCCPCAREPLAN_GEN_ALL_CORE_FT
PRINCIPAL DISCHARGE DIAGNOSIS  Diagnosis: Septic discitis of thoracic region  Assessment and Plan of Treatment: - Confirmed with imaging, T6-T7 & T12-L1 disc spaces with infected discitis. Patient to be placed on IV antibiotics for 6 week course through PICC line. Follow up with orthopedic medicine outpatient for continuity of care and management as well as spine surgery evaluation. Follow up with Dr. Birmingham orthopedics outpatient within week of discharge.      SECONDARY DISCHARGE DIAGNOSES  Diagnosis: Osteoarthritis of left knee  Assessment and Plan of Treatment: Continue to take pain medications as needed. Please follow up with orthopedics outpatient for further management, and evaluation for injections vs. surgical options and physical therapy, in addition to Home Physical therapy after discharge.    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  INR 2.1 on discharge.      Diagnosis: HTN (hypertension)  Assessment and Plan of Treatment: Continue home medications as prescribed.

## 2019-07-09 NOTE — DISCHARGE NOTE PROVIDER - CARE PROVIDER_API CALL
Greyson Hopkins)  [facility]  [address] [place] [address]  Phone: [phone]  Fax: [phone]  Follow Up Time:     Gustavo Birmingham)  Orthopaedic Surgery  [address] [place] [address]  Phone: [phone]  Fax: [phone]  Follow Up Time:     Shawn Mcdonald)  Orthopaedic Surgery  [address], [address]  [place] [address]  Phone: [phone]  Fax: [phone]  Follow Up Time:     Judy Ca (DO)  Internal Medicine  [address], [address], [place] [address]  Phone: [phone]  Fax: [phone]  Follow Up Time:

## 2019-07-09 NOTE — CHART NOTE - NSCHARTNOTEFT_GEN_A_CORE
DARRIAN IR and Dr. Ca (ID).   Difficult location for CT guided bx, low yield of adequate bx in conjunction with previous iv abx use.   Dr. Ca aware.   Will continue broad spectrum abx via PICC x min 6w.   Plan to follow up with Dr. Birmingham within 1 week.   FU ID  Monitor for any neurological change - if so, need emergent eval.

## 2019-07-09 NOTE — ADVANCED PRACTICE NURSE CONSULT - ASSESSMENT
Alert and oriented, chart reviewed prior to insertion, consent on chart, risk and benefits explained and pt agreed to the procedure, pt in semi-fowlers position, a Navilyst Xcela Picc with PASV placed in the RUE in the brachial  vein with good blood return and flushes well with 30mL/NS, vein access using the sonosite US, scant blood loss, A 4fr cath with length 45cm placed,, procedure done using the MST with sterile and full barrier precaution, pt tolerated procedure well, pt resting quietly in his bed offering no complaints and no distress observed, LOT#[id number]

## 2019-07-09 NOTE — PROGRESS NOTE ADULT - PROVIDER SPECIALTY LIST ADULT
Family Medicine
Family Medicine
Hospitalist
Infectious Disease
Orthopedics
Family Medicine
Family Medicine

## 2019-07-09 NOTE — DISCHARGE NOTE PROVIDER - NSDCHHNEEDSERVICE_GEN_ALL_CORE
Medication teaching and assessment/Observation and assessment/Other, specify.../Rehabilitation services

## 2019-07-10 LAB
CULTURE RESULTS: SIGNIFICANT CHANGE UP
CULTURE RESULTS: SIGNIFICANT CHANGE UP
SPECIMEN SOURCE: SIGNIFICANT CHANGE UP
SPECIMEN SOURCE: SIGNIFICANT CHANGE UP

## 2019-07-12 DIAGNOSIS — M25.462 EFFUSION, LEFT KNEE: ICD-10-CM

## 2019-07-12 DIAGNOSIS — M46.20 OSTEOMYELITIS OF VERTEBRA, SITE UNSPECIFIED: ICD-10-CM

## 2019-07-12 DIAGNOSIS — M17.11 UNILATERAL PRIMARY OSTEOARTHRITIS, RIGHT KNEE: ICD-10-CM

## 2019-07-12 DIAGNOSIS — I10 ESSENTIAL (PRIMARY) HYPERTENSION: ICD-10-CM

## 2019-07-12 DIAGNOSIS — A41.9 SEPSIS, UNSPECIFIED ORGANISM: ICD-10-CM

## 2019-07-12 DIAGNOSIS — E78.5 HYPERLIPIDEMIA, UNSPECIFIED: ICD-10-CM

## 2019-07-12 DIAGNOSIS — M46.44 DISCITIS, UNSPECIFIED, THORACIC REGION: ICD-10-CM

## 2019-07-12 DIAGNOSIS — Z79.01 LONG TERM (CURRENT) USE OF ANTICOAGULANTS: ICD-10-CM

## 2019-07-12 DIAGNOSIS — M46.45 DISCITIS, UNSPECIFIED, THORACOLUMBAR REGION: ICD-10-CM

## 2019-07-12 DIAGNOSIS — M47.814 SPONDYLOSIS WITHOUT MYELOPATHY OR RADICULOPATHY, THORACIC REGION: ICD-10-CM

## 2019-07-12 DIAGNOSIS — I82.502 CHRONIC EMBOLISM AND THROMBOSIS OF UNSPECIFIED DEEP VEINS OF LEFT LOWER EXTREMITY: ICD-10-CM

## 2019-07-12 DIAGNOSIS — E44.0 MODERATE PROTEIN-CALORIE MALNUTRITION: ICD-10-CM

## 2019-07-12 DIAGNOSIS — N40.0 BENIGN PROSTATIC HYPERPLASIA WITHOUT LOWER URINARY TRACT SYMPTOMS: ICD-10-CM

## 2019-07-19 LAB
CULTURE RESULTS: SIGNIFICANT CHANGE UP
SPECIMEN SOURCE: SIGNIFICANT CHANGE UP

## 2019-11-06 NOTE — CHART NOTE - NSCHARTNOTEFT_GEN_A_CORE
Upon Nutritional Assessment by the Registered Dietitian your patient was determined to meet criteria / has evidence of the following diagnosis/diagnoses:          [ ]  Mild Protein Calorie Malnutrition        [x ]  Moderate Protein Calorie Malnutrition        [ ] Severe Protein Calorie Malnutrition        [ ] Unspecified Protein Calorie Malnutrition        [ ] Underweight / BMI <19        [ ] Morbid Obesity / BMI > 40      Findings:    Pt was found w/ sepsis w/ unknown etiology. Upon visit pt OOB sitting in chair w/ wife at bedside. Pt c/o poor appetite and decreased intake PTA x 3 days r/t overall not feeling well he associates w/ fever. Pt reports since admission his appetite has improved somewhat however pt has a difficult time finding foods he likes at . Pt noted w/ % intake x 2 meals during admission. Pt reports  which is consistent w/ admission wt. Pt noted w/ +1 edema r/l ankles. NFPE performed and significant for severe muscle wasting: calvicle region; moderate: deltoid; fat depletion: moderate: triceps. Based on above pt meets criteria for moderate malnutrition in the context of social/environmental circumstances AEB moderate/severe muscle wasting, moderate fat depletion, mild edema. D/w pt and recommend adding ensure enlive 1x/day to supplement PO intake. Pt w/ hx of HTN and on warfarin. Reviewed heart healthy diet edu and vitamin K and medication interaction diet edu. Pt was receptive to information. Provided written instruction w/ contact information. No c/o chewing/sawllowing difficulties. No current c/o n/v/c/d; c/o constipation on admission however reports resolved. Dawit 18.    Findings as based on:  •  Comprehensive nutrition assessment and consultation  •  Calorie counts (nutrient intake analysis)  •  Food acceptance and intake status from observations by staff  •  Follow up  •  Patient education  •  Intervention secondary to interdisciplinary rounds  •   concerns      Treatment:      1. c/w current diet rx.   2. add ensure enlive 1x/day   3. add MVI w/ minerals   4. encourage intake at each meal and nutrition supplement between meals.   5. reinforce diet edu PRN.      The following diet has been recommended:      PROVIDER Section:     By signing this assessment you are acknowledging and agree with the diagnosis/diagnoses assigned by the Registered Dietitian    Comments: English

## 2019-11-14 NOTE — ED ADULT TRIAGE NOTE - WEIGHT IN LBS
1930 Bedside and Verbal shift change report given to JOSSELIN Espinoza (oncoming nurse) by Ellie Webber RN (offgoing nurse). Report included the following information SBAR, Kardex, Intake/Output, MAR, Recent Results and Cardiac Rhythm Sinus Tach. Assessed site to the right groin. Dressing is dry, clean and intact. No signs of bleeding or hematoma. Pedal pulses present. Introduced self as primary RN. Initial assessment completed. VSS. Pt denies additional complaints at this time. Plan for the evening discussed with pt and she verbalized understanding. Bed locked and in low position with call bell within reach. Instructed pt to press call klein when needed. White board updated with this RN's name. 2100 No acute distress noted. PRN tylenol was administered for complaints of discomfort/pain 4/10. She stated she wanted to try the tylenol first. Dressing continues clean, dry and intact. No bleeding or hematoma. Call bell within reach. 2330 Pt resting quietly in bed with eyes closed, respirations even and unlabored. No acute distress noted. Call bell within reach. 0200 No acute distress noted. Resting in the bed with eyes closed. Call bell within reach. 0440 Pt in irregular HR, denies chest pain or discomfort. EKG completed and results communicated to on call Family Practice. Blood glucose 431, POC blood sugar check was 399. She is asymptomatic. On call Family practice was notified. New order to give Lispro 6 units now and recheck blood sugar at 0730.  
 
0640 Pt rates pain 10/10 to the groin site. Area continues with no signs of bleeding, no hematoma, dressing is clean, dry and intact. On call Family practice was notified. Ordered to attempt PRN tylenol and ice pack to area. 0800 Bedside and Verbal shift change report given to Wild Pisano RN (oncoming nurse) by Emely Carrasco (offgoing nurse).  Report included the following information SBAR, Kardex, Intake/Output, MAR, Med Rec Status and Cardiac Rhythm 2nd degree AVB. 184.9

## 2020-05-18 NOTE — PHYSICAL THERAPY INITIAL EVALUATION ADULT - MD/RN NOTIFIED
Attempted to contact pt for covid-19 results. Voicemail not currently set up. Unable to leave message. Routing to  RN pool.    yes

## 2020-06-13 PROBLEM — M19.90 UNSPECIFIED OSTEOARTHRITIS, UNSPECIFIED SITE: Chronic | Status: ACTIVE | Noted: 2019-07-04

## 2020-06-26 ENCOUNTER — OUTPATIENT (OUTPATIENT)
Dept: OUTPATIENT SERVICES | Facility: HOSPITAL | Age: 85
LOS: 1 days | Discharge: HOME | End: 2020-06-26
Payer: MEDICARE

## 2020-06-26 VITALS
WEIGHT: 182.1 LBS | TEMPERATURE: 98 F | HEIGHT: 68 IN | RESPIRATION RATE: 18 BRPM | OXYGEN SATURATION: 97 % | SYSTOLIC BLOOD PRESSURE: 168 MMHG | DIASTOLIC BLOOD PRESSURE: 90 MMHG | HEART RATE: 74 BPM

## 2020-06-26 DIAGNOSIS — Z95.828 PRESENCE OF OTHER VASCULAR IMPLANTS AND GRAFTS: Chronic | ICD-10-CM

## 2020-06-26 DIAGNOSIS — Z98.890 OTHER SPECIFIED POSTPROCEDURAL STATES: Chronic | ICD-10-CM

## 2020-06-26 DIAGNOSIS — T84.7XXA INFECTION AND INFLAMMATORY REACTION DUE TO OTHER INTERNAL ORTHOPEDIC PROSTHETIC DEVICES, IMPLANTS AND GRAFTS, INITIAL ENCOUNTER: ICD-10-CM

## 2020-06-26 DIAGNOSIS — I80.229 PHLEBITIS AND THROMBOPHLEBITIS OF UNSPECIFIED POPLITEAL VEIN: ICD-10-CM

## 2020-06-26 DIAGNOSIS — Z01.818 ENCOUNTER FOR OTHER PREPROCEDURAL EXAMINATION: ICD-10-CM

## 2020-06-26 LAB
ANION GAP SERPL CALC-SCNC: 5 MMOL/L — SIGNIFICANT CHANGE UP (ref 5–17)
APTT BLD: 51.4 SEC — HIGH (ref 27.5–36.3)
BUN SERPL-MCNC: 26 MG/DL — HIGH (ref 7–23)
CALCIUM SERPL-MCNC: 9.2 MG/DL — SIGNIFICANT CHANGE UP (ref 8.5–10.1)
CHLORIDE SERPL-SCNC: 107 MMOL/L — SIGNIFICANT CHANGE UP (ref 96–108)
CO2 SERPL-SCNC: 29 MMOL/L — SIGNIFICANT CHANGE UP (ref 22–31)
CREAT SERPL-MCNC: 0.98 MG/DL — SIGNIFICANT CHANGE UP (ref 0.5–1.3)
GLUCOSE SERPL-MCNC: 126 MG/DL — HIGH (ref 70–99)
HCT VFR BLD CALC: 41.7 % — SIGNIFICANT CHANGE UP (ref 39–50)
HGB BLD-MCNC: 13.9 G/DL — SIGNIFICANT CHANGE UP (ref 13–17)
INR BLD: 2.74 RATIO — HIGH (ref 0.88–1.16)
MCHC RBC-ENTMCNC: 29.5 PG — SIGNIFICANT CHANGE UP (ref 27–34)
MCHC RBC-ENTMCNC: 33.3 GM/DL — SIGNIFICANT CHANGE UP (ref 32–36)
MCV RBC AUTO: 88.5 FL — SIGNIFICANT CHANGE UP (ref 80–100)
NRBC # BLD: 0 /100 WBCS — SIGNIFICANT CHANGE UP (ref 0–0)
PLATELET # BLD AUTO: 192 K/UL — SIGNIFICANT CHANGE UP (ref 150–400)
POTASSIUM SERPL-MCNC: 3.6 MMOL/L — SIGNIFICANT CHANGE UP (ref 3.5–5.3)
POTASSIUM SERPL-SCNC: 3.6 MMOL/L — SIGNIFICANT CHANGE UP (ref 3.5–5.3)
PROTHROM AB SERPL-ACNC: 31.4 SEC — HIGH (ref 10–12.9)
RBC # BLD: 4.71 M/UL — SIGNIFICANT CHANGE UP (ref 4.2–5.8)
RBC # FLD: 13.7 % — SIGNIFICANT CHANGE UP (ref 10.3–14.5)
SODIUM SERPL-SCNC: 141 MMOL/L — SIGNIFICANT CHANGE UP (ref 135–145)
WBC # BLD: 5.83 K/UL — SIGNIFICANT CHANGE UP (ref 3.8–10.5)
WBC # FLD AUTO: 5.83 K/UL — SIGNIFICANT CHANGE UP (ref 3.8–10.5)

## 2020-06-26 PROCEDURE — 93010 ELECTROCARDIOGRAM REPORT: CPT

## 2020-06-26 RX ORDER — WARFARIN SODIUM 2.5 MG/1
1 TABLET ORAL
Qty: 0 | Refills: 0 | DISCHARGE

## 2020-06-26 RX ORDER — AMLODIPINE BESYLATE 2.5 MG/1
1 TABLET ORAL
Qty: 0 | Refills: 0 | DISCHARGE

## 2020-06-26 NOTE — H&P PST ADULT - NSICDXPASTMEDICALHX_GEN_ALL_CORE_FT
PAST MEDICAL HISTORY:  Arthritis     DVT (deep venous thrombosis) IVC filter and coumadin    HTN (hypertension)     Monoclonal gammopathy
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
## 2020-06-26 NOTE — H&P PST ADULT - NSICDXPROBLEM_GEN_ALL_CORE_FT
PROBLEM DIAGNOSES  Problem: Infection and inflammatory reaction due to other internal orthopedic prosthetic devices, implants and grafts, initial encounter  Assessment and Plan: Left femoral nail removal on 7/8/2020    Problem: Thrombophlebitis of popliteal vein  Assessment and Plan: H/O on coumadin and has IVC filter

## 2020-06-26 NOTE — H&P PST ADULT - MUSCULOSKELETAL
details… detailed exam decreased ROM/decreased ROM due to pain/baseball size mass underneath left knee
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
## 2020-06-26 NOTE — H&P PST ADULT - NSANTHOSAYNRD_GEN_A_CORE
No. ADDY screening performed.  STOP BANG Legend: 0-2 = LOW Risk; 3-4 = INTERMEDIATE Risk; 5-8 = HIGH Risk

## 2020-06-29 PROBLEM — I82.409 ACUTE EMBOLISM AND THROMBOSIS OF UNSPECIFIED DEEP VEINS OF UNSPECIFIED LOWER EXTREMITY: Chronic | Status: ACTIVE | Noted: 2019-07-04

## 2020-06-29 PROBLEM — D47.2 MONOCLONAL GAMMOPATHY: Chronic | Status: ACTIVE | Noted: 2020-06-26

## 2020-06-30 ENCOUNTER — NON-APPOINTMENT (OUTPATIENT)
Age: 85
End: 2020-06-30

## 2020-06-30 ENCOUNTER — APPOINTMENT (OUTPATIENT)
Dept: CARDIOLOGY | Facility: CLINIC | Age: 85
End: 2020-06-30
Payer: MEDICARE

## 2020-06-30 VITALS
BODY MASS INDEX: 27.28 KG/M2 | TEMPERATURE: 98.7 F | HEIGHT: 68 IN | DIASTOLIC BLOOD PRESSURE: 78 MMHG | SYSTOLIC BLOOD PRESSURE: 130 MMHG | RESPIRATION RATE: 14 BRPM | WEIGHT: 180 LBS | HEART RATE: 88 BPM

## 2020-06-30 DIAGNOSIS — Z78.9 OTHER SPECIFIED HEALTH STATUS: ICD-10-CM

## 2020-06-30 DIAGNOSIS — N40.0 BENIGN PROSTATIC HYPERPLASIA WITHOUT LOWER URINARY TRACT SYMPMS: ICD-10-CM

## 2020-06-30 DIAGNOSIS — Z87.39 PERSONAL HISTORY OF OTHER DISEASES OF THE MUSCULOSKELETAL SYSTEM AND CONNECTIVE TISSUE: ICD-10-CM

## 2020-06-30 PROCEDURE — 93000 ELECTROCARDIOGRAM COMPLETE: CPT | Mod: NC

## 2020-06-30 PROCEDURE — 99204 OFFICE O/P NEW MOD 45 MIN: CPT

## 2020-07-01 ENCOUNTER — APPOINTMENT (OUTPATIENT)
Dept: CARDIOLOGY | Facility: CLINIC | Age: 85
End: 2020-07-01
Payer: MEDICARE

## 2020-07-01 PROCEDURE — 78452 HT MUSCLE IMAGE SPECT MULT: CPT

## 2020-07-01 PROCEDURE — A9500: CPT

## 2020-07-01 PROCEDURE — 93015 CV STRESS TEST SUPVJ I&R: CPT

## 2020-07-01 RX ORDER — REGADENOSON 0.08 MG/ML
0.4 INJECTION, SOLUTION INTRAVENOUS
Qty: 4 | Refills: 0 | Status: COMPLETED | OUTPATIENT
Start: 2020-07-01

## 2020-07-01 RX ORDER — AMINOPHYLLINE 25 MG/ML
25 INJECTION, SOLUTION INTRAVENOUS
Qty: 0 | Refills: 0 | Status: COMPLETED | OUTPATIENT
Start: 2020-07-01

## 2020-07-01 RX ADMIN — REGADENOSON 0 MG/5ML: 0.08 INJECTION, SOLUTION INTRAVENOUS at 00:00

## 2020-07-01 RX ADMIN — AMINOPHYLLINE 0 MG/ML: 25 INJECTION, SOLUTION INTRAVENOUS at 00:00

## 2020-07-06 LAB — SARS-COV-2 RNA SPEC QL NAA+PROBE: SIGNIFICANT CHANGE UP

## 2020-07-07 ENCOUNTER — TRANSCRIPTION ENCOUNTER (OUTPATIENT)
Age: 85
End: 2020-07-07

## 2020-07-07 NOTE — ADDENDUM
[FreeTextEntry1] : Nuclear stress test performed July 1, 2020 was a pharmacologic study which was tolerated well. Blood pressure was elevated at baseline with normal response to infusion. EKG was nondiagnostic for ischemia due to left bundle branch block pattern. However a more important for the nuclear images which showed no evidence of ischemia or infarct an ejection fraction of 64%. Mild LVH is noted.\par \par Given the findings of the stress test showing no evidence of ischemia or infarct and a normal ejection fraction there is no cardiac contraindication to proceeding with surgery.  Monitor through the procedure until stable post procedurally. No additional cardiac testing is needed prior to surgery.\par

## 2020-07-07 NOTE — HISTORY OF PRESENT ILLNESS
[FreeTextEntry1] : Patient presents today for evaluation prior to having surgery to have it removed from his leg. The navin was placed approximately 11 years ago and has had 3 infections in the recent past surrounding the navin. He now needs to be removed. He states one of these infections may have affected his heart but he has not followed up with cardiology in some time. Symptomatically he reports some shortness of breath going up stairs but this is not something that is changed much recently. He reports no other exertional symptoms. He does not have any other physical complaints at this time. Patient denies chest pain, palpitations, orthopnea, presyncope, syncope.

## 2020-07-07 NOTE — ASSESSMENT
[FreeTextEntry1] : EKG: Sinus rhythm with intraventricular conduction delay, possibly atypical left bundle branch block.\par \par 84-year-old man with a past medical history of hypertension, dyslipidemia, "leaky heart valve" who presents to me for evaluation prior to undergoing surgery to remove the navin from his leg. Patient does report some dyspnea on exertion going up stairs but this has not changed recently and certainly this may be related to his age and some deconditioning over the last few months. His EKG shows a possible left bundle-branch block which may be new and ultimately I would recommend stress testing before he has his surgery. If stress testing shows no ischemia or infarct and a normal ejection fraction it would be reasonable to proceed. His murmur is consistent with aortic stenosis but does not sound to be severe. His blood pressure it seems to be controlled.

## 2020-07-08 ENCOUNTER — INPATIENT (INPATIENT)
Facility: HOSPITAL | Age: 85
LOS: 4 days | Discharge: HOME HEALTH | End: 2020-07-13
Attending: INTERNAL MEDICINE | Admitting: INTERNAL MEDICINE
Payer: MEDICARE

## 2020-07-08 ENCOUNTER — RESULT REVIEW (OUTPATIENT)
Age: 85
End: 2020-07-08

## 2020-07-08 VITALS
DIASTOLIC BLOOD PRESSURE: 93 MMHG | WEIGHT: 181 LBS | SYSTOLIC BLOOD PRESSURE: 164 MMHG | HEIGHT: 68 IN | TEMPERATURE: 99 F | HEART RATE: 87 BPM | OXYGEN SATURATION: 96 % | RESPIRATION RATE: 17 BRPM

## 2020-07-08 DIAGNOSIS — Z98.890 OTHER SPECIFIED POSTPROCEDURAL STATES: Chronic | ICD-10-CM

## 2020-07-08 DIAGNOSIS — Z95.828 PRESENCE OF OTHER VASCULAR IMPLANTS AND GRAFTS: Chronic | ICD-10-CM

## 2020-07-08 LAB
ANION GAP SERPL CALC-SCNC: 8 MMOL/L — SIGNIFICANT CHANGE UP (ref 5–17)
APTT BLD: 42.4 SEC — HIGH (ref 27.5–35.5)
BUN SERPL-MCNC: 16 MG/DL — SIGNIFICANT CHANGE UP (ref 7–23)
CALCIUM SERPL-MCNC: 8.5 MG/DL — SIGNIFICANT CHANGE UP (ref 8.5–10.1)
CHLORIDE SERPL-SCNC: 104 MMOL/L — SIGNIFICANT CHANGE UP (ref 96–108)
CK MB BLD-MCNC: 1.8 % — SIGNIFICANT CHANGE UP (ref 0–3.5)
CK MB CFR SERPL CALC: 1.6 NG/ML — SIGNIFICANT CHANGE UP (ref 0.5–3.6)
CK SERPL-CCNC: 87 U/L — SIGNIFICANT CHANGE UP (ref 26–308)
CO2 SERPL-SCNC: 27 MMOL/L — SIGNIFICANT CHANGE UP (ref 22–31)
CREAT SERPL-MCNC: 0.96 MG/DL — SIGNIFICANT CHANGE UP (ref 0.5–1.3)
GLUCOSE BLDC GLUCOMTR-MCNC: 185 MG/DL — HIGH (ref 70–99)
GLUCOSE SERPL-MCNC: 149 MG/DL — HIGH (ref 70–99)
HCT VFR BLD CALC: 33.8 % — LOW (ref 39–50)
HGB BLD-MCNC: 11.7 G/DL — LOW (ref 13–17)
INR BLD: 1.12 RATIO — SIGNIFICANT CHANGE UP (ref 0.88–1.16)
INR BLD: 1.17 RATIO — HIGH (ref 0.88–1.16)
MAGNESIUM SERPL-MCNC: 2 MG/DL — SIGNIFICANT CHANGE UP (ref 1.6–2.6)
MCHC RBC-ENTMCNC: 30.5 PG — SIGNIFICANT CHANGE UP (ref 27–34)
MCHC RBC-ENTMCNC: 34.6 GM/DL — SIGNIFICANT CHANGE UP (ref 32–36)
MCV RBC AUTO: 88 FL — SIGNIFICANT CHANGE UP (ref 80–100)
NRBC # BLD: 0 /100 WBCS — SIGNIFICANT CHANGE UP (ref 0–0)
PLATELET # BLD AUTO: 213 K/UL — SIGNIFICANT CHANGE UP (ref 150–400)
POTASSIUM SERPL-MCNC: 4.3 MMOL/L — SIGNIFICANT CHANGE UP (ref 3.5–5.3)
POTASSIUM SERPL-SCNC: 4.3 MMOL/L — SIGNIFICANT CHANGE UP (ref 3.5–5.3)
PROTHROM AB SERPL-ACNC: 12.4 SEC — SIGNIFICANT CHANGE UP (ref 10.6–13.6)
PROTHROM AB SERPL-ACNC: 12.9 SEC — SIGNIFICANT CHANGE UP (ref 10.6–13.6)
RBC # BLD: 3.84 M/UL — LOW (ref 4.2–5.8)
RBC # FLD: 14.1 % — SIGNIFICANT CHANGE UP (ref 10.3–14.5)
SODIUM SERPL-SCNC: 139 MMOL/L — SIGNIFICANT CHANGE UP (ref 135–145)
TROPONIN I SERPL-MCNC: <.015 NG/ML — SIGNIFICANT CHANGE UP (ref 0.01–0.04)
WBC # BLD: 8.51 K/UL — SIGNIFICANT CHANGE UP (ref 3.8–10.5)
WBC # FLD AUTO: 8.51 K/UL — SIGNIFICANT CHANGE UP (ref 3.8–10.5)

## 2020-07-08 PROCEDURE — 99291 CRITICAL CARE FIRST HOUR: CPT

## 2020-07-08 PROCEDURE — 88300 SURGICAL PATH GROSS: CPT | Mod: 26,59

## 2020-07-08 PROCEDURE — 93010 ELECTROCARDIOGRAM REPORT: CPT

## 2020-07-08 RX ORDER — AMIODARONE HYDROCHLORIDE 400 MG/1
150 TABLET ORAL ONCE
Refills: 0 | Status: COMPLETED | OUTPATIENT
Start: 2020-07-08 | End: 2020-07-08

## 2020-07-08 RX ORDER — HYDROMORPHONE HYDROCHLORIDE 2 MG/ML
1 INJECTION INTRAMUSCULAR; INTRAVENOUS; SUBCUTANEOUS
Refills: 0 | Status: DISCONTINUED | OUTPATIENT
Start: 2020-07-08 | End: 2020-07-08

## 2020-07-08 RX ORDER — ALBUMIN HUMAN 25 %
250 VIAL (ML) INTRAVENOUS ONCE
Refills: 0 | Status: COMPLETED | OUTPATIENT
Start: 2020-07-08 | End: 2020-07-08

## 2020-07-08 RX ORDER — ENOXAPARIN SODIUM 100 MG/ML
80 INJECTION SUBCUTANEOUS EVERY 12 HOURS
Refills: 0 | Status: DISCONTINUED | OUTPATIENT
Start: 2020-07-09 | End: 2020-07-13

## 2020-07-08 RX ORDER — HYDROMORPHONE HYDROCHLORIDE 2 MG/ML
0.5 INJECTION INTRAMUSCULAR; INTRAVENOUS; SUBCUTANEOUS
Refills: 0 | Status: DISCONTINUED | OUTPATIENT
Start: 2020-07-08 | End: 2020-07-08

## 2020-07-08 RX ORDER — ACETAMINOPHEN 500 MG
650 TABLET ORAL EVERY 6 HOURS
Refills: 0 | Status: DISCONTINUED | OUTPATIENT
Start: 2020-07-08 | End: 2020-07-13

## 2020-07-08 RX ORDER — ONDANSETRON 8 MG/1
4 TABLET, FILM COATED ORAL ONCE
Refills: 0 | Status: DISCONTINUED | OUTPATIENT
Start: 2020-07-08 | End: 2020-07-08

## 2020-07-08 RX ORDER — POLYETHYLENE GLYCOL 3350 17 G/17G
17 POWDER, FOR SOLUTION ORAL DAILY
Refills: 0 | Status: DISCONTINUED | OUTPATIENT
Start: 2020-07-08 | End: 2020-07-13

## 2020-07-08 RX ORDER — SODIUM CHLORIDE 9 MG/ML
1000 INJECTION, SOLUTION INTRAVENOUS
Refills: 0 | Status: DISCONTINUED | OUTPATIENT
Start: 2020-07-08 | End: 2020-07-08

## 2020-07-08 RX ORDER — CHLORHEXIDINE GLUCONATE 213 G/1000ML
1 SOLUTION TOPICAL
Refills: 0 | Status: DISCONTINUED | OUTPATIENT
Start: 2020-07-08 | End: 2020-07-10

## 2020-07-08 RX ORDER — PANTOPRAZOLE SODIUM 20 MG/1
40 TABLET, DELAYED RELEASE ORAL
Refills: 0 | Status: DISCONTINUED | OUTPATIENT
Start: 2020-07-08 | End: 2020-07-13

## 2020-07-08 RX ORDER — MAGNESIUM HYDROXIDE 400 MG/1
30 TABLET, CHEWABLE ORAL DAILY
Refills: 0 | Status: DISCONTINUED | OUTPATIENT
Start: 2020-07-08 | End: 2020-07-13

## 2020-07-08 RX ORDER — CEFAZOLIN SODIUM 1 G
2000 VIAL (EA) INJECTION EVERY 8 HOURS
Refills: 0 | Status: DISCONTINUED | OUTPATIENT
Start: 2020-07-09 | End: 2020-07-09

## 2020-07-08 RX ORDER — SODIUM CHLORIDE 9 MG/ML
3 INJECTION INTRAMUSCULAR; INTRAVENOUS; SUBCUTANEOUS EVERY 8 HOURS
Refills: 0 | Status: DISCONTINUED | OUTPATIENT
Start: 2020-07-08 | End: 2020-07-08

## 2020-07-08 RX ORDER — SENNA PLUS 8.6 MG/1
2 TABLET ORAL AT BEDTIME
Refills: 0 | Status: DISCONTINUED | OUTPATIENT
Start: 2020-07-08 | End: 2020-07-13

## 2020-07-08 RX ORDER — ASCORBIC ACID 60 MG
500 TABLET,CHEWABLE ORAL
Refills: 0 | Status: DISCONTINUED | OUTPATIENT
Start: 2020-07-08 | End: 2020-07-13

## 2020-07-08 RX ADMIN — HYDROMORPHONE HYDROCHLORIDE 1 MILLIGRAM(S): 2 INJECTION INTRAMUSCULAR; INTRAVENOUS; SUBCUTANEOUS at 18:05

## 2020-07-08 RX ADMIN — AMIODARONE HYDROCHLORIDE 618 MILLIGRAM(S): 400 TABLET ORAL at 22:48

## 2020-07-08 RX ADMIN — Medication 500 MILLILITER(S): at 19:09

## 2020-07-08 RX ADMIN — SODIUM CHLORIDE 75 MILLILITER(S): 9 INJECTION, SOLUTION INTRAVENOUS at 18:06

## 2020-07-08 NOTE — ASU PATIENT PROFILE, ADULT - PMH
Arthritis    DVT (deep venous thrombosis)  IVC filter and coumadin  HTN (hypertension)    Monoclonal gammopathy    Murmur
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
## 2020-07-08 NOTE — CONSULT NOTE ADULT - ATTENDING COMMENTS
83 yo M hx HTN, dvt s/p ivf and on a/c, gammopathy prior osteo now s/p IM nail removal for infected hardware. Postop in PACU pt with bigeminy and bradycardia and noted to have sx of LH. Pt did not have any complications in OR or bleeding. Pt had  range preop and now low 100s in PACU. Admitted to icu for monitoring. Pt reports cont LH but denies CP, SOB, abd pain. No surgical pain. Send troponin and ekg. EKG currently show frequent PVCs. Given frequent pvcs and bigeminy in pacu will give loading dose amiodarone.  Has a line which shows normal BP 120s SBP. F/u cardiology reccs. Will cover empirically w/ abx for sepsis related to his now removed hardware. f/u ortho reccs. Cont a/c.

## 2020-07-08 NOTE — CONSULT NOTE ADULT - ASSESSMENT
PHYSICAL EXAM:    GENERAL: NAD  HEAD:  Atraumatic, Normocephalic  EYES: EOMI, PERRLA, conjunctiva and sclera clear  NERVOUS SYSTEM:  Alert & Oriented X3  CHEST/LUNG: Clear to percussion bilaterally; No rales, rhonchi, wheezing, or rubs  HEART: 1 degree AV block with PVCs  ABDOMEN: Soft, Nontender, Nondistended  EXTREMITIES:  2+ Peripheral Pulses, No clubbing, cyanosis, or edema  LYMPH: No lymphadenopathy noted  SKIN: No rashes or lesions 84 year old male with a past medical history of hypertension, hyperlipidemia left leg dvt, s/p IM nail removal now in ICU for obs 2/2 bigeminy in PACU.    Plan:    Neuro:   -pain control    Pulm:   -maintain O2 sats >93% via NC    Cardiac:   -bedside echo  -f/u troponin  -monitor electrolytes  -maintain K+ >3.5, Mg >2  -amio load  -cardiology consult    ID:  -antibiotics    Continue lovenox for anticoagulation

## 2020-07-09 LAB
ANION GAP SERPL CALC-SCNC: 10 MMOL/L — SIGNIFICANT CHANGE UP (ref 5–17)
BUN SERPL-MCNC: 21 MG/DL — SIGNIFICANT CHANGE UP (ref 7–23)
CALCIUM SERPL-MCNC: 8.8 MG/DL — SIGNIFICANT CHANGE UP (ref 8.5–10.1)
CHLORIDE SERPL-SCNC: 104 MMOL/L — SIGNIFICANT CHANGE UP (ref 96–108)
CO2 SERPL-SCNC: 26 MMOL/L — SIGNIFICANT CHANGE UP (ref 22–31)
CREAT SERPL-MCNC: 1.08 MG/DL — SIGNIFICANT CHANGE UP (ref 0.5–1.3)
CRP SERPL-MCNC: 0.64 MG/DL — HIGH (ref 0–0.4)
ERYTHROCYTE [SEDIMENTATION RATE] IN BLOOD: 27 MM/HR — HIGH (ref 0–20)
GLUCOSE SERPL-MCNC: 145 MG/DL — HIGH (ref 70–99)
GRAM STN FLD: SIGNIFICANT CHANGE UP
HCT VFR BLD CALC: 34.8 % — LOW (ref 39–50)
HGB BLD-MCNC: 11.5 G/DL — LOW (ref 13–17)
INR BLD: 1.2 RATIO — HIGH (ref 0.88–1.16)
MAGNESIUM SERPL-MCNC: 2.2 MG/DL — SIGNIFICANT CHANGE UP (ref 1.6–2.6)
MCHC RBC-ENTMCNC: 30.3 PG — SIGNIFICANT CHANGE UP (ref 27–34)
MCHC RBC-ENTMCNC: 33 GM/DL — SIGNIFICANT CHANGE UP (ref 32–36)
MCV RBC AUTO: 91.6 FL — SIGNIFICANT CHANGE UP (ref 80–100)
NRBC # BLD: 0 /100 WBCS — SIGNIFICANT CHANGE UP (ref 0–0)
PHOSPHATE SERPL-MCNC: 3.2 MG/DL — SIGNIFICANT CHANGE UP (ref 2.5–4.5)
PLATELET # BLD AUTO: 177 K/UL — SIGNIFICANT CHANGE UP (ref 150–400)
POTASSIUM SERPL-MCNC: 3.7 MMOL/L — SIGNIFICANT CHANGE UP (ref 3.5–5.3)
POTASSIUM SERPL-SCNC: 3.7 MMOL/L — SIGNIFICANT CHANGE UP (ref 3.5–5.3)
PROTHROM AB SERPL-ACNC: 13.2 SEC — SIGNIFICANT CHANGE UP (ref 10.6–13.6)
RBC # BLD: 3.8 M/UL — LOW (ref 4.2–5.8)
RBC # FLD: 13.9 % — SIGNIFICANT CHANGE UP (ref 10.3–14.5)
SODIUM SERPL-SCNC: 140 MMOL/L — SIGNIFICANT CHANGE UP (ref 135–145)
SPECIMEN SOURCE: SIGNIFICANT CHANGE UP
TROPONIN I SERPL-MCNC: <.015 NG/ML — SIGNIFICANT CHANGE UP (ref 0.01–0.04)
WBC # BLD: 10.86 K/UL — HIGH (ref 3.8–10.5)
WBC # FLD AUTO: 10.86 K/UL — HIGH (ref 3.8–10.5)

## 2020-07-09 PROCEDURE — 99233 SBSQ HOSP IP/OBS HIGH 50: CPT

## 2020-07-09 PROCEDURE — 99223 1ST HOSP IP/OBS HIGH 75: CPT

## 2020-07-09 RX ORDER — TAMSULOSIN HYDROCHLORIDE 0.4 MG/1
0.4 CAPSULE ORAL AT BEDTIME
Refills: 0 | Status: DISCONTINUED | OUTPATIENT
Start: 2020-07-09 | End: 2020-07-13

## 2020-07-09 RX ORDER — WARFARIN SODIUM 2.5 MG/1
5 TABLET ORAL ONCE
Refills: 0 | Status: COMPLETED | OUTPATIENT
Start: 2020-07-09 | End: 2020-07-09

## 2020-07-09 RX ORDER — CEFTRIAXONE 500 MG/1
2000 INJECTION, POWDER, FOR SOLUTION INTRAMUSCULAR; INTRAVENOUS EVERY 24 HOURS
Refills: 0 | Status: DISCONTINUED | OUTPATIENT
Start: 2020-07-09 | End: 2020-07-11

## 2020-07-09 RX ORDER — ONDANSETRON 8 MG/1
4 TABLET, FILM COATED ORAL EVERY 6 HOURS
Refills: 0 | Status: DISCONTINUED | OUTPATIENT
Start: 2020-07-09 | End: 2020-07-13

## 2020-07-09 RX ORDER — METOPROLOL TARTRATE 50 MG
50 TABLET ORAL
Refills: 0 | Status: DISCONTINUED | OUTPATIENT
Start: 2020-07-09 | End: 2020-07-10

## 2020-07-09 RX ORDER — VANCOMYCIN HCL 1 G
1000 VIAL (EA) INTRAVENOUS EVERY 12 HOURS
Refills: 0 | Status: DISCONTINUED | OUTPATIENT
Start: 2020-07-09 | End: 2020-07-09

## 2020-07-09 RX ORDER — SODIUM CHLORIDE 9 MG/ML
1000 INJECTION, SOLUTION INTRAVENOUS
Refills: 0 | Status: DISCONTINUED | OUTPATIENT
Start: 2020-07-09 | End: 2020-07-13

## 2020-07-09 RX ORDER — LOSARTAN POTASSIUM 100 MG/1
100 TABLET, FILM COATED ORAL DAILY
Refills: 0 | Status: DISCONTINUED | OUTPATIENT
Start: 2020-07-09 | End: 2020-07-10

## 2020-07-09 RX ORDER — CELECOXIB 200 MG/1
200 CAPSULE ORAL
Refills: 0 | Status: DISCONTINUED | OUTPATIENT
Start: 2020-07-09 | End: 2020-07-13

## 2020-07-09 RX ORDER — ATORVASTATIN CALCIUM 80 MG/1
20 TABLET, FILM COATED ORAL AT BEDTIME
Refills: 0 | Status: DISCONTINUED | OUTPATIENT
Start: 2020-07-09 | End: 2020-07-13

## 2020-07-09 RX ORDER — OXYCODONE HYDROCHLORIDE 5 MG/1
5 TABLET ORAL EVERY 4 HOURS
Refills: 0 | Status: DISCONTINUED | OUTPATIENT
Start: 2020-07-09 | End: 2020-07-13

## 2020-07-09 RX ORDER — AMLODIPINE BESYLATE 2.5 MG/1
5 TABLET ORAL DAILY
Refills: 0 | Status: DISCONTINUED | OUTPATIENT
Start: 2020-07-09 | End: 2020-07-10

## 2020-07-09 RX ORDER — OXYCODONE HYDROCHLORIDE 5 MG/1
10 TABLET ORAL EVERY 4 HOURS
Refills: 0 | Status: DISCONTINUED | OUTPATIENT
Start: 2020-07-09 | End: 2020-07-13

## 2020-07-09 RX ORDER — VANCOMYCIN HCL 1 G
750 VIAL (EA) INTRAVENOUS EVERY 12 HOURS
Refills: 0 | Status: DISCONTINUED | OUTPATIENT
Start: 2020-07-09 | End: 2020-07-11

## 2020-07-09 RX ADMIN — CHLORHEXIDINE GLUCONATE 1 APPLICATION(S): 213 SOLUTION TOPICAL at 06:19

## 2020-07-09 RX ADMIN — Medication 500 MILLIGRAM(S): at 17:26

## 2020-07-09 RX ADMIN — Medication 1 TABLET(S): at 11:40

## 2020-07-09 RX ADMIN — ENOXAPARIN SODIUM 80 MILLIGRAM(S): 100 INJECTION SUBCUTANEOUS at 17:26

## 2020-07-09 RX ADMIN — Medication 50 MILLIGRAM(S): at 05:24

## 2020-07-09 RX ADMIN — CELECOXIB 200 MILLIGRAM(S): 200 CAPSULE ORAL at 17:26

## 2020-07-09 RX ADMIN — Medication 250 MILLIGRAM(S): at 17:25

## 2020-07-09 RX ADMIN — WARFARIN SODIUM 5 MILLIGRAM(S): 2.5 TABLET ORAL at 22:07

## 2020-07-09 RX ADMIN — ENOXAPARIN SODIUM 80 MILLIGRAM(S): 100 INJECTION SUBCUTANEOUS at 05:58

## 2020-07-09 RX ADMIN — CELECOXIB 200 MILLIGRAM(S): 200 CAPSULE ORAL at 05:23

## 2020-07-09 RX ADMIN — Medication 500 MILLIGRAM(S): at 05:22

## 2020-07-09 RX ADMIN — SENNA PLUS 2 TABLET(S): 8.6 TABLET ORAL at 22:08

## 2020-07-09 RX ADMIN — Medication 50 MILLIGRAM(S): at 17:26

## 2020-07-09 RX ADMIN — TAMSULOSIN HYDROCHLORIDE 0.4 MILLIGRAM(S): 0.4 CAPSULE ORAL at 22:08

## 2020-07-09 RX ADMIN — Medication 100 MILLIGRAM(S): at 00:52

## 2020-07-09 RX ADMIN — ATORVASTATIN CALCIUM 20 MILLIGRAM(S): 80 TABLET, FILM COATED ORAL at 22:07

## 2020-07-09 RX ADMIN — LOSARTAN POTASSIUM 100 MILLIGRAM(S): 100 TABLET, FILM COATED ORAL at 05:23

## 2020-07-09 RX ADMIN — Medication 100 MILLIGRAM(S): at 08:01

## 2020-07-09 RX ADMIN — AMLODIPINE BESYLATE 5 MILLIGRAM(S): 2.5 TABLET ORAL at 05:23

## 2020-07-09 RX ADMIN — SODIUM CHLORIDE 75 MILLILITER(S): 9 INJECTION, SOLUTION INTRAVENOUS at 08:01

## 2020-07-09 RX ADMIN — PANTOPRAZOLE SODIUM 40 MILLIGRAM(S): 20 TABLET, DELAYED RELEASE ORAL at 06:19
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
## 2020-07-09 NOTE — CONSULT NOTE ADULT - ASSESSMENT
S/P removal of hardware, purulent material encountered.  Cx pending  Prior cx unrevealing from HH  No concern of uncontrolled infection on exam  We will have to see whether, once again, we are relegated to empiric therapy.   Mumhanny is old, patient denies any fevers PTA, low suspicion for endocarditis and duration of therapy would likely supersede it regardless, Last ECHO in system revealed VHD with MAC and AS but it is from 2009 (around the time of the O[facility], prior to diagnosis of endocarditis (~2015)),     Suggestions--  F/U Cx  Postop care per orthopedics  Cardiac issues per CCM/Cardiology  Stop cefazolin  Vancomycin (would suspect CNS>MRSA) 1q12 and Ceftriaxone 2g/day  Check blood cx, low yield  Likely will need IV Abx but TBD based on culture results.    Thank you for the courtesy of this referral.  Quentin العراقي MD  Attending Physician  [facility]  Division of Infectious Diseases  [phone]

## 2020-07-09 NOTE — DIETITIAN INITIAL EVALUATION ADULT. - OTHER INFO
Pt seen in ICU. He lives at home w/ his wife. They both go food shopping together and he does the cooking. Denies N/V/C/D/Weight loss/Chewing/Swallowing difficulty/Allergies. He takes coumadin and verbalizes importance of consistent intake. Provided w/ written literature for vit k and medications. Pt appears WDWN.
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
## 2020-07-09 NOTE — PROGRESS NOTE ADULT - ASSESSMENT
84M PMH osteoarthritis, left lower extremity DVT on warfarin, s/p IVC filter placement complicated by migration to right ventricle s/p open cardiotomy, HTN, BPH, vertebral osteomyelitis, monoclonal gammopathy presents for removal of infected left intramedullary tibial nail on 7/8. Post up pt with bigeminy c/o dizziness with noted hypotension and episodes of bradycardia. Admitted to ICU for post op observation and monitoring. 84M PMH osteoarthritis, left lower extremity DVT on warfarin, s/p IVC filter placement complicated by migration to right ventricle s/p open cardiotomy, HTN, BPH, strep bovis bacteremia, vertebral osteomyelitis, monoclonal gammopathy presents for removal of infected left intramedullary tibial nail on 7/8. Post up pt with bigeminy and PVCs c/o dizziness with noted hypotension and episodes of bradycardia. Admitted to ICU for post op observation and monitoring.    - POD #1  - cont with pain management with oxycodone as needed  - appreciate ID follow up, placed on vanco and ceftriaxone for infected intramedullary tibial nail now s/p removal  - follow up surgical culture results  - no further episodes of hypotension, HR currently in the 80s, improved from 60s overnight  - cont with metoprolol   - resume amlodipine and losartan home antihypertensive for underlying HTN (no further hypotensive episodes)  - cardiology consult  - ortho follow up  - physical therapy  - cont with lovenox/coumadin bridge  - flomax for underlying BPH  - monitor and supplement electrolytes as needed  - advance diet as tolerates  - stable for transfer to telemetry  - plan of care d/w patient

## 2020-07-09 NOTE — PROGRESS NOTE ADULT - SUBJECTIVE AND OBJECTIVE BOX
Patient is seen and examined at bedside. Denies CP/SOB/Dizziness/N/V/D/HA. Pain is controlled.     Vital Signs Last 24 Hrs  T(C): 36.7 (09 Jul 2020 10:56), Max: 36.7 (09 Jul 2020 10:56)  T(F): 98.1 (09 Jul 2020 10:56), Max: 98.1 (09 Jul 2020 10:56)  HR: 76 (09 Jul 2020 12:00) (60 - 102)  BP: 159/78 (09 Jul 2020 12:00) (103/38 - 160/80)  BP(mean): 102 (09 Jul 2020 12:00) (66 - 115)  RR: 16 (09 Jul 2020 12:00) (10 - 21)  SpO2: 99% (09 Jul 2020 12:00) (91% - 100%)      PHYSICAL EXAM:  General: NAD, WDWN.   Neuro:  Alert & responsive  HEENT: NCAT, EOMI, conjunctiva clear  abd: soft, NT/ND  Right LE: Motor intact + EHL/FHL/TA/GS.  Sensation is grossly intact.  Extremity warm, compartments soft, compressible. No calf tenderness. DP 2+   Left LE: Prineo anterior knee C/D/I. Proximal tibia: + soft collection NTTP. Medial tibia: Prineo dressing C/D/I. +Abrasion distal medial malleolus.  Motor intact +EHL/FHL/TA/GS. Sensation is grossly intact. Extremity warm, compartments soft, compressible. No calf tenderness. DP2+    Labs:                          11.5   10.86 )-----------( 177      ( 09 Jul 2020 04:01 )             34.8       07-09    140  |  104  |  21  ----------------------------<  145<H>  3.7   |  26  |  1.08    Ca    8.8      09 Jul 2020 04:01  Phos  3.2     07-09  Mg     2.2     07-09        A/P: Patient is a 84y y/o Male s/p I&D/RIKI distal femur tibia, POD # 1  -wound care, knee extension/leg elevation, cryocuff, isometric exercises, new medications reviewed with pt  -Pain control/analgesia reviewed  -Inc spirometry reviewed with pt, demonstrated competence  -DVT prophylaxis with Venodynes/lovenox/coumadin  -F/U Cultures: Vanco/Ceftriaxone for now  -PT/OT/WBAT  -medical consult appreciated- await cardiology input for post operative bijeminy.   -DC planning: pending medical
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
## 2020-07-09 NOTE — DIETITIAN INITIAL EVALUATION ADULT. - CURRENT DIET ORDER MEETS ESTIMATED NUTRIENT REQUIREMENTS
12/9/19 MR Pelvis/ABD  IMPRESSION:   No evidence of inflammatory bowel disease.  A 3.0 cm gluteal tract. Statement Selected

## 2020-07-09 NOTE — DIETITIAN INITIAL EVALUATION ADULT. - PERTINENT MEDS FT
MEDICATIONS  (STANDING):  amLODIPine   Tablet 5 milliGRAM(s) Oral daily  ascorbic acid 500 milliGRAM(s) Oral two times a day  atorvastatin 20 milliGRAM(s) Oral at bedtime  cefTRIAXone   IVPB 2000 milliGRAM(s) IV Intermittent every 24 hours  celecoxib 200 milliGRAM(s) Oral two times a day  chlorhexidine 4% Liquid 1 Application(s) Topical <User Schedule>  enoxaparin Injectable 80 milliGRAM(s) SubCutaneous every 12 hours  lactated ringers. 1000 milliLiter(s) (75 mL/Hr) IV Continuous <Continuous>  losartan 100 milliGRAM(s) Oral daily  metoprolol tartrate 50 milliGRAM(s) Oral two times a day  multivitamin 1 Tablet(s) Oral daily  pantoprazole    Tablet 40 milliGRAM(s) Oral before breakfast  polyethylene glycol 3350 17 Gram(s) Oral daily  tamsulosin 0.4 milliGRAM(s) Oral at bedtime  vancomycin  IVPB 1000 milliGRAM(s) IV Intermittent every 12 hours  warfarin 5 milliGRAM(s) Oral once    MEDICATIONS  (PRN):  acetaminophen   Tablet .. 650 milliGRAM(s) Oral every 6 hours PRN Temp greater or equal to 38C (100.4F), Mild Pain (1 - 3)  aluminum hydroxide/magnesium hydroxide/simethicone Suspension 30 milliLiter(s) Oral four times a day PRN Indigestion  magnesium hydroxide Suspension 30 milliLiter(s) Oral daily PRN Constipation  ondansetron Injectable 4 milliGRAM(s) IV Push every 6 hours PRN Nausea and/or Vomiting  oxyCODONE    IR 5 milliGRAM(s) Oral every 4 hours PRN pain 1-5  oxyCODONE    IR 10 milliGRAM(s) Oral every 4 hours PRN pain 6-10  senna 2 Tablet(s) Oral at bedtime PRN Constipation

## 2020-07-10 ENCOUNTER — TRANSCRIPTION ENCOUNTER (OUTPATIENT)
Age: 85
End: 2020-07-10

## 2020-07-10 LAB
-  AMPICILLIN: SIGNIFICANT CHANGE UP
-  CEFTRIAXONE: SIGNIFICANT CHANGE UP
-  CIPROFLOXACIN: SIGNIFICANT CHANGE UP
-  TRIMETHOPRIM/SULFAMETHOXAZOLE: SIGNIFICANT CHANGE UP
ANION GAP SERPL CALC-SCNC: 5 MMOL/L — SIGNIFICANT CHANGE UP (ref 5–17)
BUN SERPL-MCNC: 14 MG/DL — SIGNIFICANT CHANGE UP (ref 7–23)
CA-I BLD-SCNC: 1.22 MMOL/L — SIGNIFICANT CHANGE UP (ref 1.12–1.3)
CALCIUM SERPL-MCNC: 8.5 MG/DL — SIGNIFICANT CHANGE UP (ref 8.5–10.1)
CHLORIDE SERPL-SCNC: 104 MMOL/L — SIGNIFICANT CHANGE UP (ref 96–108)
CO2 SERPL-SCNC: 31 MMOL/L — SIGNIFICANT CHANGE UP (ref 22–31)
CREAT SERPL-MCNC: 0.88 MG/DL — SIGNIFICANT CHANGE UP (ref 0.5–1.3)
GLUCOSE SERPL-MCNC: 113 MG/DL — HIGH (ref 70–99)
HCT VFR BLD CALC: 30.8 % — LOW (ref 39–50)
HGB BLD-MCNC: 10.3 G/DL — LOW (ref 13–17)
INR BLD: 1.33 RATIO — HIGH (ref 0.88–1.16)
MAGNESIUM SERPL-MCNC: 2.2 MG/DL — SIGNIFICANT CHANGE UP (ref 1.6–2.6)
MCHC RBC-ENTMCNC: 30.2 PG — SIGNIFICANT CHANGE UP (ref 27–34)
MCHC RBC-ENTMCNC: 33.4 GM/DL — SIGNIFICANT CHANGE UP (ref 32–36)
MCV RBC AUTO: 90.3 FL — SIGNIFICANT CHANGE UP (ref 80–100)
METHOD TYPE: SIGNIFICANT CHANGE UP
NRBC # BLD: 0 /100 WBCS — SIGNIFICANT CHANGE UP (ref 0–0)
PHOSPHATE SERPL-MCNC: 2.1 MG/DL — LOW (ref 2.5–4.5)
PLATELET # BLD AUTO: 139 K/UL — LOW (ref 150–400)
POTASSIUM SERPL-MCNC: 3.9 MMOL/L — SIGNIFICANT CHANGE UP (ref 3.5–5.3)
POTASSIUM SERPL-SCNC: 3.9 MMOL/L — SIGNIFICANT CHANGE UP (ref 3.5–5.3)
PROTHROM AB SERPL-ACNC: 14.6 SEC — HIGH (ref 10.6–13.6)
RBC # BLD: 3.41 M/UL — LOW (ref 4.2–5.8)
RBC # FLD: 14.4 % — SIGNIFICANT CHANGE UP (ref 10.3–14.5)
SODIUM SERPL-SCNC: 140 MMOL/L — SIGNIFICANT CHANGE UP (ref 135–145)
SURGICAL PATHOLOGY STUDY: SIGNIFICANT CHANGE UP
WBC # BLD: 5.71 K/UL — SIGNIFICANT CHANGE UP (ref 3.8–10.5)
WBC # FLD AUTO: 5.71 K/UL — SIGNIFICANT CHANGE UP (ref 3.8–10.5)

## 2020-07-10 PROCEDURE — 99232 SBSQ HOSP IP/OBS MODERATE 35: CPT

## 2020-07-10 PROCEDURE — 99233 SBSQ HOSP IP/OBS HIGH 50: CPT

## 2020-07-10 RX ORDER — AMLODIPINE BESYLATE 2.5 MG/1
5 TABLET ORAL DAILY
Refills: 0 | Status: DISCONTINUED | OUTPATIENT
Start: 2020-07-11 | End: 2020-07-13

## 2020-07-10 RX ORDER — SODIUM,POTASSIUM PHOSPHATES 278-250MG
2 POWDER IN PACKET (EA) ORAL
Refills: 0 | Status: COMPLETED | OUTPATIENT
Start: 2020-07-10 | End: 2020-07-11

## 2020-07-10 RX ORDER — METOPROLOL TARTRATE 50 MG
50 TABLET ORAL
Refills: 0 | Status: DISCONTINUED | OUTPATIENT
Start: 2020-07-10 | End: 2020-07-13

## 2020-07-10 RX ORDER — LOSARTAN POTASSIUM 100 MG/1
100 TABLET, FILM COATED ORAL DAILY
Refills: 0 | Status: DISCONTINUED | OUTPATIENT
Start: 2020-07-11 | End: 2020-07-13

## 2020-07-10 RX ORDER — WARFARIN SODIUM 2.5 MG/1
5 TABLET ORAL ONCE
Refills: 0 | Status: COMPLETED | OUTPATIENT
Start: 2020-07-10 | End: 2020-07-10

## 2020-07-10 RX ADMIN — AMLODIPINE BESYLATE 5 MILLIGRAM(S): 2.5 TABLET ORAL at 05:26

## 2020-07-10 RX ADMIN — Medication 50 MILLIGRAM(S): at 17:46

## 2020-07-10 RX ADMIN — Medication 500 MILLIGRAM(S): at 17:47

## 2020-07-10 RX ADMIN — CEFTRIAXONE 100 MILLIGRAM(S): 500 INJECTION, POWDER, FOR SOLUTION INTRAMUSCULAR; INTRAVENOUS at 05:24

## 2020-07-10 RX ADMIN — Medication 50 MILLIGRAM(S): at 05:26

## 2020-07-10 RX ADMIN — ATORVASTATIN CALCIUM 20 MILLIGRAM(S): 80 TABLET, FILM COATED ORAL at 22:35

## 2020-07-10 RX ADMIN — CELECOXIB 200 MILLIGRAM(S): 200 CAPSULE ORAL at 17:47

## 2020-07-10 RX ADMIN — Medication 250 MILLIGRAM(S): at 19:22

## 2020-07-10 RX ADMIN — Medication 500 MILLIGRAM(S): at 05:27

## 2020-07-10 RX ADMIN — LOSARTAN POTASSIUM 100 MILLIGRAM(S): 100 TABLET, FILM COATED ORAL at 05:26

## 2020-07-10 RX ADMIN — WARFARIN SODIUM 5 MILLIGRAM(S): 2.5 TABLET ORAL at 22:35

## 2020-07-10 RX ADMIN — Medication 2 PACKET(S): at 12:34

## 2020-07-10 RX ADMIN — Medication 1 TABLET(S): at 12:37

## 2020-07-10 RX ADMIN — TAMSULOSIN HYDROCHLORIDE 0.4 MILLIGRAM(S): 0.4 CAPSULE ORAL at 22:35

## 2020-07-10 RX ADMIN — Medication 2 PACKET(S): at 17:47

## 2020-07-10 RX ADMIN — ENOXAPARIN SODIUM 80 MILLIGRAM(S): 100 INJECTION SUBCUTANEOUS at 17:48

## 2020-07-10 RX ADMIN — ENOXAPARIN SODIUM 80 MILLIGRAM(S): 100 INJECTION SUBCUTANEOUS at 05:26

## 2020-07-10 RX ADMIN — CELECOXIB 200 MILLIGRAM(S): 200 CAPSULE ORAL at 05:26

## 2020-07-10 RX ADMIN — Medication 250 MILLIGRAM(S): at 05:26

## 2020-07-10 RX ADMIN — Medication 2 PACKET(S): at 22:35

## 2020-07-10 RX ADMIN — PANTOPRAZOLE SODIUM 40 MILLIGRAM(S): 20 TABLET, DELAYED RELEASE ORAL at 06:24
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
## 2020-07-10 NOTE — PHYSICAL THERAPY INITIAL EVALUATION ADULT - GAIT TRAINING, PT EVAL
Pt will improve ambulation to ~ 300 feet Independently using straight cane within 3-4 weeks. Pt will negotiate ~ 12 steps c straight cane and rail Independently within 3-4 weeks.
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
## 2020-07-10 NOTE — PHYSICAL THERAPY INITIAL EVALUATION ADULT - PERTINENT HX OF CURRENT PROBLEM, REHAB EVAL
Patient is a 83 y/o male admitted to [facility] due to elective S/P I&D and RIKI of left distal femur and tibia on 7/8/2020.

## 2020-07-10 NOTE — PHYSICAL THERAPY INITIAL EVALUATION ADULT - GAIT DEVIATIONS NOTED, PT EVAL
increased stride width/decreased stride length/decreased step length/decreased ibrahima/decreased weight-shifting ability

## 2020-07-10 NOTE — PHYSICAL THERAPY INITIAL EVALUATION ADULT - GENERAL OBSERVATIONS, REHAB EVAL
Chart (EMR) reviewed. Received supine c HOB elevated, NAD.+cardiac monitor, +heplock, +dressing to Left knee and left ankle intact. Alert. Ox4. Able to follow multistep commands/directions.
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
## 2020-07-10 NOTE — PROGRESS NOTE ADULT - ASSESSMENT
S/P removal of hardware 7/8, purulent material encountered.  7/10: overall stable. olerating antibiotics without problems.    Suggestions--  F/U Cx  Postop care per orthopedics  Continue vanco.ceftriaxone.   F/U vanco trough  Blood cx  Likely will need IV Abx but TBD based on culture results.

## 2020-07-10 NOTE — PHYSICAL THERAPY INITIAL EVALUATION ADULT - IMPAIRMENTS FOUND, PT EVAL
ROM/aerobic capacity/endurance/posture/gait, locomotion, and balance/muscle strength/joint integrity and mobility/neuromotor development and sensory integration

## 2020-07-10 NOTE — PROGRESS NOTE ADULT - SUBJECTIVE AND OBJECTIVE BOX
[facility]  Division of Infectious Diseases  [phone]    Name: KAYLEIGH ALCANTARA  Age: 84y  Gender: Male  MRN: [id number]    Interval History--  Notes reviewed. "I feel all right." Pain 4/10. No fevers, chills, or rigors. No other new complaints.     Past Medical History--  Murmur  Monoclonal gammopathy  Osteomyelitis  Arthritis  DVT (deep venous thrombosis)  HTN (hypertension)  History of arthroscopy of shoulder  H/O hernia repair  History of hip surgery  Presence of IVC filter      For details regarding the patient's social history, family history, and other miscellaneous elements, please refer the initial infectious diseases consultation and/or the admitting history and physical examination for this admission.    Allergies    No Known Allergies    Intolerances        Medications--  Antibiotics:  cefTRIAXone   IVPB 2000 milliGRAM(s) IV Intermittent every 24 hours  vancomycin  IVPB 750 milliGRAM(s) IV Intermittent every 12 hours    Immunologic:    Other:  acetaminophen   Tablet .. PRN  aluminum hydroxide/magnesium hydroxide/simethicone Suspension PRN  ascorbic acid  atorvastatin  bisacodyl Suppository PRN  celecoxib  enoxaparin Injectable  lactated ringers.  magnesium hydroxide Suspension PRN  metoprolol tartrate  multivitamin  ondansetron Injectable PRN  oxyCODONE    IR PRN  oxyCODONE    IR PRN  pantoprazole    Tablet  polyethylene glycol 3350  potassium phosphate / sodium phosphate Powder (PHOS-NaK)  senna PRN  tamsulosin  warfarin      Review of Systems--  A 10-point review of systems was obtained.   Review of systems otherwise unchanged compared to prior visit except as previously noted.    Physical Examination--  Vital Signs: T(F): 98.6 (07-10-20 @ 11:10), Max: 99 (07-10-20 @ 04:59)  HR: 78 (07-10-20 @ 11:10)  BP: 139/82 (07-10-20 @ 11:10)  RR: 17 (07-10-20 @ 11:10)  SpO2: 97% (07-10-20 @ 11:10)  Wt(kg): --  General: Nontoxic-appearing Male in no acute distress.  HEENT: AT/NC. Anicteric. Conjunctiva pink and moist. Oropharynx clear.  Neck: Not rigid. No sense of mass.  Nodes: None palpable.  Lungs: Clear bilaterally without rales, wheezing or rhonchi  Heart: Regular rate and rhythm. II-III/VI SM No rub. No gallop. No palpable thrill.  Abdomen: Bowel sounds present and normoactive. Soft. Nondistended. Nontender. No sense of mass. No organomegaly.  Back: No spinal tenderness. No costovertebral angle tenderness.   Extremities: No cyanosis or clubbing. No edema. No change compared with yesterday's exam.   Skin: Warm. Dry. Good turgor. No rash. No vasculitic stigmata.  Psychiatric: Appropriate affect and mood for situation.       Laboratory Studies--  CBC                        10.3   5.71  )-----------( 139      ( 10 Jul 2020 06:56 )             30.8       Chemistries  07-10    140  |  104  |  14  ----------------------------<  113<H>  3.9   |  31  |  0.88    Ca    8.5      10 Jul 2020 06:56  Phos  2.1     07-10  Mg     2.2     07-10        Culture Data  Culture - Blood (collected 09 Jul 2020 08:57)  Source: .Blood Blood  Preliminary Report (10 Jul 2020 09:01):    No growth to date.    Culture - Blood (collected 09 Jul 2020 08:57)  Source: .Blood Blood  Preliminary Report (10 Jul 2020 09:01):    No growth to date.    Culture - Tissue with Gram Stain (collected 09 Jul 2020 00:26)  Source: .Tissue LEFT TIBIA #2  Gram Stain (prelim) (09 Jul 2020 22:49):    Few polymorphonuclear leukocytes seen per low power field    No organisms seen per oil power field  Preliminary Report (09 Jul 2020 22:49):    No growth    Culture - Abscess with Gram Stain (collected 09 Jul 2020 00:24)  Source: .Abscess LEFT TIBIAL ABSCESS  Preliminary Report (09 Jul 2020 20:59):    No growth    Culture - Tissue with Gram Stain (collected 09 Jul 2020 00:21)  Source: .Tissue LEFT TIBIA #3  Gram Stain (prelim) (09 Jul 2020 21:15):    Few polymorphonuclear leukocytes seen per low power field    No organisms seen per oil power field  Preliminary Report (09 Jul 2020 21:15):    No growth    Culture - Tissue with Gram Stain (collected 09 Jul 2020 00:19)  Source: .Tissue LEFT TIBIA #1  Gram Stain (prelim) (09 Jul 2020 22:50):    Few polymorphonuclear leukocytes seen per low power field    No organisms seen per oil power field  Preliminary Report (09 Jul 2020 22:50):    No growth

## 2020-07-10 NOTE — PHYSICAL THERAPY INITIAL EVALUATION ADULT - ADDITIONAL COMMENTS
Patient lives c wife in a pvt house c 2 entry steps c L rail up, 1 flight of stairs c R rail up inside. Independent c all ADL's and ambulation with straight cane on community only.

## 2020-07-10 NOTE — PHYSICAL THERAPY INITIAL EVALUATION ADULT - RANGE OF MOTION EXAMINATION, REHAB EVAL
bilateral upper extremity ROM was WNL (within normal limits)/except left knee, left hip and left elbow due to pain and contractures./bilateral lower extremity was ROM was WNL (within normal limits)/deficits as listed below

## 2020-07-10 NOTE — PROGRESS NOTE ADULT - SUBJECTIVE AND OBJECTIVE BOX
84yMale s/p . Pt seen and examined in NAD. Pain controlled. Pt denies any new complaints. Pt denies CP/SOB/N/V/D/numbness/tingling/bowel or bladder dysfunction.     PE:     Spine: Dressing c/d/i +HV +JULIA   B/L UE: Skin intact. +ROM shoulder/elbow/wrist/fingers. +ok/thumbsup/fingercross signs.  strength: 5/5.  RP2+ NVI.   B/L LE: Skin intact. +ROM hip/knee/ankle/toes. Ankle Dorsi/plantarflexion: 5/5. Calf: soft, compressible and nontender. DP/PT 2+ NVI.     LLE: dressing c/d/i. +ROM ankle/toes. Calf: soft, compressible and nontender. DP/PT 2+ NVI.                           10.3   5.71  )-----------( 139      ( 10 Jul 2020 06:56 )             30.8       07-10    140  |  104  |  14  ----------------------------<  113<H>  3.9   |  31  |  0.88    Ca    8.5      10 Jul 2020 06:56  Phos  2.1     07-10  Mg     2.2     07-10          A/P: 84yMale s/p [sx].  Dressing changed - mepelix applied.   Pain controlled    +/- Total hip precautions    PT: WBAT - spinal precautions   DVT ppx: SCDs   Wound care, Isometric exercises, incentive spirometry   Discharge: planning   All the above discussed and understood by pt 84yMale s/p L Leg RIKI (Tibial Nail) and I&D. Pt seen and examined in NAD. Pain controlled. Pt denies any new complaints. pt was transferred from ICU to Telemetry floor. Pt denies CP/SOB/N/V/D/numbness/tingling/bowel or bladder dysfunction.     PE:   LLE: dressing c/d/i. (+) hematoma distal to knee, +ROM ankle/toes. Calf: soft, compressible and nontender. DP/PT 2+ NVI.                           10.3   5.71  )-----------( 139      ( 10 Jul 2020 06:56 )             30.8       07-10    140  |  104  |  14  ----------------------------<  113<H>  3.9   |  31  |  0.88    Ca    8.5      10 Jul 2020 06:56  Phos  2.1     07-10  Mg     2.2     07-10          A/P: 84yMale s/p L Leg RIKI (Tibial Nail) and I&D. POD#2  Elevation  Pain controlled  PT: WBAT   DVT ppx: SCDs and Coumadin with Lovenox bridge as per his Cardiologist  F/U INR daily and dose coumadin accordingly  F/U OR Cx: NGTD and Bld CX: NGTD  Antibiotics as per ID  F/U Echo as per Cardio  Wound care, Isometric exercises, incentive spirometry   Discharge: planning   All the above discussed and understood by pt
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
## 2020-07-11 LAB
ANION GAP SERPL CALC-SCNC: 4 MMOL/L — LOW (ref 5–17)
BUN SERPL-MCNC: 18 MG/DL — SIGNIFICANT CHANGE UP (ref 7–23)
CALCIUM SERPL-MCNC: 8.4 MG/DL — LOW (ref 8.5–10.1)
CHLORIDE SERPL-SCNC: 104 MMOL/L — SIGNIFICANT CHANGE UP (ref 96–108)
CO2 SERPL-SCNC: 32 MMOL/L — HIGH (ref 22–31)
CREAT SERPL-MCNC: 1 MG/DL — SIGNIFICANT CHANGE UP (ref 0.5–1.3)
GLUCOSE SERPL-MCNC: 135 MG/DL — HIGH (ref 70–99)
HCT VFR BLD CALC: 31.5 % — LOW (ref 39–50)
HGB BLD-MCNC: 10.1 G/DL — LOW (ref 13–17)
INR BLD: 1.25 RATIO — HIGH (ref 0.88–1.16)
MAGNESIUM SERPL-MCNC: 2 MG/DL — SIGNIFICANT CHANGE UP (ref 1.6–2.6)
MCHC RBC-ENTMCNC: 30.1 PG — SIGNIFICANT CHANGE UP (ref 27–34)
MCHC RBC-ENTMCNC: 32.1 GM/DL — SIGNIFICANT CHANGE UP (ref 32–36)
MCV RBC AUTO: 93.8 FL — SIGNIFICANT CHANGE UP (ref 80–100)
NRBC # BLD: 0 /100 WBCS — SIGNIFICANT CHANGE UP (ref 0–0)
PHOSPHATE SERPL-MCNC: 2.6 MG/DL — SIGNIFICANT CHANGE UP (ref 2.5–4.5)
PLATELET # BLD AUTO: 131 K/UL — LOW (ref 150–400)
POTASSIUM SERPL-MCNC: 4 MMOL/L — SIGNIFICANT CHANGE UP (ref 3.5–5.3)
POTASSIUM SERPL-SCNC: 4 MMOL/L — SIGNIFICANT CHANGE UP (ref 3.5–5.3)
PROTHROM AB SERPL-ACNC: 13.8 SEC — HIGH (ref 10.6–13.6)
RBC # BLD: 3.36 M/UL — LOW (ref 4.2–5.8)
RBC # FLD: 14.3 % — SIGNIFICANT CHANGE UP (ref 10.3–14.5)
SODIUM SERPL-SCNC: 140 MMOL/L — SIGNIFICANT CHANGE UP (ref 135–145)
VANCOMYCIN TROUGH SERPL-MCNC: 22.5 UG/ML — HIGH (ref 10–20)
WBC # BLD: 5.05 K/UL — SIGNIFICANT CHANGE UP (ref 3.8–10.5)
WBC # FLD AUTO: 5.05 K/UL — SIGNIFICANT CHANGE UP (ref 3.8–10.5)

## 2020-07-11 PROCEDURE — 99233 SBSQ HOSP IP/OBS HIGH 50: CPT

## 2020-07-11 PROCEDURE — 99232 SBSQ HOSP IP/OBS MODERATE 35: CPT

## 2020-07-11 RX ORDER — CIPROFLOXACIN LACTATE 400MG/40ML
500 VIAL (ML) INTRAVENOUS EVERY 12 HOURS
Refills: 0 | Status: DISCONTINUED | OUTPATIENT
Start: 2020-07-11 | End: 2020-07-13

## 2020-07-11 RX ORDER — WARFARIN SODIUM 2.5 MG/1
7.5 TABLET ORAL ONCE
Refills: 0 | Status: DISCONTINUED | OUTPATIENT
Start: 2020-07-11 | End: 2020-07-11

## 2020-07-11 RX ORDER — WARFARIN SODIUM 2.5 MG/1
10 TABLET ORAL ONCE
Refills: 0 | Status: COMPLETED | OUTPATIENT
Start: 2020-07-11 | End: 2020-07-11

## 2020-07-11 RX ADMIN — Medication 50 MILLIGRAM(S): at 11:43

## 2020-07-11 RX ADMIN — CELECOXIB 200 MILLIGRAM(S): 200 CAPSULE ORAL at 06:22

## 2020-07-11 RX ADMIN — Medication 500 MILLIGRAM(S): at 06:22

## 2020-07-11 RX ADMIN — POLYETHYLENE GLYCOL 3350 17 GRAM(S): 17 POWDER, FOR SOLUTION ORAL at 11:49

## 2020-07-11 RX ADMIN — Medication 2 PACKET(S): at 11:44

## 2020-07-11 RX ADMIN — WARFARIN SODIUM 10 MILLIGRAM(S): 2.5 TABLET ORAL at 22:32

## 2020-07-11 RX ADMIN — TAMSULOSIN HYDROCHLORIDE 0.4 MILLIGRAM(S): 0.4 CAPSULE ORAL at 22:32

## 2020-07-11 RX ADMIN — ATORVASTATIN CALCIUM 20 MILLIGRAM(S): 80 TABLET, FILM COATED ORAL at 22:33

## 2020-07-11 RX ADMIN — AMLODIPINE BESYLATE 5 MILLIGRAM(S): 2.5 TABLET ORAL at 06:22

## 2020-07-11 RX ADMIN — Medication 500 MILLIGRAM(S): at 17:43

## 2020-07-11 RX ADMIN — CELECOXIB 200 MILLIGRAM(S): 200 CAPSULE ORAL at 17:28

## 2020-07-11 RX ADMIN — ENOXAPARIN SODIUM 80 MILLIGRAM(S): 100 INJECTION SUBCUTANEOUS at 06:23

## 2020-07-11 RX ADMIN — Medication 1 TABLET(S): at 11:43

## 2020-07-11 RX ADMIN — OXYCODONE HYDROCHLORIDE 10 MILLIGRAM(S): 5 TABLET ORAL at 23:28

## 2020-07-11 RX ADMIN — Medication 50 MILLIGRAM(S): at 17:28

## 2020-07-11 RX ADMIN — Medication 500 MILLIGRAM(S): at 17:28

## 2020-07-11 RX ADMIN — ENOXAPARIN SODIUM 80 MILLIGRAM(S): 100 INJECTION SUBCUTANEOUS at 17:28

## 2020-07-11 RX ADMIN — CEFTRIAXONE 100 MILLIGRAM(S): 500 INJECTION, POWDER, FOR SOLUTION INTRAMUSCULAR; INTRAVENOUS at 05:13

## 2020-07-11 RX ADMIN — PANTOPRAZOLE SODIUM 40 MILLIGRAM(S): 20 TABLET, DELAYED RELEASE ORAL at 06:23

## 2020-07-11 RX ADMIN — Medication 250 MILLIGRAM(S): at 06:23

## 2020-07-11 RX ADMIN — LOSARTAN POTASSIUM 100 MILLIGRAM(S): 100 TABLET, FILM COATED ORAL at 06:23

## 2020-07-11 NOTE — OCCUPATIONAL THERAPY INITIAL EVALUATION ADULT - LLE MMT, REHAB EVAL
Grossly greater then 3/5 hip flexion; Grossly equal to or greater then 3/5 knee flexion; Grossly greater then 3/5 strength distally to knee.
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
## 2020-07-11 NOTE — OCCUPATIONAL THERAPY INITIAL EVALUATION ADULT - BALANCE TRAINING, PT EVAL
Patient will be able to increase static and dynamic sitting/standing by 1/2 grade in order to participate in self care tasks and functional mobility/transfers within 2 weeks
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
## 2020-07-11 NOTE — OCCUPATIONAL THERAPY INITIAL EVALUATION ADULT - TRANSFER TRAINING, PT EVAL
Patient will be able to perform functional transfers, using least restrictive device, independently within 1 weeks.

## 2020-07-11 NOTE — OCCUPATIONAL THERAPY INITIAL EVALUATION ADULT - TRANSFER SAFETY CONCERNS NOTED: TOILET, REHAB EVAL
decreased balance during turns/decreased step length/decreased weight-shifting ability/inability to maintain weight-bearing restrictions w/o assist/decreased sequencing ability

## 2020-07-11 NOTE — PROGRESS NOTE ADULT - SUBJECTIVE AND OBJECTIVE BOX
84yMale s/p L TIbial Nail Removal of Hardware and I&D. Pt seen and examined in NAD. Pain controlled. Pt denies any new complaints. Pt denies CP/SOB/N/V/D/numbness/tingling/bowel or bladder dysfunction. (+)voids (+)flatus     PE:   LLE: dressing c/d/i. (+)hematoma +ROM ankle/toes. Calf: soft, compressible and nontender. DP/PT 2+ NVI.                           10.1   5.05  )-----------( 131      ( 11 Jul 2020 07:39 )             31.5       07-11    140  |  104  |  18  ----------------------------<  135<H>  4.0   |  32<H>  |  1.00    Ca    8.4<L>      11 Jul 2020 07:39  Phos  2.6     07-11  Mg     2.0     07-11          A/P: 84yMale s/p L Tibial Nail Removal of Hardware and I&D. POD#3  Elevation LLE  Pain control prn  PT: WBAT   DVT ppx: SCDs and Coumadin with Lovenox bridge.   F/U OR Cx: Rare salmonella species. F/U Bld Cx: NGTD  F/U ID plan for final antibiotic plan on discharge  Cardio rec appreciated: Needs OMAYRA, can be done outpatient.   Wound care, Isometric exercises, incentive spirometry   All the above discussed and understood by pt

## 2020-07-11 NOTE — OCCUPATIONAL THERAPY INITIAL EVALUATION ADULT - GENERAL OBSERVATIONS, REHAB EVAL
Pt was encountered semi-supine in bed; NAD, pneumatic pumps +, 02 via nasal cannula on 2LO2 (Removed nasal cannula because pt O2 saturation was 98%. Patient O2 saturation remained the same on room air), s/p L Tibial Nail Removal of Hardware and I&D. POD#3, LLE WBAT, L knee dressing clean, dry and intact, AXOX4, cooperative, followed commands; pt c/o pain in L knee which impacts pt performance with functional ADL's/transfers and mobility.

## 2020-07-11 NOTE — OCCUPATIONAL THERAPY INITIAL EVALUATION ADULT - TRANSFER SAFETY CONCERNS NOTED: SIT/STAND, REHAB EVAL
decreased balance during turns/decreased sequencing ability/decreased step length/inability to maintain weight-bearing restrictions w/o assist/decreased weight-shifting ability

## 2020-07-11 NOTE — OCCUPATIONAL THERAPY INITIAL EVALUATION ADULT - ADDITIONAL COMMENTS
Patient lives with wife (Who can assist when home) in a private house with 2 steps with L rail to enter. Once inside, the patient has a flight of steps with a R rail up to the second floor where the bedroom and bathroom is. The patient bathroom has a tub/shower combination, fixed shower head, regular toilet and a grab bar inside of the tub/shower. The patient ambulates with no device inside of the home and uses a cane outside of the home. The patient Is R handed and wears glasses all the time.

## 2020-07-11 NOTE — OCCUPATIONAL THERAPY INITIAL EVALUATION ADULT - ADL RETRAINING, OT EVAL
Patient will be able to perform functional tasks with independence, using least restrictive device, within 1 weeks.

## 2020-07-11 NOTE — OCCUPATIONAL THERAPY INITIAL EVALUATION ADULT - BED MOBILITY TRAINING, PT EVAL
Patient will be able to perform bed mobility tasks independently, using least restrictive device, within 1 weeks.

## 2020-07-12 ENCOUNTER — TRANSCRIPTION ENCOUNTER (OUTPATIENT)
Age: 85
End: 2020-07-12

## 2020-07-12 LAB
ANION GAP SERPL CALC-SCNC: 5 MMOL/L — SIGNIFICANT CHANGE UP (ref 5–17)
BUN SERPL-MCNC: 19 MG/DL — SIGNIFICANT CHANGE UP (ref 7–23)
CALCIUM SERPL-MCNC: 8.6 MG/DL — SIGNIFICANT CHANGE UP (ref 8.5–10.1)
CHLORIDE SERPL-SCNC: 104 MMOL/L — SIGNIFICANT CHANGE UP (ref 96–108)
CO2 SERPL-SCNC: 31 MMOL/L — SIGNIFICANT CHANGE UP (ref 22–31)
CREAT SERPL-MCNC: 1 MG/DL — SIGNIFICANT CHANGE UP (ref 0.5–1.3)
GLUCOSE SERPL-MCNC: 100 MG/DL — HIGH (ref 70–99)
HCT VFR BLD CALC: 31.2 % — LOW (ref 39–50)
HGB BLD-MCNC: 9.9 G/DL — LOW (ref 13–17)
INR BLD: 1.31 RATIO — HIGH (ref 0.88–1.16)
MAGNESIUM SERPL-MCNC: 2 MG/DL — SIGNIFICANT CHANGE UP (ref 1.6–2.6)
MCHC RBC-ENTMCNC: 29.8 PG — SIGNIFICANT CHANGE UP (ref 27–34)
MCHC RBC-ENTMCNC: 31.7 GM/DL — LOW (ref 32–36)
MCV RBC AUTO: 94 FL — SIGNIFICANT CHANGE UP (ref 80–100)
NRBC # BLD: 0 /100 WBCS — SIGNIFICANT CHANGE UP (ref 0–0)
PHOSPHATE SERPL-MCNC: 3.2 MG/DL — SIGNIFICANT CHANGE UP (ref 2.5–4.5)
PLATELET # BLD AUTO: 128 K/UL — LOW (ref 150–400)
POTASSIUM SERPL-MCNC: 4.1 MMOL/L — SIGNIFICANT CHANGE UP (ref 3.5–5.3)
POTASSIUM SERPL-SCNC: 4.1 MMOL/L — SIGNIFICANT CHANGE UP (ref 3.5–5.3)
PROTHROM AB SERPL-ACNC: 14.4 SEC — HIGH (ref 10.6–13.6)
RBC # BLD: 3.32 M/UL — LOW (ref 4.2–5.8)
RBC # FLD: 14.2 % — SIGNIFICANT CHANGE UP (ref 10.3–14.5)
SODIUM SERPL-SCNC: 140 MMOL/L — SIGNIFICANT CHANGE UP (ref 135–145)
WBC # BLD: 3.91 K/UL — SIGNIFICANT CHANGE UP (ref 3.8–10.5)
WBC # FLD AUTO: 3.91 K/UL — SIGNIFICANT CHANGE UP (ref 3.8–10.5)

## 2020-07-12 PROCEDURE — 99232 SBSQ HOSP IP/OBS MODERATE 35: CPT

## 2020-07-12 RX ORDER — WARFARIN SODIUM 2.5 MG/1
10 TABLET ORAL ONCE
Refills: 0 | Status: COMPLETED | OUTPATIENT
Start: 2020-07-12 | End: 2020-07-12

## 2020-07-12 RX ADMIN — WARFARIN SODIUM 10 MILLIGRAM(S): 2.5 TABLET ORAL at 21:02

## 2020-07-12 NOTE — DISCHARGE NOTE PROVIDER - PROVIDER TOKENS
PROVIDER:[TOKEN:[[id number]]],PROVIDER:[TOKEN:[[id number]]] PROVIDER:[TOKEN:[[id number]]],PROVIDER:[TOKEN:[[id number]]],FREE:[LAST:[ID clinic],PHONE:[phone]],FAX:[(   )    -],ADDRESS:[[address] [place] [address]]] PROVIDER:[TOKEN:[[id number]]],PROVIDER:[TOKEN:[[id number]]],PROVIDER:[TOKEN:[[id number]]],FREE:[LAST:[ID clinic],PHONE:[phone]],FAX:[(   )    -],ADDRESS:[[address] [place] [address]]],FREE:[LAST:[Your Cardiologist],PHONE:[(   )    -],FAX:[(   )    -]]

## 2020-07-12 NOTE — DISCHARGE NOTE PROVIDER - CARE PROVIDERS DIRECT ADDRESSES
,DirectAddress_Unknown,[email or web address] ,DirectAddress_Unknown,[email or web address],DirectAddress_Unknown ,[email or web address],[email or web address],DirectAddress_Unknown,DirectAddress_Unknown

## 2020-07-12 NOTE — DISCHARGE NOTE PROVIDER - NSDCACTIVITY_GEN_ALL_CORE
Showering allowed/Stairs allowed/No heavy lifting/straining/Walking - Indoors allowed/Do not drive or operate machinery/Walking - Outdoors allowed
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
## 2020-07-12 NOTE — DISCHARGE NOTE PROVIDER - NSDCFUADDAPPT_GEN_ALL_CORE_FT
Follow up with your surgeon in two weeks. Call for appointment.  If you need more pain medication, call your surgeon's office. For medication refills or authorizations, please call [phone] xt 2301    Follow up with Dr Zhao, associate of Dr. Albarran the infectious disease doctor that saw you in the hospital.     Follow up with your cardiologist for INR check.     We recommend that you call and schedule a follow up appointment with your primary care physician for repeat blood work (CBC and BMP) for post hospital discharge follow-up care.  Call your surgeon if you have increased redness/pain/drainage or fever. Return to ER for shortness of breath/calf tenderness. Follow up with your surgeon in two weeks. Call for appointment.  If you need more pain medication, call your surgeon's office. For medication refills or authorizations, please call [phone] xt 2301    Follow up with Dr Zhao, associate of Dr. Albarran the infectious disease doctor that saw you in the hospital.     Follow up with your cardiologist for INR check and to schedule OMAYRA (transesophageal echocardiogram) to assess aortic stenosis severity.     We recommend that you call and schedule a follow up appointment with your primary care physician for repeat blood work (CBC and BMP) for post hospital discharge follow-up care.  Call your surgeon if you have increased redness/pain/drainage or fever. Return to ER for shortness of breath/calf tenderness. Follow up with your surgeon in two weeks. Call for appointment.  If you need more pain medication, call your surgeon's office. For medication refills or authorizations, please call [phone] xt 2301    Follow up with Dr Zhao, associate of Dr. Albarran the infectious disease doctor that saw you in the hospital.     Follow up with your cardiologist for Holter monitor to evaluate for arrhythmias/ heart block and to schedule OMAYRA (transesophageal echocardiogram) to assess aortic stenosis severity.     We recommend that you call and schedule a follow up appointment with your primary care physician for repeat blood work (CBC and BMP) for post hospital discharge follow-up care.  Call your surgeon if you have increased redness/pain/drainage or fever. Return to ER for shortness of breath/calf tenderness.

## 2020-07-12 NOTE — DISCHARGE NOTE PROVIDER - CARE PROVIDER_API CALL
Enmanuel Veras  ORTHOPAEDIC SURGERY  [address]  [place] [address]  Phone: [phone]  Fax: [phone]  Follow Up Time:     Maggi Zhao  INFECTIOUS DISEASE  [address] [place] [address]   [place] [address]  Phone: [phone]  Fax: [phone]  Follow Up Time: Enmanuel Veras  ORTHOPAEDIC SURGERY  [address] [facility] [address]  [place] [address]  Phone: [phone]  Fax: [phone]  Follow Up Time:     Maggi Zhao  INFECTIOUS DISEASE  [address] [place] [address]  Phone: [phone]  Fax: [phone]  Follow Up Time:     ID clinic,   [address] [place] [address]  Phone: [phone]  Fax: (   )    -  Follow Up Time: Enmanuel Veras  ORTHOPAEDIC SURGERY  [address] [facility] [address]  [place] [address]  Phone: [phone]  Fax: [phone]  Follow Up Time:     Maggi Zhao  INFECTIOUS DISEASE  [address] [place] [address]  Phone: [phone]  Fax: [phone]  Follow Up Time:     Greyson Hopkins [address] [address]  Phone: [phone]  Fax: [phone]  Follow Up Time:     ID clinic,   [address] [facility]  Phone: [phone]  Fax: (   )    -  Follow Up Time:     Your Cardiologist,   Phone: (   )    -  Fax: (   )    -  Follow Up Time:

## 2020-07-12 NOTE — DISCHARGE NOTE PROVIDER - NSDCMRMEDTOKEN_GEN_ALL_CORE_FT
amLODIPine 2.5 mg oral tablet: 3 tab(s) orally once a day  atorvastatin 20 mg oral tablet: 1 tab(s) orally once a day  irbesartan 300 mg oral tablet: 1 tab(s) orally once a day  metoprolol tartrate 50 mg oral tablet: 1 tab(s) orally 2 times a day  Multiple Vitamins oral tablet: 1 tab(s) orally once a day  tamsulosin 0.4 mg oral capsule: 1 cap(s) orally once a day  Vitamin D3 1000 intl units oral capsule: 1 cap(s) orally once a day  warfarin 1 mg oral tablet: 1  orally 3 times a week  warfarin 5 mg oral tablet: 1 tab(s) orally once a day amLODIPine 2.5 mg oral tablet: 3 tab(s) orally once a day  atorvastatin 20 mg oral tablet: 1 tab(s) orally once a day  irbesartan 300 mg oral tablet: 1 tab(s) orally once a day  levoFLOXacin 500 mg oral tablet: 1 tab(s) orally once a day   metoprolol tartrate 50 mg oral tablet: 1 tab(s) orally 2 times a day  Multiple Vitamins oral tablet: 1 tab(s) orally once a day  tamsulosin 0.4 mg oral capsule: 1 cap(s) orally once a day  Vitamin D3 1000 intl units oral capsule: 1 cap(s) orally once a day  warfarin 1 mg oral tablet: 1  orally 3 times a week  warfarin 5 mg oral tablet: 1 tab(s) orally once a day

## 2020-07-12 NOTE — DISCHARGE NOTE PROVIDER - NSDCFUADDINST_GEN_ALL_CORE_FT
Keep Prineo Dressing Clean, Dry and Intact. May shower with Prineo Dressing. Please do not scrub, soak, peel or pick at the prineo dressing. No creams, lotions, or oils over dressing. May shower and let water run over incision, no baths. Pat dry once out of shower. Dressing to be removed in office at follow up visit in 2 weeks. There are no staples or stitches that need to be removed.

## 2020-07-12 NOTE — PROGRESS NOTE ADULT - SUBJECTIVE AND OBJECTIVE BOX
Patient is seen and examined at bedside. Denies CP/SOB/Dizziness/N/V/D/HA. Pain is controlled. Ambulating well. Anticipating DC home soon.    Vital Signs Last 24 Hrs  T(C): 37 (12 Jul 2020 05:04), Max: 37.2 (11 Jul 2020 23:17)  T(F): 98.6 (12 Jul 2020 05:04), Max: 98.9 (11 Jul 2020 23:17)  HR: 66 (12 Jul 2020 07:04) (66 - 101)  BP: 148/79 (12 Jul 2020 05:04) (109/69 - 152/82)  BP(mean): 105 (11 Jul 2020 17:33) (105 - 105)  RR: 17 (12 Jul 2020 05:04) (17 - 18)  SpO2: 97% (12 Jul 2020 05:04) (93% - 97%)      PHYSICAL EXAM:  General: NAD, WDWN.   Neuro:  Alert & responsive  HEENT: NCAT, EOMI, conjunctiva clear  abd: soft, NT/ND  Right LE: Motor intact + EHL/FHL/TA/GS.  Sensation is grossly intact.  Extremity warm, compartments soft, compressible. No calf tenderness. DP 2+   Left LE: Prineo dressing anterior knee C/D/I. + localized edema over anterior proximal tibia NTTP. Medial proximal tibia prineo dressing C/D/I. Motor intact +EHL/FHL/TA/GS. Sensation is grossly intact. +abrasion medial malleolus. Extremity warm, compartments soft, compressible. No calf tenderness. DP2+    Labs:                          9.9    3.91  )-----------( 128      ( 12 Jul 2020 07:44 )             31.2       07-12    140  |  104  |  19  ----------------------------<  100<H>  4.1   |  31  |  1.00    Ca    8.6      12 Jul 2020 07:44  Phos  3.2     07-12  Mg     2.0     07-12        A/P: Patient is a 84y y/o Male s/p RIKI tibial nail/I&D, POD # 4  -Pain control/analgesia adequate  -Inc spirometry   -DVT prophylaxis therapeutic lovenox and coumadin bridge  -OR cxs: Salmonella. On cipro.  -PT/OT/WBAT  -Cadio: Severe AS - follow up with cardio outpatient  -DC planning: home with home care pending final ID plan.
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
## 2020-07-13 ENCOUNTER — TRANSCRIPTION ENCOUNTER (OUTPATIENT)
Age: 85
End: 2020-07-13

## 2020-07-13 VITALS
TEMPERATURE: 98 F | RESPIRATION RATE: 18 BRPM | OXYGEN SATURATION: 96 % | DIASTOLIC BLOOD PRESSURE: 75 MMHG | HEART RATE: 90 BPM | SYSTOLIC BLOOD PRESSURE: 130 MMHG

## 2020-07-13 DIAGNOSIS — Z98.890 OTHER SPECIFIED POSTPROCEDURAL STATES: ICD-10-CM

## 2020-07-13 DIAGNOSIS — D61.818 OTHER PANCYTOPENIA: ICD-10-CM

## 2020-07-13 DIAGNOSIS — A02.9 SALMONELLA INFECTION, UNSPECIFIED: ICD-10-CM

## 2020-07-13 DIAGNOSIS — I82.409 ACUTE EMBOLISM AND THROMBOSIS OF UNSPECIFIED DEEP VEINS OF UNSPECIFIED LOWER EXTREMITY: ICD-10-CM

## 2020-07-13 LAB
CULTURE RESULTS: SIGNIFICANT CHANGE UP
GRAM STN FLD: SIGNIFICANT CHANGE UP
INR BLD: 1.4 RATIO — HIGH (ref 0.88–1.16)
ORGANISM # SPEC MICROSCOPIC CNT: SIGNIFICANT CHANGE UP
ORGANISM # SPEC MICROSCOPIC CNT: SIGNIFICANT CHANGE UP
PROTHROM AB SERPL-ACNC: 15.4 SEC — HIGH (ref 10.6–13.6)
SPECIMEN SOURCE: SIGNIFICANT CHANGE UP

## 2020-07-13 PROCEDURE — 99233 SBSQ HOSP IP/OBS HIGH 50: CPT

## 2020-07-13 PROCEDURE — 99239 HOSP IP/OBS DSCHRG MGMT >30: CPT

## 2020-07-13 NOTE — PROGRESS NOTE ADULT - PROBLEM SELECTOR PLAN 2
As above  OPD GI evaluation to reassess need for further workup for source  At this point yield on stool culture minimal

## 2020-07-13 NOTE — PROGRESS NOTE ADULT - SUBJECTIVE AND OBJECTIVE BOX
[facility]  Division of Infectious Diseases  [phone]    Name: KAYLEIGH ALCANTARA  Age: 84y  Gender: Male  MRN: [id number]    Interval History--  Notes reviewed. Feeling pretty good. Pain not an issue. No fevers, chills, or rigors.   Salmonella spp grew from OR cx x2, R ceftriaxone changed to PO FQ.       Past Medical History--  Murmur  Monoclonal gammopathy  Osteomyelitis  Arthritis  DVT (deep venous thrombosis)  HTN (hypertension)  History of arthroscopy of shoulder  H/O hernia repair  History of hip surgery  Presence of IVC filter      For details regarding the patient's social history, family history, and other miscellaneous elements, please refer the initial infectious diseases consultation and/or the admitting history and physical examination for this admission.    Allergies    No Known Allergies    Intolerances        Medications--  Antibiotics:  ciprofloxacin     Tablet 500 milliGRAM(s) Oral every 12 hours    Immunologic:    Other:  acetaminophen   Tablet .. PRN  aluminum hydroxide/magnesium hydroxide/simethicone Suspension PRN  amLODIPine   Tablet  ascorbic acid  atorvastatin  bisacodyl Suppository PRN  celecoxib  enoxaparin Injectable  lactated ringers.  losartan  magnesium hydroxide Suspension PRN  metoprolol tartrate  multivitamin  ondansetron Injectable PRN  oxyCODONE    IR PRN  oxyCODONE    IR PRN  pantoprazole    Tablet  polyethylene glycol 3350  senna PRN  tamsulosin      Review of Systems--  A 10-point review of systems was obtained.   Review of systems otherwise negative except as previously noted.    Physical Examination--  Vital Signs: T(F): 98.5 (07-13-20 @ 05:00), Max: 98.7 (07-12-20 @ 12:29)  HR: 92 (07-13-20 @ 05:00)  BP: 149/85 (07-13-20 @ 05:00)  RR: 17 (07-13-20 @ 05:00)  SpO2: 95% (07-13-20 @ 05:00)  Wt(kg): --  General: Nontoxic-appearing Male in no acute distress.  HEENT: AT/NC. Anicteric. Conjunctiva pink and moist. Oropharynx clear.  Neck: Not rigid. No sense of mass.  Nodes: None palpable.  Lungs: Clear bilaterally without rales, wheezing or rhonchi  Heart: Regular rate and rhythm. II-III/VI SM No rub. No gallop. No palpable thrill.  Abdomen: Bowel sounds present and normoactive. Soft. Nondistended. Nontender. No sense of mass. No organomegaly.  Back: No spinal tenderness. No costovertebral angle tenderness.   Extremities: No cyanosis or clubbing. No edema. No change compared with yesterday's exam.   Skin: Warm. Dry. Good turgor. No rash. No vasculitic stigmata.  Psychiatric: Appropriate affect and mood for situation.       Laboratory Studies--  CBC                        9.9    3.91  )-----------( 128      ( 12 Jul 2020 07:44 )             31.2       Chemistries  07-12    140  |  104  |  19  ----------------------------<  100<H>  4.1   |  31  |  1.00    Ca    8.6      12 Jul 2020 07:44  Phos  3.2     07-12  Mg     2.0     07-12        Culture Data    Culture - Blood (collected 09 Jul 2020 08:57)  Source: .Blood Blood  Preliminary Report (10 Jul 2020 09:01):    No growth to date.    Culture - Blood (collected 09 Jul 2020 08:57)  Source: .Blood Blood  Preliminary Report (10 Jul 2020 09:01):    No growth to date.    Culture - Tissue with Gram Stain (collected 09 Jul 2020 00:26)  Source: .Tissue LEFT TIBIA #2  Gram Stain (prelim) (09 Jul 2020 22:49):    Few polymorphonuclear leukocytes seen per low power field    No organisms seen per oil power field  Preliminary Report (10 Jul 2020 19:48):    Rare Salmonella species See previous culture [id number]    Culture - Abscess with Gram Stain (collected 09 Jul 2020 00:24)  Source: .Abscess LEFT TIBIAL ABSCESS  Preliminary Report (09 Jul 2020 20:59):    No growth    Culture - Tissue with Gram Stain (collected 09 Jul 2020 00:21)  Source: .Tissue LEFT TIBIA #3  Gram Stain (prelim) (09 Jul 2020 21:15):    Few polymorphonuclear leukocytes seen per low power field    No organisms seen per oil power field  Preliminary Report (09 Jul 2020 21:15):    No growth    Culture - Tissue with Gram Stain (collected 09 Jul 2020 00:19)  Source: .Tissue LEFT TIBIA #1  Gram Stain (prelim) (09 Jul 2020 22:50):    Few polymorphonuclear leukocytes seen per low power field    No organisms seen per oil power field  Preliminary Report (10 Jul 2020 19:42):    Rare Salmonella species  Organism: Salmonella species (10 Jul 2020 19:41)  Organism: Salmonella species (10 Jul 2020 19:41)

## 2020-07-13 NOTE — DISCHARGE NOTE NURSING/CASE MANAGEMENT/SOCIAL WORK - NSDCFUADDAPPT_GEN_ALL_CORE_FT
Follow up with your surgeon in two weeks. Call for appointment.  If you need more pain medication, call your surgeon's office. For medication refills or authorizations, please call [phone]    Follow up with Dr Zhao, associate of Dr. Albarran the infectious disease doctor that saw you in the hospital.     Follow up with your cardiologist for INR check and to schedule OMAYRA (transesophageal echocardiogram) to assess aortic stenosis severity.     We recommend that you call and schedule a follow up appointment with your primary care physician for repeat blood work (CBC and BMP) for post hospital discharge follow-up care.  Call your surgeon if you have increased redness/pain/drainage or fever. Return to ER for shortness of breath/calf tenderness.

## 2020-07-13 NOTE — PROGRESS NOTE ADULT - ATTENDING COMMENTS
If any ID input is needed over the weekend, please call [phone].    Quentin العراقي MD  Attending Physician  [facility]  Division of Infectious Diseases  [phone]
Agree with above note and plan.
D/W Dr. Oshea.    Quentin العراقي MD  Attending Physician  [facility]  Division of Infectious Diseases  [phone]

## 2020-07-13 NOTE — DISCHARGE NOTE NURSING/CASE MANAGEMENT/SOCIAL WORK - PATIENT PORTAL LINK FT
You can access the FollowMyHealth Patient Portal offered by [facility] by registering at the following website: [email or web address]. By joining [employer]’s FollowMyHealth portal, you will also be able to view your health information using other applications (apps) compatible with our system.

## 2020-07-13 NOTE — PROGRESS NOTE ADULT - PROVIDER SPECIALTY LIST ADULT
Cardiology
Cardiology
Critical Care
Hospitalist
Infectious Disease
Orthopedics
Infectious Disease

## 2020-07-13 NOTE — PROGRESS NOTE ADULT - SUBJECTIVE AND OBJECTIVE BOX
Progress note   Patient is 84y y/o Male s/p RIKI Tibial nail POD #5  Patient is seen and examined in chair.  No acute complaints  Pain is controlled.  Denies CP/SOB/Dizziness/N/V/D/HA.     Vital Signs Last 24 Hrs  T(C): 36.9 (13 Jul 2020 05:00), Max: 37.1 (12 Jul 2020 12:29)  T(F): 98.5 (13 Jul 2020 05:00), Max: 98.7 (12 Jul 2020 12:29)  HR: 92 (13 Jul 2020 05:00) (73 - 92)  BP: 149/85 (13 Jul 2020 05:00) (121/63 - 155/75)  BP(mean): --  RR: 17 (13 Jul 2020 05:00) (17 - 18)  SpO2: 95% (13 Jul 2020 05:00) (95% - 97%)      PHYSICAL EXAM:  General: A&Ox3 NAD, Sitting upright in chair  Left LE: Prineo dressing over knee CDI. Proximal Tibial prineo dressing CDI. +hematoma proximal Tibia. Motor intact +EHL/FHL/TA/GS. Sensation is grossly intact. Extremity warm, compartments soft, compressible. No calf tenderness. DP2+    Right LE: Motor intact + EHL/FHL/TA/GS.  Sensation is grossly intact.  Extremity warm, compartments soft, compressible. No calf tenderness. DP 2+       Labs:                          9.9    3.91  )-----------( 128      ( 12 Jul 2020 07:44 )             31.2       07-12    140  |  104  |  19  ----------------------------<  100<H>  4.1   |  31  |  1.00    Ca    8.6      12 Jul 2020 07:44  Phos  3.2     07-12  Mg     2.0     07-12        A/P: Patient is a 84y y/o Male s/p RIKI Tibial nail POD #5  -wound care, knee extension/leg elevation, cryocuff, isometric exercises, new medications reviewed with pt  -Pain control/analgesia  -Inc spirometry reviewed with pt, demonstrated competence  -DVT prophylaxis with Coumadin/Lovenox bridge/SCD's  -PT/OT/WBAT  F/U OR Cx: Rare salmonella species. F/U Bld Cx: NGTD  ID rec for Cipro for d/c as per medicine  Cardio rec appreciated: Needs OMAYRA, can be done outpatient.  -DC planning: Today pending home care Progress note   Patient is 84y y/o Male s/p RIKI Tibial nail POD #5  Patient is seen and examined in chair.  No acute complaints  Pain is controlled.  Denies CP/SOB/Dizziness/N/V/D/HA.     Vital Signs Last 24 Hrs  T(C): 36.9 (13 Jul 2020 05:00), Max: 37.1 (12 Jul 2020 12:29)  T(F): 98.5 (13 Jul 2020 05:00), Max: 98.7 (12 Jul 2020 12:29)  HR: 92 (13 Jul 2020 05:00) (73 - 92)  BP: 149/85 (13 Jul 2020 05:00) (121/63 - 155/75)  BP(mean): --  RR: 17 (13 Jul 2020 05:00) (17 - 18)  SpO2: 95% (13 Jul 2020 05:00) (95% - 97%)      PHYSICAL EXAM:  General: A&Ox3 NAD, Sitting upright in chair  Left LE: Prineo dressing over knee CDI. Proximal Tibial prineo dressing CDI. +hematoma proximal Tibia. Motor intact +EHL/FHL/TA/GS. Sensation is grossly intact. Extremity warm, compartments soft, compressible. No calf tenderness. DP2+    Right LE: Motor intact + EHL/FHL/TA/GS.  Sensation is grossly intact.  Extremity warm, compartments soft, compressible. No calf tenderness. DP 2+       Labs:                          9.9    3.91  )-----------( 128      ( 12 Jul 2020 07:44 )             31.2       07-12    140  |  104  |  19  ----------------------------<  100<H>  4.1   |  31  |  1.00    Ca    8.6      12 Jul 2020 07:44  Phos  3.2     07-12  Mg     2.0     07-12        A/P: Patient is a 84y y/o Male s/p RIKI Tibial nail POD #5  -wound care, knee extension/leg elevation, cryocuff, isometric exercises, new medications reviewed with pt  -Pain control/analgesia  -Inc spirometry reviewed with pt, demonstrated competence  -DVT prophylaxis with Coumadin/Lovenox bridge/SCD's  -PT/OT/WBAT  F/U OR Cx: Rare salmonella species. F/U Bld Cx: NGTD  ID rec for PO Levofloxacin x 6weeks   Cardio rec appreciated: Needs OMAYRA, can be done outpatient.  -DC planning: Today pending home care
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
## 2020-07-16 DIAGNOSIS — R00.1 BRADYCARDIA, UNSPECIFIED: ICD-10-CM

## 2020-07-16 DIAGNOSIS — I08.2 RHEUMATIC DISORDERS OF BOTH AORTIC AND TRICUSPID VALVES: ICD-10-CM

## 2020-07-16 DIAGNOSIS — D47.2 MONOCLONAL GAMMOPATHY: ICD-10-CM

## 2020-07-16 DIAGNOSIS — A02.24: ICD-10-CM

## 2020-07-16 DIAGNOSIS — I49.3 VENTRICULAR PREMATURE DEPOLARIZATION: ICD-10-CM

## 2020-07-16 DIAGNOSIS — I44.0 ATRIOVENTRICULAR BLOCK, FIRST DEGREE: ICD-10-CM

## 2020-07-16 DIAGNOSIS — T84.623A INFECTION AND INFLAMMATORY REACTION DUE TO INTERNAL FIXATION DEVICE OF LEFT TIBIA, INITIAL ENCOUNTER: ICD-10-CM

## 2020-07-16 DIAGNOSIS — I95.81 POSTPROCEDURAL HYPOTENSION: ICD-10-CM
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
## 2020-07-16 DIAGNOSIS — M19.90 UNSPECIFIED OSTEOARTHRITIS, UNSPECIFIED SITE: ICD-10-CM

## 2020-07-16 DIAGNOSIS — Z86.718 PERSONAL HISTORY OF OTHER VENOUS THROMBOSIS AND EMBOLISM: ICD-10-CM

## 2020-07-16 DIAGNOSIS — E83.39 OTHER DISORDERS OF PHOSPHORUS METABOLISM: ICD-10-CM

## 2020-07-16 DIAGNOSIS — N40.0 BENIGN PROSTATIC HYPERPLASIA WITHOUT LOWER URINARY TRACT SYMPTOMS: ICD-10-CM

## 2020-07-16 DIAGNOSIS — I10 ESSENTIAL (PRIMARY) HYPERTENSION: ICD-10-CM

## 2020-07-29 ENCOUNTER — APPOINTMENT (OUTPATIENT)
Dept: CARDIOLOGY | Facility: CLINIC | Age: 85
End: 2020-07-29
Payer: MEDICARE

## 2020-07-29 ENCOUNTER — NON-APPOINTMENT (OUTPATIENT)
Age: 85
End: 2020-07-29

## 2020-07-29 VITALS
WEIGHT: 180 LBS | SYSTOLIC BLOOD PRESSURE: 166 MMHG | TEMPERATURE: 98 F | DIASTOLIC BLOOD PRESSURE: 86 MMHG | BODY MASS INDEX: 27.28 KG/M2 | HEART RATE: 85 BPM | RESPIRATION RATE: 12 BRPM | HEIGHT: 68 IN

## 2020-07-29 PROCEDURE — 93000 ELECTROCARDIOGRAM COMPLETE: CPT

## 2020-07-29 PROCEDURE — 99214 OFFICE O/P EST MOD 30 MIN: CPT

## 2020-07-29 NOTE — DISCUSSION/SUMMARY
[FreeTextEntry1] : 1. Check 2 week event monitor to evaluate for higher degree heart block.\par 2. Consider echocardiogram in the next 6 months to reevaluate his aortic stenosis. He will let me know if he has any symptoms.\par 3. Continue current cardiac meds in doses as noted above for hypertension, dyslipidemia.\par 4. Continue anticoagulation for DVT.\par 5. Monitor BP at home, keep a log and bring to f/u.\par 6. Encourage him to be as active as possible.\par 7. Follow up here in one month.

## 2020-07-29 NOTE — ASSESSMENT
[FreeTextEntry1] : EKG: Sinus rhythm with atypical left bundle branch block.  1st degree AVB.\par \par 85-year-old man with a past medical history of hypertension, dyslipidemia, AS who presents to me for f/u.  Patient successfully had his surgery without issue but postoperatively had some issues of bradycardia and dizziness. Per the hospital record she'll never had a first degree heart block and no evidence of higher degree heart block. His beta blocker was able to be continued. Since being discharged he is feeling well with no further dizziness. His EKG continues to show what appears to be an atypical left bundle branch block and first degree heart block. I will have him do a heart monitor to look for more higher degree heart block. Given his lack of symptoms at this time there is no indication for a pacemaker. Echocardiogram in the hospital did show at least moderate aortic stenosis with very high peak gradients. For now this appears to be asymptomatic I will hold off on further workup or intervention but will consider a short-term followup echo in the next 6 months. Blood pressure is elevated here today but apparently is better controlled at home and he will be more careful about monitoring at home and bring a list when he comes back in followup.

## 2020-07-29 NOTE — PHYSICAL EXAM
[General Appearance - In No Acute Distress] : no acute distress [Normal Conjunctiva] : the conjunctiva exhibited no abnormalities [Normal Oral Mucosa] : normal oral mucosa [Auscultation Breath Sounds / Voice Sounds] : lungs were clear to auscultation bilaterally [Abdomen Soft] : soft [Abdomen Tenderness] : non-tender [Cyanosis, Localized] : no localized cyanosis [Nail Clubbing] : no clubbing of the fingernails [Affect] : the affect was normal [Oriented To Time, Place, And Person] : oriented to person, place, and time [Normal Rate] : normal [Rhythm Regular] : regular [Normal S1] : normal S1 [Normal S2] : normal S2 [S4] : an S4 was heard [III] : a grade 3 [FreeTextEntry1] : No JVD, no carotid bruits. [S3] : no S3

## 2020-07-29 NOTE — HISTORY OF PRESENT ILLNESS
[FreeTextEntry1] : Patient presents back to the office today having successfully had his surgery for removal of the navin in his leg. He apparently was found to have Salmonella in his blood and is currently on 6 weeks of antibiotics. Postoperatively he had some issues with dizziness and lightheadedness as well as bradycardia. He was found to have a first degree heart block which he has had previously. There appears to have been no evidence of higher degree heart block. He was monitored for one day in the ICU and spent a couple of days in the hospital before being discharged home. Since being home he is feeling better and has had no further issues with dizziness or lightheadedness. He says when the blood pressure checked at home and has been in the 130s over 70s. He reports dyspnea on exertion when going up a flight or 2 of stairs but this continues to be stable. No other new symptoms. Patient denies chest pain, palpitations, orthopnea, presyncope, syncope.

## 2020-07-31 ENCOUNTER — APPOINTMENT (OUTPATIENT)
Dept: CARDIOLOGY | Facility: CLINIC | Age: 85
End: 2020-07-31

## 2020-09-09 RX ORDER — KIT FOR THE PREPARATION OF TECHNETIUM TC99M SESTAMIBI 1 MG/5ML
INJECTION, POWDER, LYOPHILIZED, FOR SOLUTION PARENTERAL
Refills: 0 | Status: COMPLETED | OUTPATIENT
Start: 2020-09-09

## 2020-09-09 RX ADMIN — KIT FOR THE PREPARATION OF TECHNETIUM TC99M SESTAMIBI 0: 1 INJECTION, POWDER, LYOPHILIZED, FOR SOLUTION PARENTERAL at 00:00

## 2020-09-18 ENCOUNTER — NON-APPOINTMENT (OUTPATIENT)
Age: 85
End: 2020-09-18

## 2020-09-18 ENCOUNTER — APPOINTMENT (OUTPATIENT)
Dept: CARDIOLOGY | Facility: CLINIC | Age: 85
End: 2020-09-18
Payer: MEDICARE

## 2020-09-18 VITALS
SYSTOLIC BLOOD PRESSURE: 127 MMHG | WEIGHT: 178 LBS | HEART RATE: 74 BPM | HEIGHT: 68 IN | DIASTOLIC BLOOD PRESSURE: 80 MMHG | BODY MASS INDEX: 26.98 KG/M2 | RESPIRATION RATE: 16 BRPM | TEMPERATURE: 97.9 F

## 2020-09-18 PROCEDURE — 99214 OFFICE O/P EST MOD 30 MIN: CPT

## 2020-09-18 PROCEDURE — 93000 ELECTROCARDIOGRAM COMPLETE: CPT

## 2020-09-18 NOTE — ASSESSMENT
[FreeTextEntry1] : EKG: Sinus rhythm with LAFB.  1st degree AVB.\par \par 85-year-old man with a past medical history of hypertension, dyslipidemia, AS who presents to me for f/u.  Patient appears to be reasonably stable with no evidence of acute heart failure at this time. There is no obvious symptomatology of aortic stenosis at this time. He does have some fatigue on exertion and it is unclear whether that could be related. He is not a candidate for exercise treadmill test. Review of the echocardiogram from the hospital in July shows Doppler numbers that are more consistent with severe aortic stenosis and moderate aortic stenosis. I have asked him to watch for any evidence of symptoms very closely. Blood pressure is somewhat borderline at this time. EKG is unchanged with a bifascicular block.

## 2020-09-18 NOTE — DISCUSSION/SUMMARY
[FreeTextEntry1] : 1. Check 2 week event monitor to evaluate for higher degree heart block.\par 2. Consider echocardiogram in the next 4 months to reevaluate his aortic stenosis. He will let me know if he has any symptoms.\par 3. Increase amlodipine to 10 mg daily.  Continue other current cardiac meds in doses as noted above for hypertension, dyslipidemia.\par 4. Continue anticoagulation for DVT.\par 5. Monitor BP at home, keep a log and bring to f/u.\par 6. Encourage him to be as active as possible.\par 7. Follow up here in 2 months.

## 2020-09-18 NOTE — PHYSICAL EXAM
[General Appearance - In No Acute Distress] : no acute distress [Normal Conjunctiva] : the conjunctiva exhibited no abnormalities [Normal Oral Mucosa] : normal oral mucosa [Auscultation Breath Sounds / Voice Sounds] : lungs were clear to auscultation bilaterally [Normal Rate] : normal [Rhythm Regular] : regular [Normal S1] : normal S1 [Normal S2] : normal S2 [S4] : an S4 was heard [III] : a grade 3 [Abdomen Soft] : soft [Abdomen Tenderness] : non-tender [Nail Clubbing] : no clubbing of the fingernails [Cyanosis, Localized] : no localized cyanosis [Oriented To Time, Place, And Person] : oriented to person, place, and time [Affect] : the affect was normal [S3] : no S3 [FreeTextEntry1] : 1+ LLE edema, no R edema

## 2020-09-18 NOTE — HISTORY OF PRESENT ILLNESS
[FreeTextEntry1] : Patient presents back to the office today feeling about the same as last I saw him. He still has some fatigue with exertion of 2-3 blocks but this has not changed recently. He does not report any real shortness of breath at all. He never received the event monitor and did not have that done. Blood pressures at home in August were in the 120s to 130s over 70s to 80s. He is still having some problems with his left hip but the leg seems to be healing. Patient denies chest pain, palpitations, orthopnea, presyncope, syncope.

## 2020-11-17 ENCOUNTER — APPOINTMENT (OUTPATIENT)
Dept: CARDIOLOGY | Facility: CLINIC | Age: 85
End: 2020-11-17
Payer: MEDICARE

## 2020-11-17 VITALS
HEIGHT: 68 IN | RESPIRATION RATE: 16 BRPM | SYSTOLIC BLOOD PRESSURE: 177 MMHG | WEIGHT: 178 LBS | TEMPERATURE: 97.7 F | DIASTOLIC BLOOD PRESSURE: 87 MMHG | BODY MASS INDEX: 26.98 KG/M2 | HEART RATE: 74 BPM

## 2020-11-17 PROCEDURE — 99214 OFFICE O/P EST MOD 30 MIN: CPT

## 2020-11-17 NOTE — ASSESSMENT
[FreeTextEntry1] : 85-year-old man with a past medical history of hypertension, dyslipidemia, AS, DVT who presents to me for f/u.  Patient appears to be reasonably stable with no evidence of acute heart failure at this time. There is no obvious symptomatology of aortic stenosis at this time. His event monitor shows some second-degree heart block and some 2-1 heart block but this is relatively brief and apparently asymptomatic. It frequently occurs while he is sleeping. He otherwise has mostly first-degree heart block. At this time there is no indication for pacemaker. He will need repeat echocardiogram to reevaluate the severity of his aortic stenosis. When he ultimately requires TAVR he would likely need a pacemaker at that time but I will hold off on any further intervention at this time given his lack of symptoms. Blood pressure appears to be adequately controlled.

## 2020-11-17 NOTE — DISCUSSION/SUMMARY
[FreeTextEntry1] : 1. Echocardiogram prior to follow-up to reevaluate his aortic stenosis. He will let me know if he has any symptoms.\par 2. Continue current cardiac meds in doses as noted above for hypertension, dyslipidemia.\par 3. Continue anticoagulation for DVT. INR is monitored through his primary.\par 4. Monitor BP at home, keep a log and bring to f/u.\par 5. Encourage him to be as active as possible.\par 6. Follow up here in 3 months.

## 2020-11-17 NOTE — PHYSICAL EXAM
[General Appearance - In No Acute Distress] : no acute distress [Normal Conjunctiva] : the conjunctiva exhibited no abnormalities [Normal Oral Mucosa] : normal oral mucosa [Auscultation Breath Sounds / Voice Sounds] : lungs were clear to auscultation bilaterally [Abdomen Soft] : soft [Abdomen Tenderness] : non-tender [Nail Clubbing] : no clubbing of the fingernails [Cyanosis, Localized] : no localized cyanosis [Oriented To Time, Place, And Person] : oriented to person, place, and time [Affect] : the affect was normal [Normal Rate] : normal [Rhythm Regular] : regular [Normal S1] : normal S1 [Normal S2] : normal S2 [S3] : no S3 [S4] : an S4 was heard [III] : a grade 3 [FreeTextEntry1] : 1+ LLE edema, no R edema

## 2021-02-22 ENCOUNTER — APPOINTMENT (OUTPATIENT)
Dept: CARDIOLOGY | Facility: CLINIC | Age: 86
End: 2021-02-22
Payer: MEDICARE

## 2021-02-22 PROCEDURE — 93306 TTE W/DOPPLER COMPLETE: CPT

## 2021-03-04 ENCOUNTER — NON-APPOINTMENT (OUTPATIENT)
Age: 86
End: 2021-03-04

## 2021-03-04 ENCOUNTER — APPOINTMENT (OUTPATIENT)
Dept: CARDIOLOGY | Facility: CLINIC | Age: 86
End: 2021-03-04
Payer: MEDICARE

## 2021-03-04 VITALS
RESPIRATION RATE: 12 BRPM | HEIGHT: 68 IN | TEMPERATURE: 97.9 F | WEIGHT: 180 LBS | BODY MASS INDEX: 27.28 KG/M2 | DIASTOLIC BLOOD PRESSURE: 76 MMHG | HEART RATE: 81 BPM | SYSTOLIC BLOOD PRESSURE: 150 MMHG

## 2021-03-04 PROCEDURE — 93000 ELECTROCARDIOGRAM COMPLETE: CPT

## 2021-03-04 PROCEDURE — 99214 OFFICE O/P EST MOD 30 MIN: CPT

## 2021-03-04 NOTE — ASSESSMENT
[FreeTextEntry1] : Echocardiogram February 22, 2021 demonstrated left ventricle normal size and function with ejection fraction of 55 to 60%.  Mild concentric LVH.  Mildly dilated left atrium.  Moderate to severe aortic stenosis.  Trace mitral, mild aortic and mild tricuspid regurgitation.  Mild dilatation of the aortic root and ascending aorta.\par \par EKG: Sinus rhythm with first-degree AV block.  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  Single PVC.\par \par 85-year-old man with a past medical history of hypertension, dyslipidemia, AS, DVT who presents to me for f/u.  Patient appears to be reasonably stable with no evidence of acute heart failure at this time. There is no obvious symptomatology of aortic stenosis at this time.  Echocardiogram shows moderate to severe aortic stenosis but normal EF and normal LV size.  There is no overt indication for aortic valve replacement at this time.  He is asymptomatic with regards to his heart blocks and at this time there is no overt indication for pacemaker.  However if the patient does wish to consider hip replacement surgery I would recommend having the valve replaced and likely pacemaker placed prior to the surgery.  He will think about this.  I have given him the name of an electrophysiologist and surgeon he can follow-up with to discuss.

## 2021-03-04 NOTE — HISTORY OF PRESENT ILLNESS
[FreeTextEntry1] : Patient presents back today feeling well and offering no complaints.  He tries to remain reasonably active and reports no significant symptoms when he does so other than issues with his hip.  He would like to consider having a hip replacement but per his wife he continues putting it off.  He reports no dizziness or lightheadedness at all.  Patient denies chest pain, shortness of breath, palpitations, orthopnea, presyncope, syncope.

## 2021-03-04 NOTE — DISCUSSION/SUMMARY
[FreeTextEntry1] : 1. No additional cardiac testing at this time.\par 2. Continue current cardiac meds in doses as noted above for hypertension, dyslipidemia.\par 3. Continue anticoagulation for DVT. INR is monitored through his primary.\par 4. Monitor BP at home, keep a log and bring to f/u.\par 5. I have referred him to electrophysiology to discuss his heart block issues and eventual need for pacemaker and a surgeon to discuss aortic valve replacement options.\par 6. Encourage him to be as active as possible.\par 7. Follow up here in 3 months.

## 2021-04-06 ENCOUNTER — APPOINTMENT (OUTPATIENT)
Dept: CARDIOTHORACIC SURGERY | Facility: CLINIC | Age: 86
End: 2021-04-06
Payer: MEDICARE

## 2021-04-06 ENCOUNTER — NON-APPOINTMENT (OUTPATIENT)
Age: 86
End: 2021-04-06

## 2021-04-06 VITALS
HEIGHT: 68 IN | DIASTOLIC BLOOD PRESSURE: 94 MMHG | RESPIRATION RATE: 16 BRPM | SYSTOLIC BLOOD PRESSURE: 162 MMHG | OXYGEN SATURATION: 94 % | HEART RATE: 88 BPM | WEIGHT: 180 LBS | BODY MASS INDEX: 27.28 KG/M2

## 2021-04-06 PROCEDURE — 99205 OFFICE O/P NEW HI 60 MIN: CPT

## 2021-04-06 NOTE — HISTORY OF PRESENT ILLNESS
[FreeTextEntry1] : Mr. PRADO is a 85 year old male referred by Dr. Harrington who presents for consultation. His past medical history includes HLD, BPH, DVT (Warfarin), HTN, Trifascicular block, and aortic stenosis. \par \par Mr Prado is in need of a hip replacement and has been putting it off. As per cardiology he will likely need a pacemaker prior to his hip intervention. He is here today for evaluation of TAVR candidacy.\par

## 2021-04-06 NOTE — CONSULT LETTER
[Dear  ___] : Dear  [unfilled], [Courtesy Letter:] : I had the pleasure of seeing your patient, [unfilled], in my office today. [Please see my note below.] : Please see my note below. [Consult Closing:] : Thank you very much for allowing me to participate in the care of this patient.  If you have any questions, please do not hesitate to contact me. [Sincerely,] : Sincerely, [FreeTextEntry2] : Taz Harrington MD [FreeTextEntry3] : Minh Fung MD\par  of Cardiothoracic Surgery\par [facility]\par [address] [place] [address] \[place] [address]\par [phone]\par

## 2021-04-06 NOTE — DATA REVIEWED
[FreeTextEntry1] : Transthoracic Echocardiogram from 2/22/21 at Cardiology office of Dr Harrington\par - LVEF 55-60%\par - Mild to moderate MAC mild thickening and calcification of the anterior and posterior mitral valve leaflets, trace mitral valve regurgitation,.\par - Moderate to severe aortic stenosis, mild aortic regurgitation JANE 1.28 cm2, MAVG 31 mmHg, PAVG 68.6 mmHg\par - Mild dilation of the aortic root (3.8 cm) and ascending aorta (3.9 cm)

## 2021-04-14 NOTE — CONSULT LETTER
[Dear  ___] : Dear  [unfilled], [Consult Letter:] : I had the pleasure of evaluating your patient, [unfilled]. [Please see my note below.] : Please see my note below. [Consult Closing:] : Thank you very much for allowing me to participate in the care of this patient.  If you have any questions, please do not hesitate to contact me. [Sincerely,] : Sincerely, [FreeTextEntry2] : Taz Harrington MD [FreeTextEntry3] : Minh Fung MD\par  of Cardiothoracic Surgery\par [facility]\par [address] [place] [address] \[place] [address]\par [phone]\par

## 2021-04-14 NOTE — DATA REVIEWED
[FreeTextEntry1] : 2.22.21 Transthoracic Echocardiogram at [facility] \par  -Left ventricular ejection fraction, by visual estimation is 55% -60% \par -Normal global left ventricular systolic function  \par -Impaired relaxation pattern of LV diastolic filling \par -Normal right ventricular size and structure  \par - Mildly dilated left atrium \par -The right atrium is normal in size and structure \par -Mild thickening and calcification of the anterior and posterior mitral valve leaflet \par -Trace mild valve regurgitation \par -Moderate severe aortic aortic valve stenosis \par -Mild aortic regurgitation \par -Mild tricuspid regurgitation \par -Trace pulmonic valve regurgitation \par -There is mild dilation of the aortic root and ascending aorta \par -There is no evidence of pericardial effusion \par -Recommend clinical correlation with the above finding \par

## 2021-04-14 NOTE — ASSESSMENT
[FreeTextEntry1] : Risks benefits and alternatives to transcatheter aortic valve placement were discussed with the patient in detail. Risks discussed included, but not limited to infection, bleeding, myocardial infarction, cerebrovascular accident, renal failure, vascular injury requiring intervention, cardiac rupture, and death. In addition, a roughly 5-10% risk of permanent pacemaker requirement was highlighted. He was diagnosed with trifascicular block and I am concerned that this will cause a problem post operatively. He will be evaluated by Electrophysiology shortly for PPM placement. I am requesting that if a pacemaker may be required that he have it placed at least 1-2 weeks prior to TAVR please. \par \par He will need Cardiac Catheterization, repeat echocardiogram, and CTA (TAVR protocol) prior to TAVR. \par \par \par PLAN:\par - Pacemaker evaluation prior to valve replacement \par - Cardiac Catheterization\par - CTA chest abdomen pelvis (TAVR Protocol)\par - Repeat Transthoracic Echocardiogram\par \par \par \par \par \par \par I, Lion Jean-Baptiste NP am scribing for and in the presence of Dr. Fung the following sections HISTORY OF PRESENT ILLNESS, PAST MEDICAL/FAMILY/SOCIAL HISTORY; REVIEW OF SYSTEMS; VITAL SIGNS; PHYSICAL EXAM; DISPOSITION.\par \par "I personally performed the services described in the documentation, reviewed the documentation recorded by the scribe in my presence and accurately and completely records my words and actions."\par

## 2021-04-14 NOTE — REVIEW OF SYSTEMS
[Feeling Poorly] : feeling poorly [Feeling Tired] : feeling tired [Shortness Of Breath] : shortness of breath [SOB on Exertion] : shortness of breath during exertion [Joint Pain] : joint pain [Joint Stiffness] : joint stiffness [Negative] : Heme/Lymph [Chest Pain] : no chest pain [Palpitations] : no palpitations [Lower Ext Edema] : no extremity edema [Orthopnea] : no orthopnea

## 2021-04-14 NOTE — HISTORY OF PRESENT ILLNESS
[FreeTextEntry1] : Mr. PRADO is a 85 year old male referred by Dr. Harrington who presents for consultation. His past medical history includes HLD, BPH, DVT (Warfarin), HTN, Trifascicular block, and aortic stenosis. \par \par Mr Prado is in need of a hip replacement and has been putting it off. As per cardiology he will likely need a pacemaker prior to his hip intervention. He is here today for evaluation of TAVR candidacy.\par \par Reports shortness of breath with stairs, occasional lower extremity edema and dizziness\par \par NYHAC II  \par Lives in private home\par Independent with ADL\par No home health aid

## 2021-04-15 ENCOUNTER — NON-APPOINTMENT (OUTPATIENT)
Age: 86
End: 2021-04-15

## 2021-04-15 ENCOUNTER — APPOINTMENT (OUTPATIENT)
Dept: ELECTROPHYSIOLOGY | Facility: CLINIC | Age: 86
End: 2021-04-15
Payer: MEDICARE

## 2021-04-15 VITALS
DIASTOLIC BLOOD PRESSURE: 70 MMHG | RESPIRATION RATE: 16 BRPM | HEIGHT: 68 IN | SYSTOLIC BLOOD PRESSURE: 150 MMHG | HEART RATE: 80 BPM | OXYGEN SATURATION: 94 % | TEMPERATURE: 98.2 F | WEIGHT: 182 LBS | BODY MASS INDEX: 27.58 KG/M2

## 2021-04-15 DIAGNOSIS — Z82.3 FAMILY HISTORY OF STROKE: ICD-10-CM

## 2021-04-15 DIAGNOSIS — Z80.0 FAMILY HISTORY OF MALIGNANT NEOPLASM OF DIGESTIVE ORGANS: ICD-10-CM

## 2021-04-15 DIAGNOSIS — Z80.42 FAMILY HISTORY OF MALIGNANT NEOPLASM OF PROSTATE: ICD-10-CM

## 2021-04-15 PROCEDURE — 99205 OFFICE O/P NEW HI 60 MIN: CPT

## 2021-04-15 PROCEDURE — 93000 ELECTROCARDIOGRAM COMPLETE: CPT

## 2021-04-15 RX ORDER — TAMSULOSIN HYDROCHLORIDE 0.4 MG/1
0.4 CAPSULE ORAL
Refills: 0 | Status: ACTIVE | COMMUNITY

## 2021-04-15 RX ORDER — ELECTROLYTES/DEXTROSE
SOLUTION, ORAL ORAL
Refills: 0 | Status: ACTIVE | COMMUNITY
Start: 2021-04-15

## 2021-04-15 RX ORDER — CHOLECALCIFEROL (VITAMIN D3) 50 MCG
50 MCG TABLET ORAL
Refills: 0 | Status: ACTIVE | COMMUNITY
Start: 2021-04-15

## 2021-04-15 NOTE — DISCUSSION/SUMMARY
[FreeTextEntry1] :  85-year-old gentleman with history of HTN, DVT on warfarin s/p IVC filter, prior cardiac surgery for filter retrieval, aortic stenosis, and conduction disease presenting for evaluation for possible pacemaker implant. HE has had progressive infraHisian conduction disease noted on ECG, now with RBBB, LAFB and first degree AVB. On event monitor, he had episodes of high grade AV block. Though he has not been symptomatic with AV block thus far, there does appear to be high risk for further progressive AV block and associated morbidity. Furthermore, he is planned for TAVR procedure, and with this will be at very high risk for more complete AV block, which can occur up to weeks following the procedure. Given this, I do think he would benefit from pacemaker implant, prior to TAVR. We discussed ppm implant in detail, including procedure related risks such as bleeding, vascular injury, cardiac perforation and infection. He does have a significant history of infection related to implanted LE hardware, however, the hardware has been removed, and he has not had infection for >2 yrs. A leadless ppm was considered, but may be infeasible given his IVC filter, which has been in place for several years. \par \par He expressed understanding, and does want to proceed with transvenous ppm implant. \par \par  -dual chamber ppm implant.  \par \par -Can continue coumadin, with goal INR around 2 or less for the procedure.  Given low risk of significant clotting at this point, plan to hold coumadin for 3 days prior to ppm implant, and will then restart anticoagulation without bridging.  \par \par

## 2021-04-15 NOTE — REASON FOR VISIT
[Consultation] : a consultation regarding [Pacemaker Evaluation] : pacemaker ~T evaluation ~C was performed [FreeTextEntry1] : ref Dr Harrington, and Dr. Fung [Spouse] : spouse
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
## 2021-04-15 NOTE — PHYSICAL EXAM
[General Appearance - Well Developed] : well developed [General Appearance - Well Nourished] : well nourished [General Appearance - In No Acute Distress] : no acute distress [Normal Conjunctiva] : the conjunctiva exhibited no abnormalities [Normal Oral Mucosa] : normal oral mucosa [Heart Rate And Rhythm] : heart rate and rhythm were normal [Heart Sounds] : normal S1 and S2 [Edema] : no peripheral edema present [] : no respiratory distress [Auscultation Breath Sounds / Voice Sounds] : lungs were clear to auscultation bilaterally [Abdomen Soft] : soft [Abdomen Tenderness] : non-tender [Nail Clubbing] : no clubbing of the fingernails [Cyanosis, Localized] : no localized cyanosis [FreeTextEntry1] : 1+ LLE edema, no R edema [Oriented To Time, Place, And Person] : oriented to person, place, and time [Affect] : the affect was normal [Rhythm Regular] : regular [S3] : no S3

## 2021-04-15 NOTE — REVIEW OF SYSTEMS
[Headache] : no headache [Feeling Fatigued] : not feeling fatigued [Shortness Of Breath] : no shortness of breath [Dyspnea on exertion] : dyspnea during exertion [Chest Pain] : no chest pain [Lower Ext Edema] : no extremity edema [Palpitations] : no palpitations [Dizziness] : no dizziness [Confusion] : no confusion was observed [Anxiety] : no anxiety [Easy Bruising] : no tendency for easy bruising [Negative] : Integumentary [FreeTextEntry1] : Patient denies headache, other than noted above full review of systems is otherwise negative.

## 2021-04-27 ENCOUNTER — RESULT REVIEW (OUTPATIENT)
Age: 86
End: 2021-04-27

## 2021-04-27 ENCOUNTER — OUTPATIENT (OUTPATIENT)
Dept: OUTPATIENT SERVICES | Facility: HOSPITAL | Age: 86
LOS: 1 days | End: 2021-04-27
Payer: MEDICARE

## 2021-04-27 DIAGNOSIS — Z98.890 OTHER SPECIFIED POSTPROCEDURAL STATES: Chronic | ICD-10-CM

## 2021-04-27 DIAGNOSIS — Z95.828 PRESENCE OF OTHER VASCULAR IMPLANTS AND GRAFTS: Chronic | ICD-10-CM

## 2021-04-27 DIAGNOSIS — I35.0 NONRHEUMATIC AORTIC (VALVE) STENOSIS: ICD-10-CM

## 2021-04-27 PROCEDURE — 93306 TTE W/DOPPLER COMPLETE: CPT | Mod: 26

## 2021-04-27 PROCEDURE — 93306 TTE W/DOPPLER COMPLETE: CPT

## 2021-04-27 PROCEDURE — 71275 CT ANGIOGRAPHY CHEST: CPT

## 2021-04-27 PROCEDURE — 71275 CT ANGIOGRAPHY CHEST: CPT | Mod: 26,MH

## 2021-04-27 PROCEDURE — 74174 CTA ABD&PLVS W/CONTRAST: CPT

## 2021-04-27 PROCEDURE — 74174 CTA ABD&PLVS W/CONTRAST: CPT | Mod: 26,QQ

## 2021-05-02 ENCOUNTER — TRANSCRIPTION ENCOUNTER (OUTPATIENT)
Age: 86
End: 2021-05-02

## 2021-05-05 ENCOUNTER — OUTPATIENT (OUTPATIENT)
Dept: OUTPATIENT SERVICES | Facility: HOSPITAL | Age: 86
LOS: 1 days | Discharge: HOME | End: 2021-05-05
Payer: MEDICARE

## 2021-05-05 ENCOUNTER — TRANSCRIPTION ENCOUNTER (OUTPATIENT)
Age: 86
End: 2021-05-05

## 2021-05-05 VITALS
SYSTOLIC BLOOD PRESSURE: 162 MMHG | OXYGEN SATURATION: 98 % | HEIGHT: 68 IN | DIASTOLIC BLOOD PRESSURE: 80 MMHG | HEART RATE: 95 BPM | TEMPERATURE: 98 F | WEIGHT: 182.1 LBS | RESPIRATION RATE: 16 BRPM

## 2021-05-05 VITALS
DIASTOLIC BLOOD PRESSURE: 79 MMHG | RESPIRATION RATE: 18 BRPM | SYSTOLIC BLOOD PRESSURE: 137 MMHG | HEART RATE: 80 BPM | OXYGEN SATURATION: 97 %

## 2021-05-05 DIAGNOSIS — Z98.890 OTHER SPECIFIED POSTPROCEDURAL STATES: Chronic | ICD-10-CM

## 2021-05-05 DIAGNOSIS — Z95.828 PRESENCE OF OTHER VASCULAR IMPLANTS AND GRAFTS: Chronic | ICD-10-CM

## 2021-05-05 DIAGNOSIS — E78.2 MIXED HYPERLIPIDEMIA: Chronic | ICD-10-CM

## 2021-05-05 DIAGNOSIS — I35.0 NONRHEUMATIC AORTIC (VALVE) STENOSIS: ICD-10-CM

## 2021-05-05 LAB
ANION GAP SERPL CALC-SCNC: 11 MMOL/L — SIGNIFICANT CHANGE UP (ref 5–17)
APTT BLD: 38.3 SEC — HIGH (ref 27.5–35.5)
BUN SERPL-MCNC: 25 MG/DL — HIGH (ref 8–20)
CALCIUM SERPL-MCNC: 9.7 MG/DL — SIGNIFICANT CHANGE UP (ref 8.6–10.2)
CHLORIDE SERPL-SCNC: 101 MMOL/L — SIGNIFICANT CHANGE UP (ref 98–107)
CO2 SERPL-SCNC: 27 MMOL/L — SIGNIFICANT CHANGE UP (ref 22–29)
CREAT SERPL-MCNC: 0.98 MG/DL — SIGNIFICANT CHANGE UP (ref 0.5–1.3)
GLUCOSE SERPL-MCNC: 131 MG/DL — HIGH (ref 70–99)
HCT VFR BLD CALC: 43.7 % — SIGNIFICANT CHANGE UP (ref 39–50)
HGB BLD-MCNC: 14.5 G/DL — SIGNIFICANT CHANGE UP (ref 13–17)
INR BLD: 1.36 RATIO — HIGH (ref 0.88–1.16)
MAGNESIUM SERPL-MCNC: 2.1 MG/DL — SIGNIFICANT CHANGE UP (ref 1.6–2.6)
MCHC RBC-ENTMCNC: 30.1 PG — SIGNIFICANT CHANGE UP (ref 27–34)
MCHC RBC-ENTMCNC: 33.2 GM/DL — SIGNIFICANT CHANGE UP (ref 32–36)
MCV RBC AUTO: 90.9 FL — SIGNIFICANT CHANGE UP (ref 80–100)
PLATELET # BLD AUTO: 187 K/UL — SIGNIFICANT CHANGE UP (ref 150–400)
POTASSIUM SERPL-MCNC: 3.3 MMOL/L — LOW (ref 3.5–5.3)
POTASSIUM SERPL-SCNC: 3.3 MMOL/L — LOW (ref 3.5–5.3)
PROTHROM AB SERPL-ACNC: 15.5 SEC — HIGH (ref 10.6–13.6)
RBC # BLD: 4.81 M/UL — SIGNIFICANT CHANGE UP (ref 4.2–5.8)
RBC # FLD: 13.2 % — SIGNIFICANT CHANGE UP (ref 10.3–14.5)
SODIUM SERPL-SCNC: 139 MMOL/L — SIGNIFICANT CHANGE UP (ref 135–145)
WBC # BLD: 6.84 K/UL — SIGNIFICANT CHANGE UP (ref 3.8–10.5)
WBC # FLD AUTO: 6.84 K/UL — SIGNIFICANT CHANGE UP (ref 3.8–10.5)

## 2021-05-05 PROCEDURE — 93005 ELECTROCARDIOGRAM TRACING: CPT

## 2021-05-05 PROCEDURE — 85730 THROMBOPLASTIN TIME PARTIAL: CPT

## 2021-05-05 PROCEDURE — 36415 COLL VENOUS BLD VENIPUNCTURE: CPT

## 2021-05-05 PROCEDURE — C1769: CPT

## 2021-05-05 PROCEDURE — 99152 MOD SED SAME PHYS/QHP 5/>YRS: CPT

## 2021-05-05 PROCEDURE — C1887: CPT

## 2021-05-05 PROCEDURE — 85610 PROTHROMBIN TIME: CPT

## 2021-05-05 PROCEDURE — 93458 L HRT ARTERY/VENTRICLE ANGIO: CPT | Mod: 26

## 2021-05-05 PROCEDURE — 80048 BASIC METABOLIC PNL TOTAL CA: CPT

## 2021-05-05 PROCEDURE — C1894: CPT

## 2021-05-05 PROCEDURE — 93458 L HRT ARTERY/VENTRICLE ANGIO: CPT

## 2021-05-05 PROCEDURE — 85027 COMPLETE CBC AUTOMATED: CPT

## 2021-05-05 PROCEDURE — 83735 ASSAY OF MAGNESIUM: CPT

## 2021-05-05 PROCEDURE — 93010 ELECTROCARDIOGRAM REPORT: CPT

## 2021-05-05 RX ORDER — AMLODIPINE BESYLATE 2.5 MG/1
3 TABLET ORAL
Qty: 0 | Refills: 0 | DISCHARGE

## 2021-05-05 RX ORDER — WARFARIN SODIUM 2.5 MG/1
1 TABLET ORAL
Qty: 0 | Refills: 0 | DISCHARGE

## 2021-05-05 RX ORDER — POTASSIUM CHLORIDE 20 MEQ
40 PACKET (EA) ORAL ONCE
Refills: 0 | Status: COMPLETED | OUTPATIENT
Start: 2021-05-05 | End: 2021-05-05

## 2021-05-05 RX ORDER — ASPIRIN/CALCIUM CARB/MAGNESIUM 324 MG
325 TABLET ORAL ONCE
Refills: 0 | Status: COMPLETED | OUTPATIENT
Start: 2021-05-05 | End: 2021-05-05

## 2021-05-05 RX ADMIN — Medication 325 MILLIGRAM(S): at 13:30

## 2021-05-05 RX ADMIN — Medication 40 MILLIEQUIVALENT(S): at 13:30

## 2021-05-05 NOTE — H&P PST ADULT - NSICDXPASTMEDICALHX_GEN_ALL_CORE_FT
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

## 2021-05-05 NOTE — DISCHARGE NOTE NURSING/CASE MANAGEMENT/SOCIAL WORK - PATIENT PORTAL LINK FT
You can access the FollowMyHealth Patient Portal offered by [facility] by registering at the following website: [email or web address]. By joining [employer]’s FollowMyHealth portal, you will also be able to view your health information using other applications (apps) compatible with our system.

## 2021-05-05 NOTE — DISCHARGE NOTE PROVIDER - NSDCMRMEDTOKEN_GEN_ALL_CORE_FT
amLODIPine 10 mg oral tablet: 1 tab(s) orally once a day  aspirin 81 mg oral tablet: 1 tab(s) orally once a day  atorvastatin 20 mg oral tablet: 1 tab(s) orally once a day  irbesartan 300 mg oral tablet: 1 tab(s) orally once a day  metoprolol tartrate 50 mg oral tablet: 1 tab(s) orally 2 times a day  Multiple Vitamins oral tablet: 1 tab(s) orally once a day  tamsulosin 0.4 mg oral capsule: 1 cap(s) orally once a day  Vitamin D3 1000 intl units oral capsule: 1 cap(s) orally once a day  warfarin 1 mg oral tablet: 1  orally every other day  warfarin 5 mg oral tablet: 1 tab(s) orally once a day

## 2021-05-05 NOTE — H&P PST ADULT - PRIMARY CARE PROVIDER
Greyson Hopkins ([facility], [address] [place] [address], [place] [address], [phone])

## 2021-05-05 NOTE — H&P PST ADULT - RS GEN PE MLT RESP DETAILS PC
Anesthesia Evaluation     Patient summary reviewed and Nursing notes reviewed   NPO Solid Status: > 8 hours  NPO Liquid Status: > 2 hours           Airway   Mallampati: II  TM distance: >3 FB  Neck ROM: full  Dental - normal exam     Pulmonary - negative pulmonary ROS and normal exam   Cardiovascular - normal exam    (+) hypertension, valvular problems/murmurs TI, hyperlipidemia,       Neuro/Psych  (+) CVA, headaches, psychiatric history Anxiety and Depression,     GI/Hepatic/Renal/Endo    (+)  GERD,      Musculoskeletal     (+) neck pain,   Abdominal    Substance History - negative use     OB/GYN negative ob/gyn ROS         Other   arthritis, blood dyscrasia,   history of cancer                    Anesthesia Plan    ASA 3     general       Anesthetic plan, all risks, benefits, and alternatives have been provided, discussed and informed consent has been obtained with: patient.       airway patent/breath sounds equal/good air movement/respirations non-labored/clear to auscultation bilaterally

## 2021-05-05 NOTE — DISCHARGE NOTE PROVIDER - CARE PROVIDER_API CALL
Minh Fung)  Surgery; Thoracic and Cardiac Surgery  [address] [place] [address]  Phone: [phone]  Fax: [phone]  Follow Up Time: 2 weeks    Taz Harrington)  [facility] at [address] [address]  Phone: [phone]  Fax: [phone]  Established Patient  Follow Up Time: 2 weeks
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
## 2021-05-05 NOTE — DISCHARGE NOTE PROVIDER - PROVIDER TOKENS
PROVIDER:[TOKEN:[[id number]],FOLLOWUP:[2 weeks]],PROVIDER:[TOKEN:[[id number]],FOLLOWUP:[2 weeks],ESTABLISHEDPATIENT:[T]]
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
## 2021-05-05 NOTE — DISCHARGE NOTE PROVIDER - NSDCCPCAREPLAN_GEN_ALL_CORE_FT
PRINCIPAL DISCHARGE DIAGNOSIS  Diagnosis: Multiple vessel coronary artery disease  Assessment and Plan of Treatment: Wrist precautions explained.  Medications: Aspirin 81 mg daily.  CT surgery evaluation for CABG      SECONDARY DISCHARGE DIAGNOSES  Diagnosis: Severe aortic stenosis  Assessment and Plan of Treatment: CT surgery evaluation for AVR    Diagnosis: History of DVT (deep vein thrombosis)  Assessment and Plan of Treatment: Restart warfarin tonight.    Diagnosis: Essential hypertension  Assessment and Plan of Treatment: Continue metoprolol 50 mg BID  Continue irbesartan 300 mg daily  Continue amlodipine 10 mg daily    Diagnosis: Mixed hyperlipidemia  Assessment and Plan of Treatment: Continue Lipitor 20 mg daily

## 2021-05-05 NOTE — DISCHARGE NOTE PROVIDER - NSDCCPTREATMENT_GEN_ALL_CORE_FT
PRINCIPAL PROCEDURE  Procedure: Left heart catheterization  Findings and Treatment: Multi-vessel CAD
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
## 2021-05-05 NOTE — H&P PST ADULT - OTHER CARE PROVIDERS
Minh Fung ([facility] CT Surgery, [address] [place] [address], Tel: [phone]), Taz Harrington ([facility], Cardiology at [place], [address], [place] [address], [phone]), Rajan Helm ([facility], [address] [facility] [address], [place] [address], Phone: [phone], Fax: [phone])

## 2021-05-05 NOTE — DISCHARGE NOTE PROVIDER - CARE PROVIDERS DIRECT ADDRESSES
[email or web address]

## 2021-05-05 NOTE — H&P PST ADULT - GASTROINTESTINAL DETAILS
The patient is a 67y Female complaining of
soft/nontender/no distention/bowel sounds normal/no bruit
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
## 2021-05-05 NOTE — DISCHARGE NOTE PROVIDER - INSTRUCTIONS
Choose lean meats and poultry without skin and prepare them without added saturated and trans fat. Eat fish at least twice a week.  Select fat-free, 1 percent fat and low-fat dairy products.  Cut back on foods containing partially hydrogenated vegetable oils to reduce trans fat in your diet. To lower cholesterol, reduce saturated fat to no more than 5 to 6 percent of total calories. For someone eating 2,000 calories a day, that’s about 13 grams of saturated fat.  Cut back on beverages and foods with added sugars.  Choose and prepare foods with little or no salt. To lower blood pressure, aim to eat no more than 2,400 milligrams of sodium per day. Reducing daily intake to 1,500 mg is desirable because it can lower blood pressure even further.  If you drink alcohol, drink in moderation. That means one drink per day if you’re a woman and two drinks  per day if you’re a man.

## 2021-05-05 NOTE — H&P PST ADULT - NSICDXFAMILYHX_GEN_ALL_CORE_FT
FAMILY HISTORY:  Father  Still living? No  Family history of cerebrovascular accident (CVA), Age at diagnosis: 71-80    Mother  Still living? No  Family history of malignant neoplasm of colon, Age at diagnosis: 71-80
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
## 2021-05-05 NOTE — PROGRESS NOTE ADULT - ASSESSMENT
85y Male   Procedure: Left heart catheterization    1. S/P LHC: Severe LM and multivessel CAD.  ·	Remove radial band at: 6:30PM  ·	Wrist precautions explained.  ·	Medications: Aspirin 81 mg daily.  ·	CT surgery evaluation for CABG    2. Severe AS  ·	CT surgery evaluation for AVR    3. HTN  ·	Continue metoprolol 50 mg BID  ·	Continue irbesartan 300 mg daily  ·	Continue amlodipine 10 mg daily    4. HLD  ·	Continue Lipitor 20 mg daily    5. History of DVT  ·	Restart warfarin tonight.    6. Hypokalemia  ·	Patient given KCl 40 mEq once    Discharge Planning:   ·	If OK, discharge home at: 7:30PM  ·	Follow up as an outpatient with: Dr. Fung

## 2021-05-06 PROBLEM — I35.0 NONRHEUMATIC AORTIC (VALVE) STENOSIS: Chronic | Status: ACTIVE | Noted: 2021-05-05

## 2021-05-06 PROBLEM — I45.3 TRIFASCICULAR BLOCK: Chronic | Status: ACTIVE | Noted: 2021-05-05

## 2021-05-06 PROBLEM — N40.0 BENIGN PROSTATIC HYPERPLASIA WITHOUT LOWER URINARY TRACT SYMPTOMS: Chronic | Status: ACTIVE | Noted: 2021-05-05

## 2021-05-06 PROBLEM — I10 ESSENTIAL (PRIMARY) HYPERTENSION: Chronic | Status: ACTIVE | Noted: 2021-05-05

## 2021-05-06 PROBLEM — I44.0 ATRIOVENTRICULAR BLOCK, FIRST DEGREE: Chronic | Status: ACTIVE | Noted: 2021-05-05

## 2021-05-06 PROBLEM — E78.2 MIXED HYPERLIPIDEMIA: Chronic | Status: ACTIVE | Noted: 2021-05-05

## 2021-05-06 PROBLEM — I44.1 ATRIOVENTRICULAR BLOCK, SECOND DEGREE: Chronic | Status: ACTIVE | Noted: 2021-05-05

## 2021-05-11 ENCOUNTER — APPOINTMENT (OUTPATIENT)
Dept: CARDIOTHORACIC SURGERY | Facility: CLINIC | Age: 86
End: 2021-05-11
Payer: MEDICARE

## 2021-05-11 VITALS
SYSTOLIC BLOOD PRESSURE: 165 MMHG | BODY MASS INDEX: 27.58 KG/M2 | RESPIRATION RATE: 18 BRPM | DIASTOLIC BLOOD PRESSURE: 85 MMHG | HEART RATE: 97 BPM | OXYGEN SATURATION: 95 % | HEIGHT: 68 IN | WEIGHT: 182 LBS

## 2021-05-11 DIAGNOSIS — I44.0 ATRIOVENTRICULAR BLOCK, FIRST DEGREE: ICD-10-CM

## 2021-05-11 DIAGNOSIS — R01.1 CARDIAC MURMUR, UNSPECIFIED: ICD-10-CM

## 2021-05-11 DIAGNOSIS — Z86.03 PERSONAL HISTORY OF NEOPLASM OF UNCERTAIN BEHAVIOR: ICD-10-CM

## 2021-05-11 DIAGNOSIS — I45.3 TRIFASCICULAR BLOCK: ICD-10-CM

## 2021-05-11 DIAGNOSIS — Z87.828 PERSONAL HISTORY OF OTHER (HEALED) PHYSICAL INJURY AND TRAUMA: ICD-10-CM

## 2021-05-11 DIAGNOSIS — I38 ENDOCARDITIS, VALVE UNSPECIFIED: ICD-10-CM

## 2021-05-11 PROCEDURE — 99215 OFFICE O/P EST HI 40 MIN: CPT

## 2021-05-12 ENCOUNTER — NON-APPOINTMENT (OUTPATIENT)
Age: 86
End: 2021-05-12

## 2021-05-12 ENCOUNTER — APPOINTMENT (OUTPATIENT)
Dept: CARDIOLOGY | Facility: CLINIC | Age: 86
End: 2021-05-12
Payer: MEDICARE

## 2021-05-12 VITALS
OXYGEN SATURATION: 97 % | RESPIRATION RATE: 17 BRPM | HEART RATE: 75 BPM | DIASTOLIC BLOOD PRESSURE: 84 MMHG | BODY MASS INDEX: 27.58 KG/M2 | SYSTOLIC BLOOD PRESSURE: 162 MMHG | WEIGHT: 182 LBS | HEIGHT: 68 IN | TEMPERATURE: 97 F

## 2021-05-12 PROBLEM — Z87.828 HISTORY OF MOTOR VEHICLE ACCIDENT: Status: RESOLVED | Noted: 2021-05-12 | Resolved: 2021-05-12

## 2021-05-12 PROBLEM — Z86.03 HISTORY OF MONOCLONAL GAMMOPATHY: Status: RESOLVED | Noted: 2021-05-12 | Resolved: 2021-05-12

## 2021-05-12 PROBLEM — R01.1 HEART MURMUR: Status: ACTIVE | Noted: 2020-06-30

## 2021-05-12 PROBLEM — I45.3 TRIFASCICULAR BLOCK: Status: ACTIVE | Noted: 2021-03-04

## 2021-05-12 PROBLEM — I44.0 1ST DEGREE AV BLOCK: Status: ACTIVE | Noted: 2020-07-29

## 2021-05-12 PROCEDURE — 93000 ELECTROCARDIOGRAM COMPLETE: CPT

## 2021-05-12 PROCEDURE — 99215 OFFICE O/P EST HI 40 MIN: CPT

## 2021-05-12 NOTE — ASSESSMENT
[FreeTextEntry1] : Echocardiogram February 22, 2021 demonstrated left ventricle normal size and function with ejection fraction of 55 to 60%.  Mild concentric LVH.  Mildly dilated left atrium.  Moderate to severe aortic stenosis.  Trace mitral, mild aortic and mild tricuspid regurgitation.  Mild dilatation of the aortic root and ascending aorta.\par \par Cardiac catheterization May 5, 2021 demonstrated 60% distal left main stenosis.  70% mid LAD stenosis.  80% ostial circumflex stenosis with 90% OM1 stenosis and a 50% more distal OM1 stenosis.  40% proximal and distal RCA stenoses.\par \par EKG: Sinus rhythm with first-degree AV block.  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  \par \par 85-year-old man with a past medical history of hypertension, dyslipidemia, AS, DVT who presents to me for f/u.  Patient underwent cardiac catheterization in anticipation of TAVR but was found to have severe multivessel disease.  He has a trifurcation lesion at the distal left main into the LAD and circumflex as well as a high OM1.  He has nonobstructive RCA disease and a relatively small vessel.  In discussion with the interventionalist he believes that given the severe calcifications of the lesion and the trifurcation he is not a candidate for percutaneous intervention.  He discussed this with his cardiothoracic surgeon who expressed concern that his recovery from surgery would be prolonged because of his inactivity related to his hip.  However surgically it would appear that he is not of prohibitive risk.  At this time it would be most prudent for him to have surgery and he seems to be willing.  Ultimately he would like to have his hip done and this could not be done unless he was fully revascularized and his aortic valve was replaced.  Additionally he will ultimately need a pacemaker, especially if he undergoes surgery and the timing of that will need to be figured out as well.  At this time there is no evidence of acute heart failure.

## 2021-05-12 NOTE — DISCUSSION/SUMMARY
[FreeTextEntry1] : 1. Follow-up with cardiothoracic surgery to plan CABG/AVR.\par 2. Follow-up with EP for pacemaker placement which will be done in concert with his surgery.\par 3. Continue current cardiac meds in doses as noted above for hypertension, CAD, dyslipidemia.\par 4. Continue anticoagulation for DVT. INR is monitored through his primary.\par 5. Monitor BP at home, keep a log and bring to f/u.\par 6. Follow up scheduled here in 2 months, but will be adjusted based on his surgery.

## 2021-05-12 NOTE — PHYSICAL EXAM
[General Appearance - In No Acute Distress] : no acute distress [Normal Conjunctiva] : the conjunctiva exhibited no abnormalities [Normal Oral Mucosa] : normal oral mucosa [Auscultation Breath Sounds / Voice Sounds] : lungs were clear to auscultation bilaterally [Normal Rate] : normal [Rhythm Regular] : regular [Normal S1] : normal S1 [Normal S2] : normal S2 [S3] : no S3 [S4] : an S4 was heard [III] : a grade 3 [Abdomen Soft] : soft [Abdomen Tenderness] : non-tender [Nail Clubbing] : no clubbing of the fingernails [Cyanosis, Localized] : no localized cyanosis [FreeTextEntry1] : 1+ LLE edema, no R edema [Oriented To Time, Place, And Person] : oriented to person, place, and time [Affect] : the affect was normal

## 2021-05-12 NOTE — HISTORY OF PRESENT ILLNESS
[FreeTextEntry1] : Patient presents back to the office today having undergone cardiac catheterization showing severe multivessel disease.  Ultimately it was decided that the patient is not an option a candidate for percutaneous intervention.  He followed up with the surgeon who expressed concern about his recovery from potential open surgery.  The patient continues to be very inactive which he blames primarily on his hip.  He does not have a lot of shortness of breath but he is exertion is very limited.  He does not report any new symptoms at this time.  Patient denies chest pain, palpitations, orthopnea, presyncope, syncope.

## 2021-05-19 NOTE — ASSESSMENT
[FreeTextEntry1] : I had the pleasure of evaluating  Mr. Prado. He is accompanied with his wife. Today I have  discussed  his  Echocardiogram and Cardiac Catheterization from  [facility]  that showed multivessel disease and severe aortic stenosis. He reports difficulty ambulating due to the deformity of his left hip, however he offers no other complaints. \par \par At this time Mr. Prado is not a candidate for stenting, however I would like to consider  him for a  CABGx 3 with AVR after he follows up with Dr. Harrington for a possible  sooner interventional date for scheduling his Permanent Pacemaker;  history of Heart Blocks. The timeframe of his  scheduled Permanent Pacemaker is July 2021 which  has delayed  not only his left  hip replacement,  but also with scheduling a CABGx 3 with AVR. During exam his gait is significantly unbalanced and the left hip deformity is  hindering his ambulation. All risks, benefits, and alternatives discussed at length with patient and wife.  Expectations reviewed with patient and wife. All questions addressed. Patient and wife agree and will adhere to plan. \par \par PLAN: \par  -Follow up with Dr. Harrington \par -Consider  CABG x 3 with AVR after evaluation with Dr. Gracia \par -Obtain operative report Helder filter placement\par \par ISonia NP am scribing for and in the presence of .  the following sections HISTORY OF PRESENT ILLNESS, PAST MEDICAL/FAMILY/SOCIAL HISTORY; REVIEW OF SYSTEMS; VITAL SIGNS; PHYSICAL EXAM; DISPOSITION.\par \par "I personally performed the services described in the documentation, reviewed the documentation recorded by the scribe in my presence and accurately and completely records my words and actions."\par \par
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
## 2021-05-19 NOTE — DATA REVIEWED
[FreeTextEntry1] : CT Angiogram of Chest/Abdomen/Pelvis from 4/27/21 at [facility] \par -Aortic measurements as described.\par -Prostate is enlarged, correlate with PSA.\par -IVC filter in place.\par -Left groin AV fistula. Can be better characterized with ultrasound duplex.\par -Left hip joint effusion.\par -3 mm right middle lobe pulmonary nodule, stable compared to 7/4/2019.\par \par 4.27.21 Transthoracic Echocardiogram at [facility] \par  1. 3D LVEF is 71%.\par  2. Normal global left ventricular systolic function.\par  3. Spectral Doppler shows impaired relaxation pattern of left ventricular myocardial filling (Grade I diastolic dysfunction).\par  4. There is severe concentric left ventricular hypertrophy.\par  5. Mildly increased RV wall thickness with normal RV size and function (3D RVEF 54%).\par  6. Normal left atrial size.\par  7. Normal right atrial size.\par  8. Lipomatous hypertrophy and hypermobility of the intra-atrial septum.\par  9. Mild-moderate tricuspid regurgitation.\par 10. Mild to moderate pulmonic valve regurgitation.\par 11. Trace mitral valve regurgitation.\par 12. Moderate thickening of the anterior and posterior mitral valve leaflets.\par 13. Peak transaortic gradient equals 46.4 mmHg, mean transaortic gradient equals 31.8 mmHg, the calculated aortic valve area equals 0.80 cmï¿½ by the continuity equation consistent with severe aortic stenosis.\par 14. Mild dilatation of the aortic root (3.9 cm) and ascending aorta (4.1 cm) \par \par Cardiac Catheterization from 5/5/21 at [facility] \par - There was a 60% Distal Left Main occlusion\par - 80% stenosis of Ostial Circ\par -90% stenosis of OM1 with a second lesion of 50%\par - 40% Proximal RCA, 40% distal RCA\par - CABG AVR referral\par \par Severe LM, Osital circumflex/Om1  and LAD disease. \par Severe AS by TTE

## 2021-05-19 NOTE — CONSULT LETTER
[Dear  ___] : Dear  [unfilled], [Courtesy Letter:] : I had the pleasure of seeing your patient, [unfilled], in my office today. [Please see my note below.] : Please see my note below. [Consult Closing:] : Thank you very much for allowing me to participate in the care of this patient.  If you have any questions, please do not hesitate to contact me. [Sincerely,] : Sincerely, [FreeTextEntry2] : Taz Harrington MD [FreeTextEntry3] : Minh Fung MD\par  of Cardiothoracic Surgery\par [facility]\par [address] \[place] [address]\par [phone]\par

## 2021-05-19 NOTE — PHYSICAL EXAM
[Sclera] : the sclera and conjunctiva were normal [Neck Appearance] : the appearance of the neck was normal [] : no respiratory distress [Examination Of The Chest] : the chest was normal in appearance [Chest Visual Inspection Thoracic Asymmetry] : no chest asymmetry [Skin Color & Pigmentation] : normal skin color and pigmentation [Sensation] : the sensory exam was normal to light touch and pinprick [Motor Exam] : the motor exam was normal [Oriented To Time, Place, And Person] : oriented to person, place, and time [FreeTextEntry1] : Use of cane for significantly antalgic gait. Left hip deformity

## 2021-06-21 ENCOUNTER — APPOINTMENT (OUTPATIENT)
Dept: PULMONOLOGY | Facility: CLINIC | Age: 86
End: 2021-06-21
Payer: MEDICARE

## 2021-06-21 ENCOUNTER — RESULT REVIEW (OUTPATIENT)
Age: 86
End: 2021-06-21

## 2021-06-21 ENCOUNTER — OUTPATIENT (OUTPATIENT)
Dept: OUTPATIENT SERVICES | Facility: HOSPITAL | Age: 86
LOS: 1 days | End: 2021-06-21
Payer: MEDICARE

## 2021-06-21 VITALS
HEIGHT: 66.5 IN | TEMPERATURE: 98 F | SYSTOLIC BLOOD PRESSURE: 122 MMHG | DIASTOLIC BLOOD PRESSURE: 60 MMHG | RESPIRATION RATE: 18 BRPM | WEIGHT: 179.02 LBS | HEART RATE: 84 BPM

## 2021-06-21 VITALS — HEIGHT: 66 IN | WEIGHT: 177 LBS | BODY MASS INDEX: 28.45 KG/M2 | TEMPERATURE: 97.7 F

## 2021-06-21 DIAGNOSIS — Z98.890 OTHER SPECIFIED POSTPROCEDURAL STATES: Chronic | ICD-10-CM

## 2021-06-21 DIAGNOSIS — Z29.9 ENCOUNTER FOR PROPHYLACTIC MEASURES, UNSPECIFIED: ICD-10-CM

## 2021-06-21 DIAGNOSIS — I25.10 ATHEROSCLEROTIC HEART DISEASE OF NATIVE CORONARY ARTERY WITHOUT ANGINA PECTORIS: ICD-10-CM

## 2021-06-21 DIAGNOSIS — U07.1 COVID-19: ICD-10-CM

## 2021-06-21 DIAGNOSIS — Z95.828 PRESENCE OF OTHER VASCULAR IMPLANTS AND GRAFTS: Chronic | ICD-10-CM

## 2021-06-21 DIAGNOSIS — I35.0 NONRHEUMATIC AORTIC (VALVE) STENOSIS: ICD-10-CM

## 2021-06-21 DIAGNOSIS — Z01.818 ENCOUNTER FOR OTHER PREPROCEDURAL EXAMINATION: ICD-10-CM

## 2021-06-21 LAB
A1C WITH ESTIMATED AVERAGE GLUCOSE RESULT: 5.9 % — HIGH (ref 4–5.6)
ALBUMIN SERPL ELPH-MCNC: 4.1 G/DL — SIGNIFICANT CHANGE UP (ref 3.3–5.2)
ALP SERPL-CCNC: 119 U/L — SIGNIFICANT CHANGE UP (ref 40–120)
ALT FLD-CCNC: 14 U/L — SIGNIFICANT CHANGE UP
ANION GAP SERPL CALC-SCNC: 13 MMOL/L — SIGNIFICANT CHANGE UP (ref 5–17)
APPEARANCE UR: CLEAR — SIGNIFICANT CHANGE UP
APTT BLD: 42 SEC — HIGH (ref 27.5–35.5)
AST SERPL-CCNC: 21 U/L — SIGNIFICANT CHANGE UP
BACTERIA # UR AUTO: ABNORMAL
BASOPHILS # BLD AUTO: 0.01 K/UL — SIGNIFICANT CHANGE UP (ref 0–0.2)
BASOPHILS NFR BLD AUTO: 0.2 % — SIGNIFICANT CHANGE UP (ref 0–2)
BILIRUB SERPL-MCNC: 0.7 MG/DL — SIGNIFICANT CHANGE UP (ref 0.4–2)
BILIRUB UR-MCNC: NEGATIVE — SIGNIFICANT CHANGE UP
BLD GP AB SCN SERPL QL: SIGNIFICANT CHANGE UP
BUN SERPL-MCNC: 25.3 MG/DL — HIGH (ref 8–20)
CALCIUM SERPL-MCNC: 9.6 MG/DL — SIGNIFICANT CHANGE UP (ref 8.6–10.2)
CHLORIDE SERPL-SCNC: 103 MMOL/L — SIGNIFICANT CHANGE UP (ref 98–107)
CO2 SERPL-SCNC: 25 MMOL/L — SIGNIFICANT CHANGE UP (ref 22–29)
COLOR SPEC: YELLOW — SIGNIFICANT CHANGE UP
CREAT SERPL-MCNC: 1.09 MG/DL — SIGNIFICANT CHANGE UP (ref 0.5–1.3)
DIFF PNL FLD: NEGATIVE — SIGNIFICANT CHANGE UP
EOSINOPHIL # BLD AUTO: 0.06 K/UL — SIGNIFICANT CHANGE UP (ref 0–0.5)
EOSINOPHIL NFR BLD AUTO: 1.2 % — SIGNIFICANT CHANGE UP (ref 0–6)
EPI CELLS # UR: SIGNIFICANT CHANGE UP
ESTIMATED AVERAGE GLUCOSE: 123 MG/DL — HIGH (ref 68–114)
GLUCOSE SERPL-MCNC: 119 MG/DL — HIGH (ref 70–99)
GLUCOSE UR QL: NEGATIVE MG/DL — SIGNIFICANT CHANGE UP
HCT VFR BLD CALC: 40.8 % — SIGNIFICANT CHANGE UP (ref 39–50)
HGB BLD-MCNC: 13.9 G/DL — SIGNIFICANT CHANGE UP (ref 13–17)
IMM GRANULOCYTES NFR BLD AUTO: 0.4 % — SIGNIFICANT CHANGE UP (ref 0–1.5)
INR BLD: 1.74 RATIO — HIGH (ref 0.88–1.16)
KETONES UR-MCNC: NEGATIVE — SIGNIFICANT CHANGE UP
LEUKOCYTE ESTERASE UR-ACNC: ABNORMAL
LYMPHOCYTES # BLD AUTO: 1.03 K/UL — SIGNIFICANT CHANGE UP (ref 1–3.3)
LYMPHOCYTES # BLD AUTO: 20 % — SIGNIFICANT CHANGE UP (ref 13–44)
MCHC RBC-ENTMCNC: 30.6 PG — SIGNIFICANT CHANGE UP (ref 27–34)
MCHC RBC-ENTMCNC: 34.1 GM/DL — SIGNIFICANT CHANGE UP (ref 32–36)
MCV RBC AUTO: 89.9 FL — SIGNIFICANT CHANGE UP (ref 80–100)
MONOCYTES # BLD AUTO: 0.48 K/UL — SIGNIFICANT CHANGE UP (ref 0–0.9)
MONOCYTES NFR BLD AUTO: 9.3 % — SIGNIFICANT CHANGE UP (ref 2–14)
NEUTROPHILS # BLD AUTO: 3.54 K/UL — SIGNIFICANT CHANGE UP (ref 1.8–7.4)
NEUTROPHILS NFR BLD AUTO: 68.9 % — SIGNIFICANT CHANGE UP (ref 43–77)
NITRITE UR-MCNC: NEGATIVE — SIGNIFICANT CHANGE UP
NT-PROBNP SERPL-SCNC: 381 PG/ML — HIGH (ref 0–300)
PH UR: 5 — SIGNIFICANT CHANGE UP (ref 5–8)
PLATELET # BLD AUTO: 164 K/UL — SIGNIFICANT CHANGE UP (ref 150–400)
POTASSIUM SERPL-MCNC: 3.9 MMOL/L — SIGNIFICANT CHANGE UP (ref 3.5–5.3)
POTASSIUM SERPL-SCNC: 3.9 MMOL/L — SIGNIFICANT CHANGE UP (ref 3.5–5.3)
PREALB SERPL-MCNC: 23 MG/DL — SIGNIFICANT CHANGE UP (ref 18–38)
PROT SERPL-MCNC: 7.6 G/DL — SIGNIFICANT CHANGE UP (ref 6.6–8.7)
PROT UR-MCNC: 30 MG/DL
PROTHROM AB SERPL-ACNC: 19.7 SEC — HIGH (ref 10.6–13.6)
RBC # BLD: 4.54 M/UL — SIGNIFICANT CHANGE UP (ref 4.2–5.8)
RBC # FLD: 13.4 % — SIGNIFICANT CHANGE UP (ref 10.3–14.5)
RBC CASTS # UR COMP ASSIST: NEGATIVE /HPF — SIGNIFICANT CHANGE UP (ref 0–4)
SODIUM SERPL-SCNC: 141 MMOL/L — SIGNIFICANT CHANGE UP (ref 135–145)
SP GR SPEC: 1.02 — SIGNIFICANT CHANGE UP (ref 1.01–1.02)
T3 SERPL-MCNC: 93 NG/DL — SIGNIFICANT CHANGE UP (ref 80–200)
T4 AB SER-ACNC: 7 UG/DL — SIGNIFICANT CHANGE UP (ref 4.5–12)
TSH SERPL-MCNC: 1.27 UIU/ML — SIGNIFICANT CHANGE UP (ref 0.27–4.2)
UROBILINOGEN FLD QL: NEGATIVE MG/DL — SIGNIFICANT CHANGE UP
WBC # BLD: 5.14 K/UL — SIGNIFICANT CHANGE UP (ref 3.8–10.5)
WBC # FLD AUTO: 5.14 K/UL — SIGNIFICANT CHANGE UP (ref 3.8–10.5)
WBC UR QL: SIGNIFICANT CHANGE UP

## 2021-06-21 PROCEDURE — 93010 ELECTROCARDIOGRAM REPORT: CPT

## 2021-06-21 PROCEDURE — 85018 HEMOGLOBIN: CPT | Mod: QW

## 2021-06-21 PROCEDURE — 94729 DIFFUSING CAPACITY: CPT

## 2021-06-21 PROCEDURE — 71046 X-RAY EXAM CHEST 2 VIEWS: CPT | Mod: 26

## 2021-06-21 PROCEDURE — 93880 EXTRACRANIAL BILAT STUDY: CPT | Mod: 26

## 2021-06-21 PROCEDURE — 94727 GAS DIL/WSHOT DETER LNG VOL: CPT

## 2021-06-21 PROCEDURE — 94010 BREATHING CAPACITY TEST: CPT

## 2021-06-21 RX ORDER — ASPIRIN/CALCIUM CARB/MAGNESIUM 324 MG
1 TABLET ORAL
Qty: 0 | Refills: 0 | DISCHARGE

## 2021-06-21 NOTE — PATIENT PROFILE ADULT - NSPROPOAPRESSUREINJURY_GEN_A_NUR
October 15, 2018      Other  [address]  [place]  [place] [address]           [address]  [place] [address]  Phone: [phone]  Fax: [phone]          Patient: Margie Oliveira   MR Number: [id number]   YOB: 1952   Date of Visit: 10/15/2018       Dear Other:    Thank you for referring Margie Oliveira to me for evaluation. Attached you will find relevant portions of my assessment and plan of care.    If you have questions, please do not hesitate to call me. I look forward to following Margie Oliveira along with you.    Sincerely,    Leatha Caro MD    Enclosure  CC:  No Recipients    If you would like to receive this communication electronically, please contact [email or web address] or [phone] to request more information on [employer] access.    For providers and/or their staff who would like to refer a patient to [facility], please contact us through our one-stop-shop provider referral line, Margarita Vuong, at [phone].    If you feel you have received this communication in error or would no longer like to receive these types of communications, please e-mail [email or web address]         
no
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
## 2021-06-21 NOTE — H&P PST ADULT - LAB RESULTS AND INTERPRETATION
MSSA positive ,mupiricin called to [facility] pharmacy and pt made aware  pt 1.97 inr 1.74   pt is on coumadin .  email sent to Padmini Mckeon  in cardiology .   K DAmico NP 6/22/21 4 pm MSSA positive ,mupiricin called to [facility] pharmacy and pt made aware  pt 1.97 inr 1.74   pt is on coumadin .  email sent to Sonia Gannon and Lion lees NP  in cardiology .   K DAmico NP 6/22/21 4 pm MSSA positive ,joy called to [facility] pharmacy and pt made aware  pt 1.97 inr 1.74   pt is on coumadin .  email sent to Sonia Gannon and Lion lees  NP  in cardiology .   K DAmico NP 6/22/21 4 pm  6-23-21: Patient's wife called to say that no prescription was sent to Pharmacy, called [facility] to make sure they didn't order it, spoke to Lion, she said she got the email from [facility] that she sent the prescription, resent the prescription to [facility] pharmacy, called the patient and gave instructions about the treatment, patient wife said she got the medication already. LISA GIVENS

## 2021-06-21 NOTE — H&P PST ADULT - EKG AND INTERPRETATION
sinus rhythm with 1st degree av block  75   right BBB  75  left anterior fasicular block   minimal criterea  for lvh

## 2021-06-21 NOTE — H&P PST ADULT - REASON FOR ADMISSION
I am having my aortic valve replaced and a bypass I am having my aortic valve replaced and a cardiac  bypass

## 2021-06-21 NOTE — H&P PST ADULT - NSICDXPASTMEDICALHX_GEN_ALL_CORE_FT
PAST MEDICAL HISTORY:  1st degree AV block     2nd degree AV block     Aortic valve stenosis, etiology of cardiac valve disease unspecified     Arthritis     BPH (benign prostatic hyperplasia)     DVT (deep venous thrombosis) IVC filter and coumadin    Essential hypertension     Mixed hyperlipidemia     Monoclonal gammopathy     Trifascicular block      PAST MEDICAL HISTORY:  1st degree AV block     2nd degree AV block     Aortic valve stenosis, etiology of cardiac valve disease unspecified     Arthritis     BPH (benign prostatic hyperplasia)     CAD (coronary artery disease)     DVT (deep venous thrombosis) IVC filter and coumadin    DVT, lower extremity left leg 2018    Essential hypertension     Mixed hyperlipidemia     Monoclonal gammopathy     Trifascicular block

## 2021-06-21 NOTE — H&P PST ADULT - NSICDXPROBLEM_GEN_ALL_CORE_FT
PROBLEM DIAGNOSES  Problem: Need for prophylactic measure  Assessment and Plan: caprini score 10   on coumadin being admited  6/24/21     Problem: Aortic stenosis  Assessment and Plan: re op sternotomy , aortic valve replacement     Problem: CAD (coronary artery disease)  Assessment and Plan: coronary artery bypass graft    Problem: 2019 novel coronavirus disease (COVID-19)  Assessment and Plan: vaccinated x 2 moderna
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
## 2021-06-22 LAB
MRSA PCR RESULT.: SIGNIFICANT CHANGE UP
S AUREUS DNA NOSE QL NAA+PROBE: DETECTED

## 2021-06-23 PROBLEM — I82.409 ACUTE EMBOLISM AND THROMBOSIS OF UNSPECIFIED DEEP VEINS OF UNSPECIFIED LOWER EXTREMITY: Chronic | Status: ACTIVE | Noted: 2021-06-21

## 2021-06-23 PROBLEM — I25.10 ATHEROSCLEROTIC HEART DISEASE OF NATIVE CORONARY ARTERY WITHOUT ANGINA PECTORIS: Chronic | Status: ACTIVE | Noted: 2021-06-21

## 2021-06-23 LAB
CULTURE RESULTS: SIGNIFICANT CHANGE UP
SPECIMEN SOURCE: SIGNIFICANT CHANGE UP

## 2021-06-23 RX ORDER — MUPIROCIN 20 MG/G
1 OINTMENT TOPICAL
Qty: 1 | Refills: 0
Start: 2021-06-23 | End: 2021-06-27

## 2021-06-24 ENCOUNTER — INPATIENT (INPATIENT)
Facility: HOSPITAL | Age: 86
LOS: 5 days | Discharge: HOME | DRG: 220 | End: 2021-06-30
Attending: THORACIC SURGERY (CARDIOTHORACIC VASCULAR SURGERY) | Admitting: THORACIC SURGERY (CARDIOTHORACIC VASCULAR SURGERY)
Payer: MEDICARE

## 2021-06-24 VITALS
HEART RATE: 75 BPM | OXYGEN SATURATION: 96 % | SYSTOLIC BLOOD PRESSURE: 164 MMHG | TEMPERATURE: 99 F | DIASTOLIC BLOOD PRESSURE: 73 MMHG | RESPIRATION RATE: 18 BRPM

## 2021-06-24 DIAGNOSIS — Z98.890 OTHER SPECIFIED POSTPROCEDURAL STATES: Chronic | ICD-10-CM

## 2021-06-24 DIAGNOSIS — I25.10 ATHEROSCLEROTIC HEART DISEASE OF NATIVE CORONARY ARTERY WITHOUT ANGINA PECTORIS: ICD-10-CM

## 2021-06-24 DIAGNOSIS — I45.3 TRIFASCICULAR BLOCK: ICD-10-CM

## 2021-06-24 DIAGNOSIS — I82.409 ACUTE EMBOLISM AND THROMBOSIS OF UNSPECIFIED DEEP VEINS OF UNSPECIFIED LOWER EXTREMITY: ICD-10-CM

## 2021-06-24 DIAGNOSIS — I35.0 NONRHEUMATIC AORTIC (VALVE) STENOSIS: ICD-10-CM

## 2021-06-24 DIAGNOSIS — Z95.828 PRESENCE OF OTHER VASCULAR IMPLANTS AND GRAFTS: Chronic | ICD-10-CM

## 2021-06-24 LAB
A1C WITH ESTIMATED AVERAGE GLUCOSE RESULT: 5.8 % — HIGH (ref 4–5.6)
ABO RH CONFIRMATION: SIGNIFICANT CHANGE UP
ALBUMIN SERPL ELPH-MCNC: 4.1 G/DL — SIGNIFICANT CHANGE UP (ref 3.3–5.2)
ALP SERPL-CCNC: 118 U/L — SIGNIFICANT CHANGE UP (ref 40–120)
ALT FLD-CCNC: 12 U/L — SIGNIFICANT CHANGE UP
ANION GAP SERPL CALC-SCNC: 10 MMOL/L — SIGNIFICANT CHANGE UP (ref 5–17)
APPEARANCE UR: CLEAR — SIGNIFICANT CHANGE UP
APTT BLD: 148.5 SEC — CRITICAL HIGH (ref 27.5–35.5)
APTT BLD: 37.6 SEC — HIGH (ref 27.5–35.5)
AST SERPL-CCNC: 20 U/L — SIGNIFICANT CHANGE UP
BASOPHILS # BLD AUTO: 0.02 K/UL — SIGNIFICANT CHANGE UP (ref 0–0.2)
BASOPHILS NFR BLD AUTO: 0.4 % — SIGNIFICANT CHANGE UP (ref 0–2)
BILIRUB SERPL-MCNC: 1.3 MG/DL — SIGNIFICANT CHANGE UP (ref 0.4–2)
BILIRUB UR-MCNC: NEGATIVE — SIGNIFICANT CHANGE UP
BUN SERPL-MCNC: 20 MG/DL — SIGNIFICANT CHANGE UP (ref 8–20)
CALCIUM SERPL-MCNC: 9.7 MG/DL — SIGNIFICANT CHANGE UP (ref 8.6–10.2)
CHLORIDE SERPL-SCNC: 102 MMOL/L — SIGNIFICANT CHANGE UP (ref 98–107)
CO2 SERPL-SCNC: 28 MMOL/L — SIGNIFICANT CHANGE UP (ref 22–29)
COLOR SPEC: YELLOW — SIGNIFICANT CHANGE UP
CREAT SERPL-MCNC: 1.11 MG/DL — SIGNIFICANT CHANGE UP (ref 0.5–1.3)
DIFF PNL FLD: NEGATIVE — SIGNIFICANT CHANGE UP
EOSINOPHIL # BLD AUTO: 0.03 K/UL — SIGNIFICANT CHANGE UP (ref 0–0.5)
EOSINOPHIL NFR BLD AUTO: 0.6 % — SIGNIFICANT CHANGE UP (ref 0–6)
EPI CELLS # UR: NEGATIVE — SIGNIFICANT CHANGE UP
ESTIMATED AVERAGE GLUCOSE: 120 MG/DL — HIGH (ref 68–114)
GLUCOSE SERPL-MCNC: 126 MG/DL — HIGH (ref 70–99)
GLUCOSE UR QL: NEGATIVE MG/DL — SIGNIFICANT CHANGE UP
HCT VFR BLD CALC: 36.6 % — LOW (ref 39–50)
HCT VFR BLD CALC: 39.6 % — SIGNIFICANT CHANGE UP (ref 39–50)
HGB BLD-MCNC: 12.5 G/DL — LOW (ref 13–17)
HGB BLD-MCNC: 13.4 G/DL — SIGNIFICANT CHANGE UP (ref 13–17)
IMM GRANULOCYTES NFR BLD AUTO: 0.4 % — SIGNIFICANT CHANGE UP (ref 0–1.5)
INR BLD: 1.18 RATIO — HIGH (ref 0.88–1.16)
KETONES UR-MCNC: NEGATIVE — SIGNIFICANT CHANGE UP
LEUKOCYTE ESTERASE UR-ACNC: NEGATIVE — SIGNIFICANT CHANGE UP
LYMPHOCYTES # BLD AUTO: 0.59 K/UL — LOW (ref 1–3.3)
LYMPHOCYTES # BLD AUTO: 12.5 % — LOW (ref 13–44)
MCHC RBC-ENTMCNC: 30.3 PG — SIGNIFICANT CHANGE UP (ref 27–34)
MCHC RBC-ENTMCNC: 30.7 PG — SIGNIFICANT CHANGE UP (ref 27–34)
MCHC RBC-ENTMCNC: 33.8 GM/DL — SIGNIFICANT CHANGE UP (ref 32–36)
MCHC RBC-ENTMCNC: 34.2 GM/DL — SIGNIFICANT CHANGE UP (ref 32–36)
MCV RBC AUTO: 89.6 FL — SIGNIFICANT CHANGE UP (ref 80–100)
MCV RBC AUTO: 89.9 FL — SIGNIFICANT CHANGE UP (ref 80–100)
MONOCYTES # BLD AUTO: 0.39 K/UL — SIGNIFICANT CHANGE UP (ref 0–0.9)
MONOCYTES NFR BLD AUTO: 8.2 % — SIGNIFICANT CHANGE UP (ref 2–14)
MRSA PCR RESULT.: SIGNIFICANT CHANGE UP
NEUTROPHILS # BLD AUTO: 3.68 K/UL — SIGNIFICANT CHANGE UP (ref 1.8–7.4)
NEUTROPHILS NFR BLD AUTO: 77.9 % — HIGH (ref 43–77)
NITRITE UR-MCNC: NEGATIVE — SIGNIFICANT CHANGE UP
NT-PROBNP SERPL-SCNC: 440 PG/ML — HIGH (ref 0–300)
PH UR: 8 — SIGNIFICANT CHANGE UP (ref 5–8)
PLATELET # BLD AUTO: 161 K/UL — SIGNIFICANT CHANGE UP (ref 150–400)
PLATELET # BLD AUTO: 164 K/UL — SIGNIFICANT CHANGE UP (ref 150–400)
POTASSIUM SERPL-MCNC: 4.1 MMOL/L — SIGNIFICANT CHANGE UP (ref 3.5–5.3)
POTASSIUM SERPL-SCNC: 4.1 MMOL/L — SIGNIFICANT CHANGE UP (ref 3.5–5.3)
PREALB SERPL-MCNC: 19 MG/DL — SIGNIFICANT CHANGE UP (ref 18–38)
PROT SERPL-MCNC: 7.3 G/DL — SIGNIFICANT CHANGE UP (ref 6.6–8.7)
PROT UR-MCNC: 15 MG/DL
PROTHROM AB SERPL-ACNC: 13.6 SEC — SIGNIFICANT CHANGE UP (ref 10.6–13.6)
RBC # BLD: 4.07 M/UL — LOW (ref 4.2–5.8)
RBC # BLD: 4.42 M/UL — SIGNIFICANT CHANGE UP (ref 4.2–5.8)
RBC # FLD: 13.2 % — SIGNIFICANT CHANGE UP (ref 10.3–14.5)
RBC # FLD: 13.2 % — SIGNIFICANT CHANGE UP (ref 10.3–14.5)
RBC CASTS # UR COMP ASSIST: NEGATIVE /HPF — SIGNIFICANT CHANGE UP (ref 0–4)
S AUREUS DNA NOSE QL NAA+PROBE: DETECTED
SARS-COV-2 RNA SPEC QL NAA+PROBE: SIGNIFICANT CHANGE UP
SODIUM SERPL-SCNC: 140 MMOL/L — SIGNIFICANT CHANGE UP (ref 135–145)
SP GR SPEC: 1.01 — SIGNIFICANT CHANGE UP (ref 1.01–1.02)
TSH SERPL-MCNC: 1.03 UIU/ML — SIGNIFICANT CHANGE UP (ref 0.27–4.2)
UROBILINOGEN FLD QL: NEGATIVE MG/DL — SIGNIFICANT CHANGE UP
WBC # BLD: 4.73 K/UL — SIGNIFICANT CHANGE UP (ref 3.8–10.5)
WBC # BLD: 4.91 K/UL — SIGNIFICANT CHANGE UP (ref 3.8–10.5)
WBC # FLD AUTO: 4.73 K/UL — SIGNIFICANT CHANGE UP (ref 3.8–10.5)
WBC # FLD AUTO: 4.91 K/UL — SIGNIFICANT CHANGE UP (ref 3.8–10.5)
WBC UR QL: SIGNIFICANT CHANGE UP

## 2021-06-24 PROCEDURE — 93451 RIGHT HEART CATH: CPT | Mod: 26

## 2021-06-24 PROCEDURE — 93010 ELECTROCARDIOGRAM REPORT: CPT

## 2021-06-24 RX ORDER — CHLORHEXIDINE GLUCONATE 213 G/1000ML
1 SOLUTION TOPICAL
Refills: 0 | Status: DISCONTINUED | OUTPATIENT
Start: 2021-06-24 | End: 2021-06-25

## 2021-06-24 RX ORDER — CEFUROXIME AXETIL 250 MG
1500 TABLET ORAL ONCE
Refills: 0 | Status: DISCONTINUED | OUTPATIENT
Start: 2021-06-25 | End: 2021-06-25

## 2021-06-24 RX ORDER — CHOLECALCIFEROL (VITAMIN D3) 125 MCG
1000 CAPSULE ORAL DAILY
Refills: 0 | Status: DISCONTINUED | OUTPATIENT
Start: 2021-06-24 | End: 2021-06-25

## 2021-06-24 RX ORDER — AMLODIPINE BESYLATE 2.5 MG/1
10 TABLET ORAL DAILY
Refills: 0 | Status: DISCONTINUED | OUTPATIENT
Start: 2021-06-24 | End: 2021-06-25

## 2021-06-24 RX ORDER — TAMSULOSIN HYDROCHLORIDE 0.4 MG/1
0.4 CAPSULE ORAL AT BEDTIME
Refills: 0 | Status: DISCONTINUED | OUTPATIENT
Start: 2021-06-24 | End: 2021-06-25

## 2021-06-24 RX ORDER — VANCOMYCIN HCL 1 G
1000 VIAL (EA) INTRAVENOUS ONCE
Refills: 0 | Status: DISCONTINUED | OUTPATIENT
Start: 2021-06-25 | End: 2021-06-25

## 2021-06-24 RX ORDER — ATORVASTATIN CALCIUM 80 MG/1
20 TABLET, FILM COATED ORAL AT BEDTIME
Refills: 0 | Status: DISCONTINUED | OUTPATIENT
Start: 2021-06-24 | End: 2021-06-25

## 2021-06-24 RX ORDER — CHLORHEXIDINE GLUCONATE 213 G/1000ML
15 SOLUTION TOPICAL
Refills: 0 | Status: DISCONTINUED | OUTPATIENT
Start: 2021-06-24 | End: 2021-06-25

## 2021-06-24 RX ORDER — HEPARIN SODIUM 5000 [USP'U]/ML
1200 INJECTION INTRAVENOUS; SUBCUTANEOUS
Qty: 25000 | Refills: 0 | Status: DISCONTINUED | OUTPATIENT
Start: 2021-06-24 | End: 2021-06-25

## 2021-06-24 RX ORDER — ALPRAZOLAM 0.25 MG
0.25 TABLET ORAL ONCE
Refills: 0 | Status: DISCONTINUED | OUTPATIENT
Start: 2021-06-24 | End: 2021-06-24

## 2021-06-24 RX ORDER — METOPROLOL TARTRATE 50 MG
50 TABLET ORAL
Refills: 0 | Status: DISCONTINUED | OUTPATIENT
Start: 2021-06-24 | End: 2021-06-25

## 2021-06-24 RX ORDER — MUPIROCIN 20 MG/G
1 OINTMENT TOPICAL
Refills: 0 | Status: DISCONTINUED | OUTPATIENT
Start: 2021-06-24 | End: 2021-06-25

## 2021-06-24 RX ORDER — SODIUM CHLORIDE 9 MG/ML
3 INJECTION INTRAMUSCULAR; INTRAVENOUS; SUBCUTANEOUS EVERY 8 HOURS
Refills: 0 | Status: DISCONTINUED | OUTPATIENT
Start: 2021-06-24 | End: 2021-06-25

## 2021-06-24 RX ADMIN — TAMSULOSIN HYDROCHLORIDE 0.4 MILLIGRAM(S): 0.4 CAPSULE ORAL at 21:39

## 2021-06-24 RX ADMIN — ATORVASTATIN CALCIUM 20 MILLIGRAM(S): 80 TABLET, FILM COATED ORAL at 21:39

## 2021-06-24 RX ADMIN — HEPARIN SODIUM 0 UNIT(S)/HR: 5000 INJECTION INTRAVENOUS; SUBCUTANEOUS at 21:24

## 2021-06-24 RX ADMIN — SODIUM CHLORIDE 3 MILLILITER(S): 9 INJECTION INTRAMUSCULAR; INTRAVENOUS; SUBCUTANEOUS at 14:00

## 2021-06-24 RX ADMIN — CHLORHEXIDINE GLUCONATE 15 MILLILITER(S): 213 SOLUTION TOPICAL at 17:48

## 2021-06-24 RX ADMIN — MUPIROCIN 1 APPLICATION(S): 20 OINTMENT TOPICAL at 18:51

## 2021-06-24 RX ADMIN — HEPARIN SODIUM 950 UNIT(S)/HR: 5000 INJECTION INTRAVENOUS; SUBCUTANEOUS at 22:39

## 2021-06-24 RX ADMIN — CHLORHEXIDINE GLUCONATE 1 APPLICATION(S): 213 SOLUTION TOPICAL at 21:59

## 2021-06-24 RX ADMIN — SODIUM CHLORIDE 3 MILLILITER(S): 9 INJECTION INTRAMUSCULAR; INTRAVENOUS; SUBCUTANEOUS at 21:31

## 2021-06-24 RX ADMIN — Medication 0.25 MILLIGRAM(S): at 22:36

## 2021-06-24 RX ADMIN — Medication 50 MILLIGRAM(S): at 17:47

## 2021-06-24 RX ADMIN — Medication 1000 UNIT(S): at 17:48

## 2021-06-24 RX ADMIN — HEPARIN SODIUM 1200 UNIT(S)/HR: 5000 INJECTION INTRAVENOUS; SUBCUTANEOUS at 11:55
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
## 2021-06-24 NOTE — PROGRESS NOTE ADULT - PROBLEM SELECTOR PLAN 1
Preop for CABG/AVR tomorrow  Patient of Dr Harrington  Continue BB, statin Preop for CABG/AVR tomorrow with Dr Fung  Patient of Dr Harrington  Continue BB, statin  Vein Mapping done

## 2021-06-24 NOTE — PROGRESS NOTE ADULT - PROBLEM SELECTOR PLAN 2
Off Coumadin since Sunday  INR 1.18  On heparin gtt - to be shut off at 4am tomorrow Off Coumadin since Sunday  INR 1.18  On heparin gtt - to be shut off at 5am tomorrow
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
## 2021-06-24 NOTE — PATIENT PROFILE ADULT - BRAND OF COVID-19 VACCINATION
pt states he left card outside willbring back in pt states he left card outside willbring back in/Moderna dose 1 and 2

## 2021-06-24 NOTE — PATIENT PROFILE ADULT - NSPROIMPLANTSMEDDEV_GEN_A_NUR
left elbow 15 screws,   plate in left hip with screws  groin mesh for double hernia repair.  IVC filter./Prosthetic device(s)

## 2021-06-25 ENCOUNTER — RESULT REVIEW (OUTPATIENT)
Age: 86
End: 2021-06-25

## 2021-06-25 ENCOUNTER — APPOINTMENT (OUTPATIENT)
Dept: CARDIOTHORACIC SURGERY | Facility: HOSPITAL | Age: 86
End: 2021-06-25

## 2021-06-25 LAB
ALBUMIN SERPL ELPH-MCNC: 3.1 G/DL — LOW (ref 3.3–5.2)
ALBUMIN SERPL ELPH-MCNC: 3.4 G/DL — SIGNIFICANT CHANGE UP (ref 3.3–5.2)
ALP SERPL-CCNC: 65 U/L — SIGNIFICANT CHANGE UP (ref 40–120)
ALP SERPL-CCNC: 95 U/L — SIGNIFICANT CHANGE UP (ref 40–120)
ALT FLD-CCNC: 10 U/L — SIGNIFICANT CHANGE UP
ALT FLD-CCNC: 9 U/L — SIGNIFICANT CHANGE UP
ANION GAP SERPL CALC-SCNC: 12 MMOL/L — SIGNIFICANT CHANGE UP (ref 5–17)
ANION GAP SERPL CALC-SCNC: 12 MMOL/L — SIGNIFICANT CHANGE UP (ref 5–17)
APTT BLD: 46.3 SEC — HIGH (ref 27.5–35.5)
APTT BLD: 96.2 SEC — HIGH (ref 27.5–35.5)
AST SERPL-CCNC: 15 U/L — SIGNIFICANT CHANGE UP
AST SERPL-CCNC: 29 U/L — SIGNIFICANT CHANGE UP
BASOPHILS # BLD AUTO: 0.01 K/UL — SIGNIFICANT CHANGE UP (ref 0–0.2)
BASOPHILS NFR BLD AUTO: 0.3 % — SIGNIFICANT CHANGE UP (ref 0–2)
BILIRUB SERPL-MCNC: 0.8 MG/DL — SIGNIFICANT CHANGE UP (ref 0.4–2)
BILIRUB SERPL-MCNC: 0.9 MG/DL — SIGNIFICANT CHANGE UP (ref 0.4–2)
BUN SERPL-MCNC: 20.1 MG/DL — HIGH (ref 8–20)
BUN SERPL-MCNC: 24.6 MG/DL — HIGH (ref 8–20)
CALCIUM SERPL-MCNC: 8.7 MG/DL — SIGNIFICANT CHANGE UP (ref 8.6–10.2)
CALCIUM SERPL-MCNC: 8.9 MG/DL — SIGNIFICANT CHANGE UP (ref 8.6–10.2)
CHLORIDE SERPL-SCNC: 104 MMOL/L — SIGNIFICANT CHANGE UP (ref 98–107)
CHLORIDE SERPL-SCNC: 104 MMOL/L — SIGNIFICANT CHANGE UP (ref 98–107)
CK MB CFR SERPL CALC: 31.3 NG/ML — HIGH (ref 0–6.7)
CK SERPL-CCNC: 280 U/L — HIGH (ref 30–200)
CO2 SERPL-SCNC: 24 MMOL/L — SIGNIFICANT CHANGE UP (ref 22–29)
CO2 SERPL-SCNC: 25 MMOL/L — SIGNIFICANT CHANGE UP (ref 22–29)
COVID-19 SPIKE DOMAIN AB INTERP: POSITIVE
COVID-19 SPIKE DOMAIN ANTIBODY RESULT: 49.4 U/ML — HIGH
CREAT SERPL-MCNC: 0.88 MG/DL — SIGNIFICANT CHANGE UP (ref 0.5–1.3)
CREAT SERPL-MCNC: 1.08 MG/DL — SIGNIFICANT CHANGE UP (ref 0.5–1.3)
EOSINOPHIL # BLD AUTO: 0.07 K/UL — SIGNIFICANT CHANGE UP (ref 0–0.5)
EOSINOPHIL NFR BLD AUTO: 1.8 % — SIGNIFICANT CHANGE UP (ref 0–6)
GAS PNL BLDA: SIGNIFICANT CHANGE UP
GAS PNL BLDA: SIGNIFICANT CHANGE UP
GLUCOSE BLDC GLUCOMTR-MCNC: 115 MG/DL — HIGH (ref 70–99)
GLUCOSE BLDC GLUCOMTR-MCNC: 117 MG/DL — HIGH (ref 70–99)
GLUCOSE BLDC GLUCOMTR-MCNC: 124 MG/DL — HIGH (ref 70–99)
GLUCOSE BLDC GLUCOMTR-MCNC: 127 MG/DL — HIGH (ref 70–99)
GLUCOSE BLDC GLUCOMTR-MCNC: 133 MG/DL — HIGH (ref 70–99)
GLUCOSE BLDC GLUCOMTR-MCNC: 136 MG/DL — HIGH (ref 70–99)
GLUCOSE BLDC GLUCOMTR-MCNC: 144 MG/DL — HIGH (ref 70–99)
GLUCOSE BLDC GLUCOMTR-MCNC: 148 MG/DL — HIGH (ref 70–99)
GLUCOSE SERPL-MCNC: 118 MG/DL — HIGH (ref 70–99)
GLUCOSE SERPL-MCNC: 162 MG/DL — HIGH (ref 70–99)
HCT VFR BLD CALC: 29.8 % — LOW (ref 39–50)
HCT VFR BLD CALC: 36.4 % — LOW (ref 39–50)
HGB BLD-MCNC: 10.1 G/DL — LOW (ref 13–17)
HGB BLD-MCNC: 12.3 G/DL — LOW (ref 13–17)
IMM GRANULOCYTES NFR BLD AUTO: 0.5 % — SIGNIFICANT CHANGE UP (ref 0–1.5)
INR BLD: 1.44 RATIO — HIGH (ref 0.88–1.16)
LYMPHOCYTES # BLD AUTO: 0.78 K/UL — LOW (ref 1–3.3)
LYMPHOCYTES # BLD AUTO: 19.7 % — SIGNIFICANT CHANGE UP (ref 13–44)
MAGNESIUM SERPL-MCNC: 2.7 MG/DL — HIGH (ref 1.6–2.6)
MCHC RBC-ENTMCNC: 30.8 PG — SIGNIFICANT CHANGE UP (ref 27–34)
MCHC RBC-ENTMCNC: 30.9 PG — SIGNIFICANT CHANGE UP (ref 27–34)
MCHC RBC-ENTMCNC: 33.8 GM/DL — SIGNIFICANT CHANGE UP (ref 32–36)
MCHC RBC-ENTMCNC: 33.9 GM/DL — SIGNIFICANT CHANGE UP (ref 32–36)
MCV RBC AUTO: 91 FL — SIGNIFICANT CHANGE UP (ref 80–100)
MCV RBC AUTO: 91.1 FL — SIGNIFICANT CHANGE UP (ref 80–100)
MONOCYTES # BLD AUTO: 0.4 K/UL — SIGNIFICANT CHANGE UP (ref 0–0.9)
MONOCYTES NFR BLD AUTO: 10.1 % — SIGNIFICANT CHANGE UP (ref 2–14)
NEUTROPHILS # BLD AUTO: 2.67 K/UL — SIGNIFICANT CHANGE UP (ref 1.8–7.4)
NEUTROPHILS NFR BLD AUTO: 67.6 % — SIGNIFICANT CHANGE UP (ref 43–77)
PLATELET # BLD AUTO: 141 K/UL — LOW (ref 150–400)
PLATELET # BLD AUTO: 150 K/UL — SIGNIFICANT CHANGE UP (ref 150–400)
POTASSIUM SERPL-MCNC: 3.3 MMOL/L — LOW (ref 3.5–5.3)
POTASSIUM SERPL-MCNC: 4.1 MMOL/L — SIGNIFICANT CHANGE UP (ref 3.5–5.3)
POTASSIUM SERPL-SCNC: 3.3 MMOL/L — LOW (ref 3.5–5.3)
POTASSIUM SERPL-SCNC: 4.1 MMOL/L — SIGNIFICANT CHANGE UP (ref 3.5–5.3)
PROT SERPL-MCNC: 5 G/DL — LOW (ref 6.6–8.7)
PROT SERPL-MCNC: 6.4 G/DL — LOW (ref 6.6–8.7)
PROTHROM AB SERPL-ACNC: 16.4 SEC — HIGH (ref 10.6–13.6)
RBC # BLD: 3.27 M/UL — LOW (ref 4.2–5.8)
RBC # BLD: 4 M/UL — LOW (ref 4.2–5.8)
RBC # FLD: 13.2 % — SIGNIFICANT CHANGE UP (ref 10.3–14.5)
RBC # FLD: 13.2 % — SIGNIFICANT CHANGE UP (ref 10.3–14.5)
SARS-COV-2 IGG+IGM SERPL QL IA: 49.4 U/ML — HIGH
SARS-COV-2 IGG+IGM SERPL QL IA: POSITIVE
SODIUM SERPL-SCNC: 140 MMOL/L — SIGNIFICANT CHANGE UP (ref 135–145)
SODIUM SERPL-SCNC: 141 MMOL/L — SIGNIFICANT CHANGE UP (ref 135–145)
TROPONIN T SERPL-MCNC: 0.52 NG/ML — HIGH (ref 0–0.06)
WBC # BLD: 10.61 K/UL — HIGH (ref 3.8–10.5)
WBC # BLD: 3.95 K/UL — SIGNIFICANT CHANGE UP (ref 3.8–10.5)
WBC # FLD AUTO: 10.61 K/UL — HIGH (ref 3.8–10.5)
WBC # FLD AUTO: 3.95 K/UL — SIGNIFICANT CHANGE UP (ref 3.8–10.5)

## 2021-06-25 PROCEDURE — 33405 REPLACEMENT AORTIC VALVE OPN: CPT

## 2021-06-25 PROCEDURE — 76998 US GUIDE INTRAOP: CPT | Mod: 26,59

## 2021-06-25 PROCEDURE — 88305 TISSUE EXAM BY PATHOLOGIST: CPT | Mod: 26

## 2021-06-25 PROCEDURE — 76998 US GUIDE INTRAOP: CPT | Mod: 26,AS,59

## 2021-06-25 PROCEDURE — 33511 CABG VEIN TWO: CPT

## 2021-06-25 PROCEDURE — 88300 SURGICAL PATH GROSS: CPT | Mod: 26

## 2021-06-25 PROCEDURE — 33530 CORONARY ARTERY BYPASS/REOP: CPT

## 2021-06-25 PROCEDURE — 33511 CABG VEIN TWO: CPT | Mod: AS

## 2021-06-25 PROCEDURE — 88311 DECALCIFY TISSUE: CPT | Mod: 26

## 2021-06-25 PROCEDURE — 33530 CORONARY ARTERY BYPASS/REOP: CPT | Mod: AS

## 2021-06-25 PROCEDURE — 33508 ENDOSCOPIC VEIN HARVEST: CPT | Mod: 59

## 2021-06-25 PROCEDURE — 33405 REPLACEMENT AORTIC VALVE OPN: CPT | Mod: AS

## 2021-06-25 PROCEDURE — 71045 X-RAY EXAM CHEST 1 VIEW: CPT | Mod: 26

## 2021-06-25 RX ORDER — ENOXAPARIN SODIUM 100 MG/ML
40 INJECTION SUBCUTANEOUS DAILY
Refills: 0 | Status: COMPLETED | OUTPATIENT
Start: 2021-06-26 | End: 2021-06-27

## 2021-06-25 RX ORDER — NOREPINEPHRINE BITARTRATE/D5W 8 MG/250ML
0.05 PLASTIC BAG, INJECTION (ML) INTRAVENOUS
Qty: 8 | Refills: 0 | Status: DISCONTINUED | OUTPATIENT
Start: 2021-06-25 | End: 2021-06-26

## 2021-06-25 RX ORDER — PANTOPRAZOLE SODIUM 20 MG/1
40 TABLET, DELAYED RELEASE ORAL DAILY
Refills: 0 | Status: DISCONTINUED | OUTPATIENT
Start: 2021-06-26 | End: 2021-06-30

## 2021-06-25 RX ORDER — POTASSIUM CHLORIDE 20 MEQ
10 PACKET (EA) ORAL
Refills: 0 | Status: DISCONTINUED | OUTPATIENT
Start: 2021-06-25 | End: 2021-06-25

## 2021-06-25 RX ORDER — DEXTROSE 50 % IN WATER 50 %
50 SYRINGE (ML) INTRAVENOUS
Refills: 0 | Status: DISCONTINUED | OUTPATIENT
Start: 2021-06-25 | End: 2021-06-26

## 2021-06-25 RX ORDER — HYDRALAZINE HCL 50 MG
10 TABLET ORAL ONCE
Refills: 0 | Status: COMPLETED | OUTPATIENT
Start: 2021-06-25 | End: 2021-06-25

## 2021-06-25 RX ORDER — ONDANSETRON 8 MG/1
4 TABLET, FILM COATED ORAL ONCE
Refills: 0 | Status: DISCONTINUED | OUTPATIENT
Start: 2021-06-25 | End: 2021-06-30

## 2021-06-25 RX ORDER — ACETAMINOPHEN 500 MG
1000 TABLET ORAL ONCE
Refills: 0 | Status: COMPLETED | OUTPATIENT
Start: 2021-06-25 | End: 2021-06-25

## 2021-06-25 RX ORDER — SENNA PLUS 8.6 MG/1
2 TABLET ORAL AT BEDTIME
Refills: 0 | Status: DISCONTINUED | OUTPATIENT
Start: 2021-06-25 | End: 2021-06-30

## 2021-06-25 RX ORDER — ASPIRIN/CALCIUM CARB/MAGNESIUM 324 MG
325 TABLET ORAL DAILY
Refills: 0 | Status: DISCONTINUED | OUTPATIENT
Start: 2021-06-26 | End: 2021-06-30

## 2021-06-25 RX ORDER — CHLORHEXIDINE GLUCONATE 213 G/1000ML
1 SOLUTION TOPICAL DAILY
Refills: 0 | Status: DISCONTINUED | OUTPATIENT
Start: 2021-06-25 | End: 2021-06-29

## 2021-06-25 RX ORDER — VANCOMYCIN HCL 1 G
1250 VIAL (EA) INTRAVENOUS EVERY 12 HOURS
Refills: 0 | Status: COMPLETED | OUTPATIENT
Start: 2021-06-25 | End: 2021-06-27

## 2021-06-25 RX ORDER — POTASSIUM CHLORIDE 20 MEQ
10 PACKET (EA) ORAL
Refills: 0 | Status: COMPLETED | OUTPATIENT
Start: 2021-06-25 | End: 2021-06-25

## 2021-06-25 RX ORDER — HYDROMORPHONE HYDROCHLORIDE 2 MG/ML
0.25 INJECTION INTRAMUSCULAR; INTRAVENOUS; SUBCUTANEOUS ONCE
Refills: 0 | Status: DISCONTINUED | OUTPATIENT
Start: 2021-06-25 | End: 2021-06-25

## 2021-06-25 RX ORDER — PROPOFOL 10 MG/ML
10 INJECTION, EMULSION INTRAVENOUS
Qty: 1000 | Refills: 0 | Status: DISCONTINUED | OUTPATIENT
Start: 2021-06-25 | End: 2021-06-25

## 2021-06-25 RX ORDER — DEXTROSE 50 % IN WATER 50 %
25 SYRINGE (ML) INTRAVENOUS
Refills: 0 | Status: DISCONTINUED | OUTPATIENT
Start: 2021-06-25 | End: 2021-06-26

## 2021-06-25 RX ORDER — SODIUM CHLORIDE 9 MG/ML
1000 INJECTION, SOLUTION INTRAVENOUS ONCE
Refills: 0 | Status: COMPLETED | OUTPATIENT
Start: 2021-06-25 | End: 2021-06-25

## 2021-06-25 RX ORDER — PANTOPRAZOLE SODIUM 20 MG/1
40 TABLET, DELAYED RELEASE ORAL ONCE
Refills: 0 | Status: COMPLETED | OUTPATIENT
Start: 2021-06-25 | End: 2021-06-25

## 2021-06-25 RX ORDER — SODIUM CHLORIDE 9 MG/ML
1000 INJECTION INTRAMUSCULAR; INTRAVENOUS; SUBCUTANEOUS
Refills: 0 | Status: DISCONTINUED | OUTPATIENT
Start: 2021-06-25 | End: 2021-06-29

## 2021-06-25 RX ORDER — POTASSIUM CHLORIDE 20 MEQ
10 PACKET (EA) ORAL
Refills: 0 | Status: DISCONTINUED | OUTPATIENT
Start: 2021-06-25 | End: 2021-06-26

## 2021-06-25 RX ORDER — TAMSULOSIN HYDROCHLORIDE 0.4 MG/1
0.4 CAPSULE ORAL AT BEDTIME
Refills: 0 | Status: DISCONTINUED | OUTPATIENT
Start: 2021-06-25 | End: 2021-06-30

## 2021-06-25 RX ORDER — CEFUROXIME AXETIL 250 MG
1500 TABLET ORAL EVERY 8 HOURS
Refills: 0 | Status: COMPLETED | OUTPATIENT
Start: 2021-06-25 | End: 2021-06-27

## 2021-06-25 RX ORDER — POLYETHYLENE GLYCOL 3350 17 G/17G
17 POWDER, FOR SOLUTION ORAL DAILY
Refills: 0 | Status: DISCONTINUED | OUTPATIENT
Start: 2021-06-27 | End: 2021-06-30

## 2021-06-25 RX ORDER — CHLORHEXIDINE GLUCONATE 213 G/1000ML
15 SOLUTION TOPICAL EVERY 12 HOURS
Refills: 0 | Status: DISCONTINUED | OUTPATIENT
Start: 2021-06-25 | End: 2021-06-25

## 2021-06-25 RX ORDER — ASPIRIN/CALCIUM CARB/MAGNESIUM 324 MG
325 TABLET ORAL ONCE
Refills: 0 | Status: COMPLETED | OUTPATIENT
Start: 2021-06-25 | End: 2021-06-25

## 2021-06-25 RX ORDER — ASCORBIC ACID 60 MG
500 TABLET,CHEWABLE ORAL DAILY
Refills: 0 | Status: DISCONTINUED | OUTPATIENT
Start: 2021-06-25 | End: 2021-06-30

## 2021-06-25 RX ORDER — ATORVASTATIN CALCIUM 80 MG/1
40 TABLET, FILM COATED ORAL AT BEDTIME
Refills: 0 | Status: DISCONTINUED | OUTPATIENT
Start: 2021-06-25 | End: 2021-06-30

## 2021-06-25 RX ORDER — INSULIN HUMAN 100 [IU]/ML
2 INJECTION, SOLUTION SUBCUTANEOUS
Qty: 50 | Refills: 0 | Status: DISCONTINUED | OUTPATIENT
Start: 2021-06-25 | End: 2021-06-26

## 2021-06-25 RX ORDER — FENTANYL CITRATE 50 UG/ML
50 INJECTION INTRAVENOUS
Refills: 0 | Status: DISCONTINUED | OUTPATIENT
Start: 2021-06-25 | End: 2021-06-25

## 2021-06-25 RX ORDER — MULTIVIT-MIN/FERROUS GLUCONATE 9 MG/15 ML
1 LIQUID (ML) ORAL DAILY
Refills: 0 | Status: DISCONTINUED | OUTPATIENT
Start: 2021-06-25 | End: 2021-06-30

## 2021-06-25 RX ADMIN — PANTOPRAZOLE SODIUM 40 MILLIGRAM(S): 20 TABLET, DELAYED RELEASE ORAL at 13:16

## 2021-06-25 RX ADMIN — Medication 100 MILLIEQUIVALENT(S): at 14:00

## 2021-06-25 RX ADMIN — TAMSULOSIN HYDROCHLORIDE 0.4 MILLIGRAM(S): 0.4 CAPSULE ORAL at 21:27

## 2021-06-25 RX ADMIN — Medication 7.73 MICROGRAM(S)/KG/MIN: at 13:17

## 2021-06-25 RX ADMIN — Medication 500 MILLIGRAM(S): at 21:27

## 2021-06-25 RX ADMIN — INSULIN HUMAN 2 UNIT(S)/HR: 100 INJECTION, SOLUTION SUBCUTANEOUS at 13:17

## 2021-06-25 RX ADMIN — CHLORHEXIDINE GLUCONATE 15 MILLILITER(S): 213 SOLUTION TOPICAL at 17:53

## 2021-06-25 RX ADMIN — Medication 1 TABLET(S): at 21:27

## 2021-06-25 RX ADMIN — CHLORHEXIDINE GLUCONATE 1 APPLICATION(S): 213 SOLUTION TOPICAL at 13:19

## 2021-06-25 RX ADMIN — PROPOFOL 4.95 MICROGRAM(S)/KG/MIN: 10 INJECTION, EMULSION INTRAVENOUS at 13:17

## 2021-06-25 RX ADMIN — SODIUM CHLORIDE 1000 MILLILITER(S): 9 INJECTION, SOLUTION INTRAVENOUS at 16:35

## 2021-06-25 RX ADMIN — SENNA PLUS 2 TABLET(S): 8.6 TABLET ORAL at 21:27

## 2021-06-25 RX ADMIN — Medication 100 MILLIEQUIVALENT(S): at 13:00

## 2021-06-25 RX ADMIN — SODIUM CHLORIDE 5 MILLILITER(S): 9 INJECTION INTRAMUSCULAR; INTRAVENOUS; SUBCUTANEOUS at 13:18

## 2021-06-25 RX ADMIN — ATORVASTATIN CALCIUM 40 MILLIGRAM(S): 80 TABLET, FILM COATED ORAL at 21:27

## 2021-06-25 RX ADMIN — Medication 50 MILLIGRAM(S): at 04:49

## 2021-06-25 RX ADMIN — AMLODIPINE BESYLATE 10 MILLIGRAM(S): 2.5 TABLET ORAL at 04:49

## 2021-06-25 RX ADMIN — Medication 1000 MILLIGRAM(S): at 20:15

## 2021-06-25 RX ADMIN — Medication 100 MILLIEQUIVALENT(S): at 19:45

## 2021-06-25 RX ADMIN — CHLORHEXIDINE GLUCONATE 1 APPLICATION(S): 213 SOLUTION TOPICAL at 04:25

## 2021-06-25 RX ADMIN — Medication 10 MILLIGRAM(S): at 23:35

## 2021-06-25 RX ADMIN — SODIUM CHLORIDE 10 MILLILITER(S): 9 INJECTION INTRAMUSCULAR; INTRAVENOUS; SUBCUTANEOUS at 13:18

## 2021-06-25 RX ADMIN — HYDROMORPHONE HYDROCHLORIDE 0.25 MILLIGRAM(S): 2 INJECTION INTRAMUSCULAR; INTRAVENOUS; SUBCUTANEOUS at 21:20

## 2021-06-25 RX ADMIN — HYDROMORPHONE HYDROCHLORIDE 0.25 MILLIGRAM(S): 2 INJECTION INTRAMUSCULAR; INTRAVENOUS; SUBCUTANEOUS at 20:50

## 2021-06-25 RX ADMIN — Medication 400 MILLIGRAM(S): at 19:45

## 2021-06-25 RX ADMIN — Medication 166.67 MILLIGRAM(S): at 20:11

## 2021-06-25 RX ADMIN — HYDROMORPHONE HYDROCHLORIDE 0.25 MILLIGRAM(S): 2 INJECTION INTRAMUSCULAR; INTRAVENOUS; SUBCUTANEOUS at 20:41

## 2021-06-25 RX ADMIN — Medication 100 MILLIEQUIVALENT(S): at 15:15

## 2021-06-25 RX ADMIN — Medication 100 MILLIGRAM(S): at 15:15

## 2021-06-25 RX ADMIN — SODIUM CHLORIDE 3 MILLILITER(S): 9 INJECTION INTRAMUSCULAR; INTRAVENOUS; SUBCUTANEOUS at 04:26

## 2021-06-25 RX ADMIN — Medication 325 MILLIGRAM(S): at 21:28

## 2021-06-25 RX ADMIN — Medication 10 MILLIGRAM(S): at 22:23

## 2021-06-25 RX ADMIN — HYDROMORPHONE HYDROCHLORIDE 0.25 MILLIGRAM(S): 2 INJECTION INTRAMUSCULAR; INTRAVENOUS; SUBCUTANEOUS at 20:11

## 2021-06-25 RX ADMIN — CHLORHEXIDINE GLUCONATE 15 MILLILITER(S): 213 SOLUTION TOPICAL at 05:19

## 2021-06-25 RX ADMIN — SODIUM CHLORIDE 1000 MILLILITER(S): 9 INJECTION, SOLUTION INTRAVENOUS at 15:15

## 2021-06-25 RX ADMIN — MUPIROCIN 1 APPLICATION(S): 20 OINTMENT TOPICAL at 04:52

## 2021-06-25 NOTE — BRIEF OPERATIVE NOTE - NSICDXBRIEFPREOP_GEN_ALL_CORE_FT
PRE-OP DIAGNOSIS:  CAD in native artery 25-Jun-2021 12:30:04  Bimal Sanchez  Severe aortic stenosis 25-Jun-2021 12:30:26  Bimal Sanchez

## 2021-06-25 NOTE — CONSULT NOTE ADULT - ATTENDING COMMENTS
Seen and examined by myself. Known to Dr. Wing who discussed the case with me. Severe conduction disease pre-op. Will likely need pacing support  agree with above

## 2021-06-25 NOTE — BRIEF OPERATIVE NOTE - COMMENTS
Cell Saver Utilized - Unable to Quantify Blood Loss  Invasive Lines: Rt Radial [place], Lt Femoral [place], Rt IJ Central Line  IV Medication Infusions: Levo, Propofol, Insulin  No qualified resident was available to assist in this case. I have personally first assisted the Cardiothoracic Surgeon listed in this brief op note throughout the entirety of this case.
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
## 2021-06-25 NOTE — BRIEF OPERATIVE NOTE - NSICDXBRIEFPOSTOP_GEN_ALL_CORE_FT
POST-OP DIAGNOSIS:  CAD in native artery 25-Jun-2021 12:30:38  Bimal Sanchez  Severe aortic stenosis 25-Jun-2021 12:30:49  Bimal Sanchez

## 2021-06-25 NOTE — BRIEF OPERATIVE NOTE - NSICDXBRIEFPROCEDURE_GEN_ALL_CORE_FT
PROCEDURES:  CABG, using 2 venous grafts 25-Jun-2021 12:27:32  Bimal Sanchez  Aortic valve replacement, tissue 25-Jun-2021 12:29:24  Bimal Sanchez  Repeat sternotomy 25-Jun-2021 12:29:48  Bimal Sanchez

## 2021-06-26 ENCOUNTER — NON-APPOINTMENT (OUTPATIENT)
Age: 86
End: 2021-06-26

## 2021-06-26 DIAGNOSIS — Z29.9 ENCOUNTER FOR PROPHYLACTIC MEASURES, UNSPECIFIED: ICD-10-CM

## 2021-06-26 LAB
ALBUMIN SERPL ELPH-MCNC: 3.6 G/DL — SIGNIFICANT CHANGE UP (ref 3.3–5.2)
ALP SERPL-CCNC: 76 U/L — SIGNIFICANT CHANGE UP (ref 40–120)
ALT FLD-CCNC: 13 U/L — SIGNIFICANT CHANGE UP
ANION GAP SERPL CALC-SCNC: 10 MMOL/L — SIGNIFICANT CHANGE UP (ref 5–17)
AST SERPL-CCNC: 44 U/L — HIGH
BILIRUB SERPL-MCNC: 0.7 MG/DL — SIGNIFICANT CHANGE UP (ref 0.4–2)
BUN SERPL-MCNC: 22.4 MG/DL — HIGH (ref 8–20)
CALCIUM SERPL-MCNC: 8.7 MG/DL — SIGNIFICANT CHANGE UP (ref 8.6–10.2)
CHLORIDE SERPL-SCNC: 105 MMOL/L — SIGNIFICANT CHANGE UP (ref 98–107)
CK MB CFR SERPL CALC: 29 NG/ML — HIGH (ref 0–6.7)
CK SERPL-CCNC: 484 U/L — HIGH (ref 30–200)
CO2 SERPL-SCNC: 23 MMOL/L — SIGNIFICANT CHANGE UP (ref 22–29)
CREAT SERPL-MCNC: 1.02 MG/DL — SIGNIFICANT CHANGE UP (ref 0.5–1.3)
GLUCOSE BLDC GLUCOMTR-MCNC: 124 MG/DL — HIGH (ref 70–99)
GLUCOSE BLDC GLUCOMTR-MCNC: 124 MG/DL — HIGH (ref 70–99)
GLUCOSE BLDC GLUCOMTR-MCNC: 127 MG/DL — HIGH (ref 70–99)
GLUCOSE BLDC GLUCOMTR-MCNC: 135 MG/DL — HIGH (ref 70–99)
GLUCOSE BLDC GLUCOMTR-MCNC: 138 MG/DL — HIGH (ref 70–99)
GLUCOSE BLDC GLUCOMTR-MCNC: 140 MG/DL — HIGH (ref 70–99)
GLUCOSE BLDC GLUCOMTR-MCNC: 147 MG/DL — HIGH (ref 70–99)
GLUCOSE BLDC GLUCOMTR-MCNC: 158 MG/DL — HIGH (ref 70–99)
GLUCOSE BLDC GLUCOMTR-MCNC: 161 MG/DL — HIGH (ref 70–99)
GLUCOSE SERPL-MCNC: 143 MG/DL — HIGH (ref 70–99)
HCT VFR BLD CALC: 33.1 % — LOW (ref 39–50)
HGB BLD-MCNC: 11.3 G/DL — LOW (ref 13–17)
INR BLD: 1.19 RATIO — HIGH (ref 0.88–1.16)
MAGNESIUM SERPL-MCNC: 2.3 MG/DL — SIGNIFICANT CHANGE UP (ref 1.6–2.6)
MCHC RBC-ENTMCNC: 30.6 PG — SIGNIFICANT CHANGE UP (ref 27–34)
MCHC RBC-ENTMCNC: 34.1 GM/DL — SIGNIFICANT CHANGE UP (ref 32–36)
MCV RBC AUTO: 89.7 FL — SIGNIFICANT CHANGE UP (ref 80–100)
PLATELET # BLD AUTO: 168 K/UL — SIGNIFICANT CHANGE UP (ref 150–400)
POTASSIUM SERPL-MCNC: 4.1 MMOL/L — SIGNIFICANT CHANGE UP (ref 3.5–5.3)
POTASSIUM SERPL-SCNC: 4.1 MMOL/L — SIGNIFICANT CHANGE UP (ref 3.5–5.3)
PROT SERPL-MCNC: 6 G/DL — LOW (ref 6.6–8.7)
PROTHROM AB SERPL-ACNC: 13.7 SEC — HIGH (ref 10.6–13.6)
RBC # BLD: 3.69 M/UL — LOW (ref 4.2–5.8)
RBC # FLD: 13.5 % — SIGNIFICANT CHANGE UP (ref 10.3–14.5)
SODIUM SERPL-SCNC: 138 MMOL/L — SIGNIFICANT CHANGE UP (ref 135–145)
TROPONIN T SERPL-MCNC: 0.61 NG/ML — HIGH (ref 0–0.06)
WBC # BLD: 12.63 K/UL — HIGH (ref 3.8–10.5)
WBC # FLD AUTO: 12.63 K/UL — HIGH (ref 3.8–10.5)

## 2021-06-26 PROCEDURE — 93010 ELECTROCARDIOGRAM REPORT: CPT

## 2021-06-26 PROCEDURE — 71045 X-RAY EXAM CHEST 1 VIEW: CPT | Mod: 26

## 2021-06-26 PROCEDURE — 99232 SBSQ HOSP IP/OBS MODERATE 35: CPT

## 2021-06-26 RX ORDER — OXYCODONE HYDROCHLORIDE 5 MG/1
10 TABLET ORAL EVERY 4 HOURS
Refills: 0 | Status: DISCONTINUED | OUTPATIENT
Start: 2021-06-26 | End: 2021-06-30

## 2021-06-26 RX ORDER — ACETAMINOPHEN 500 MG
650 TABLET ORAL EVERY 6 HOURS
Refills: 0 | Status: DISCONTINUED | OUTPATIENT
Start: 2021-06-26 | End: 2021-06-30

## 2021-06-26 RX ORDER — OXYCODONE HYDROCHLORIDE 5 MG/1
5 TABLET ORAL EVERY 4 HOURS
Refills: 0 | Status: DISCONTINUED | OUTPATIENT
Start: 2021-06-26 | End: 2021-06-30

## 2021-06-26 RX ORDER — METOPROLOL TARTRATE 50 MG
25 TABLET ORAL ONCE
Refills: 0 | Status: COMPLETED | OUTPATIENT
Start: 2021-06-26 | End: 2021-06-26

## 2021-06-26 RX ORDER — WARFARIN SODIUM 2.5 MG/1
2 TABLET ORAL ONCE
Refills: 0 | Status: COMPLETED | OUTPATIENT
Start: 2021-06-26 | End: 2021-06-26

## 2021-06-26 RX ORDER — INSULIN LISPRO 100/ML
VIAL (ML) SUBCUTANEOUS
Refills: 0 | Status: DISCONTINUED | OUTPATIENT
Start: 2021-06-26 | End: 2021-06-29

## 2021-06-26 RX ORDER — METOPROLOL TARTRATE 50 MG
25 TABLET ORAL
Refills: 0 | Status: DISCONTINUED | OUTPATIENT
Start: 2021-06-26 | End: 2021-06-26

## 2021-06-26 RX ADMIN — OXYCODONE HYDROCHLORIDE 5 MILLIGRAM(S): 5 TABLET ORAL at 09:21

## 2021-06-26 RX ADMIN — Medication 325 MILLIGRAM(S): at 13:05

## 2021-06-26 RX ADMIN — ENOXAPARIN SODIUM 40 MILLIGRAM(S): 100 INJECTION SUBCUTANEOUS at 13:05

## 2021-06-26 RX ADMIN — PANTOPRAZOLE SODIUM 40 MILLIGRAM(S): 20 TABLET, DELAYED RELEASE ORAL at 13:04

## 2021-06-26 RX ADMIN — Medication 100 MILLIGRAM(S): at 17:18

## 2021-06-26 RX ADMIN — Medication 1 TABLET(S): at 13:04

## 2021-06-26 RX ADMIN — Medication 100 MILLIGRAM(S): at 07:54

## 2021-06-26 RX ADMIN — TAMSULOSIN HYDROCHLORIDE 0.4 MILLIGRAM(S): 0.4 CAPSULE ORAL at 22:11

## 2021-06-26 RX ADMIN — CHLORHEXIDINE GLUCONATE 1 APPLICATION(S): 213 SOLUTION TOPICAL at 13:05

## 2021-06-26 RX ADMIN — OXYCODONE HYDROCHLORIDE 5 MILLIGRAM(S): 5 TABLET ORAL at 10:00

## 2021-06-26 RX ADMIN — Medication 100 MILLIGRAM(S): at 01:00

## 2021-06-26 RX ADMIN — ATORVASTATIN CALCIUM 40 MILLIGRAM(S): 80 TABLET, FILM COATED ORAL at 22:10

## 2021-06-26 RX ADMIN — Medication 500 MILLIGRAM(S): at 13:04

## 2021-06-26 RX ADMIN — Medication 166.67 MILLIGRAM(S): at 07:54

## 2021-06-26 RX ADMIN — SENNA PLUS 2 TABLET(S): 8.6 TABLET ORAL at 22:10

## 2021-06-26 RX ADMIN — WARFARIN SODIUM 2 MILLIGRAM(S): 2.5 TABLET ORAL at 22:10

## 2021-06-26 RX ADMIN — Medication 166.67 MILLIGRAM(S): at 20:16

## 2021-06-26 RX ADMIN — Medication 25 MILLIGRAM(S): at 03:11

## 2021-06-26 NOTE — PROGRESS NOTE ADULT - PROBLEM SELECTOR PLAN 2
Start Lovenox today  Will d/w team in am rounds when to resume Coumadin and to consider therapeutic Lovenox until PPM

## 2021-06-26 NOTE — PROGRESS NOTE ADULT - SUBJECTIVE AND OBJECTIVE BOX
KAYLEIGH HOLLEYMYRA  [id number]      Chief Complaint:  S/p CABG/AVR    Interval History:  Patient c/o pain at chest tube sites.  Denies other c/o now.  Denies SOB/palps.    Tele:  No events      acetaminophen   Tablet .. 650 milliGRAM(s) Oral every 6 hours PRN  ascorbic acid 500 milliGRAM(s) Oral daily  aspirin enteric coated 325 milliGRAM(s) Oral daily  atorvastatin 40 milliGRAM(s) Oral at bedtime  cefuroxime  IVPB 1500 milliGRAM(s) IV Intermittent every 8 hours  chlorhexidine 2% Cloths 1 Application(s) Topical daily  enoxaparin Injectable 40 milliGRAM(s) SubCutaneous daily  insulin lispro (ADMELOG) corrective regimen sliding scale   SubCutaneous Before meals and at bedtime  multivitamin/minerals 1 Tablet(s) Oral daily  ondansetron Injectable 4 milliGRAM(s) IV Push once  oxyCODONE    IR 5 milliGRAM(s) Oral every 4 hours PRN  oxyCODONE    IR 10 milliGRAM(s) Oral every 4 hours PRN  pantoprazole    Tablet 40 milliGRAM(s) Oral daily  senna 2 Tablet(s) Oral at bedtime  sodium chloride 0.9%. 1000 milliLiter(s) IV Continuous <Continuous>  sodium chloride 0.9%. 1000 milliLiter(s) IV Continuous <Continuous>  tamsulosin 0.4 milliGRAM(s) Oral at bedtime  vancomycin  IVPB 1250 milliGRAM(s) IV Intermittent every 12 hours  warfarin 2 milliGRAM(s) Oral once          Physical Exam:  T(C): 36.8 (06-26-21 @ 09:00), Max: 37 (06-25-21 @ 20:00)  HR: 96 (06-26-21 @ 11:00) (63 - 104)  BP: 119/67 (06-26-21 @ 11:00) (119/67 - 141/70)  RR: 34 (06-26-21 @ 11:00) (9 - 38)  SpO2: 95% (06-26-21 @ 11:00) (94% - 99%)  Wt(kg): --  General: Comfortable in NAD  Neck: No JVD  CVS: nl s1s2, no s3, sternotomy incision c/d/i  Pulm: CTA b/l  Abd: soft, non-tender  Ext: No c/c/e  Neuro A&O x3  Psych: Normal affect      Labs:   26 Jun 2021 03:16    138    |  105    |  22.4   ----------------------------<  143    4.1     |  23.0   |  1.02     Ca    8.7        26 Jun 2021 03:16  Mg     2.3       26 Jun 2021 03:16    TPro  6.0    /  Alb  3.6    /  TBili  0.7    /  DBili  x      /  AST  44     /  ALT  13     /  AlkPhos  76     26 Jun 2021 03:16                          11.3   12.63 )-----------( 168      ( 26 Jun 2021 03:16 )             33.1     PT/INR - ( 26 Jun 2021 03:16 )   PT: 13.7 sec;   INR: 1.19 ratio         PTT - ( 25 Jun 2021 12:40 )  PTT:46.3 sec  CARDIAC MARKERS ( 26 Jun 2021 03:16 )  x     / 0.61 ng/mL / 484 U/L / x     / 29.0 ng/mL  CARDIAC MARKERS ( 25 Jun 2021 12:40 )  x     / 0.52 ng/mL / 280 U/L / x     / 31.3 ng/mL          Assessment:  85yMale PMHX CAD, AS, HTN, HLD variable AVB presented as OP for CABG/AVR.  Now s/p surgery, tolerated well.  Had CHB intraop but now with good conduction.  Will likely need PPM still.  BP a bit elevated appears mostly euvolemic.      Plan:  1. Care per CTS/CTICU.  2. Diurese as needed.  3. Continue other current CV meds at current doses.  4. OOB as tolerated.    D/w RN.

## 2021-06-26 NOTE — PROGRESS NOTE ADULT - PROBLEM SELECTOR PLAN 1
s/p Reop Sternotomy, AVR, C2V on 6/25 with Dr. Fung  No inotropic support needed  Cardene for hypertension  Start Lopressor 25 mg BID  Continue ASA for acute graft patency  Continue statin for anti inflammatory properties  Pt will need PPM, planned for 6/28  Encourage IS hourly while awake  Ambulate with PT assist daily
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
## 2021-06-27 LAB
ANION GAP SERPL CALC-SCNC: 7 MMOL/L — SIGNIFICANT CHANGE UP (ref 5–17)
BUN SERPL-MCNC: 33.3 MG/DL — HIGH (ref 8–20)
CALCIUM SERPL-MCNC: 8.6 MG/DL — SIGNIFICANT CHANGE UP (ref 8.6–10.2)
CHLORIDE SERPL-SCNC: 102 MMOL/L — SIGNIFICANT CHANGE UP (ref 98–107)
CO2 SERPL-SCNC: 25 MMOL/L — SIGNIFICANT CHANGE UP (ref 22–29)
CREAT SERPL-MCNC: 0.93 MG/DL — SIGNIFICANT CHANGE UP (ref 0.5–1.3)
GLUCOSE BLDC GLUCOMTR-MCNC: 146 MG/DL — HIGH (ref 70–99)
GLUCOSE BLDC GLUCOMTR-MCNC: 156 MG/DL — HIGH (ref 70–99)
GLUCOSE BLDC GLUCOMTR-MCNC: 200 MG/DL — HIGH (ref 70–99)
GLUCOSE SERPL-MCNC: 142 MG/DL — HIGH (ref 70–99)
HCT VFR BLD CALC: 29.9 % — LOW (ref 39–50)
HGB BLD-MCNC: 10.1 G/DL — LOW (ref 13–17)
INR BLD: 1.28 RATIO — HIGH (ref 0.88–1.16)
MAGNESIUM SERPL-MCNC: 2.2 MG/DL — SIGNIFICANT CHANGE UP (ref 1.6–2.6)
MCHC RBC-ENTMCNC: 30.8 PG — SIGNIFICANT CHANGE UP (ref 27–34)
MCHC RBC-ENTMCNC: 33.8 GM/DL — SIGNIFICANT CHANGE UP (ref 32–36)
MCV RBC AUTO: 91.2 FL — SIGNIFICANT CHANGE UP (ref 80–100)
PLATELET # BLD AUTO: 120 K/UL — LOW (ref 150–400)
POTASSIUM SERPL-MCNC: 4.4 MMOL/L — SIGNIFICANT CHANGE UP (ref 3.5–5.3)
POTASSIUM SERPL-SCNC: 4.4 MMOL/L — SIGNIFICANT CHANGE UP (ref 3.5–5.3)
PROTHROM AB SERPL-ACNC: 14.7 SEC — HIGH (ref 10.6–13.6)
RBC # BLD: 3.28 M/UL — LOW (ref 4.2–5.8)
RBC # FLD: 14.1 % — SIGNIFICANT CHANGE UP (ref 10.3–14.5)
SODIUM SERPL-SCNC: 134 MMOL/L — LOW (ref 135–145)
WBC # BLD: 12.12 K/UL — HIGH (ref 3.8–10.5)
WBC # FLD AUTO: 12.12 K/UL — HIGH (ref 3.8–10.5)

## 2021-06-27 PROCEDURE — 71045 X-RAY EXAM CHEST 1 VIEW: CPT | Mod: 26,77

## 2021-06-27 PROCEDURE — 71045 X-RAY EXAM CHEST 1 VIEW: CPT | Mod: 26

## 2021-06-27 PROCEDURE — 99232 SBSQ HOSP IP/OBS MODERATE 35: CPT

## 2021-06-27 PROCEDURE — 93010 ELECTROCARDIOGRAM REPORT: CPT

## 2021-06-27 RX ORDER — WARFARIN SODIUM 2.5 MG/1
2 TABLET ORAL ONCE
Refills: 0 | Status: COMPLETED | OUTPATIENT
Start: 2021-06-27 | End: 2021-06-27

## 2021-06-27 RX ORDER — FUROSEMIDE 40 MG
20 TABLET ORAL ONCE
Refills: 0 | Status: COMPLETED | OUTPATIENT
Start: 2021-06-27 | End: 2021-06-27

## 2021-06-27 RX ADMIN — Medication 1: at 16:32

## 2021-06-27 RX ADMIN — Medication 100 MILLIGRAM(S): at 08:37

## 2021-06-27 RX ADMIN — Medication 325 MILLIGRAM(S): at 12:20

## 2021-06-27 RX ADMIN — WARFARIN SODIUM 2 MILLIGRAM(S): 2.5 TABLET ORAL at 22:17

## 2021-06-27 RX ADMIN — Medication 20 MILLIGRAM(S): at 04:31

## 2021-06-27 RX ADMIN — TAMSULOSIN HYDROCHLORIDE 0.4 MILLIGRAM(S): 0.4 CAPSULE ORAL at 22:17

## 2021-06-27 RX ADMIN — Medication 1: at 12:24

## 2021-06-27 RX ADMIN — Medication 100 MILLIGRAM(S): at 00:17

## 2021-06-27 RX ADMIN — Medication 166.67 MILLIGRAM(S): at 08:37

## 2021-06-27 RX ADMIN — Medication 500 MILLIGRAM(S): at 12:20

## 2021-06-27 RX ADMIN — ENOXAPARIN SODIUM 40 MILLIGRAM(S): 100 INJECTION SUBCUTANEOUS at 12:20

## 2021-06-27 RX ADMIN — SENNA PLUS 2 TABLET(S): 8.6 TABLET ORAL at 22:18

## 2021-06-27 RX ADMIN — Medication 1 TABLET(S): at 12:19

## 2021-06-27 RX ADMIN — CHLORHEXIDINE GLUCONATE 1 APPLICATION(S): 213 SOLUTION TOPICAL at 12:20

## 2021-06-27 RX ADMIN — PANTOPRAZOLE SODIUM 40 MILLIGRAM(S): 20 TABLET, DELAYED RELEASE ORAL at 12:20

## 2021-06-27 RX ADMIN — ATORVASTATIN CALCIUM 40 MILLIGRAM(S): 80 TABLET, FILM COATED ORAL at 22:18
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
## 2021-06-27 NOTE — PROGRESS NOTE ADULT - SUBJECTIVE AND OBJECTIVE BOX
KAYLEIGH ALCANTARA  [id number]      Chief Complaint:  S/p CABG/AVR    Interval History:  Patient feels well, no c/o.  No pain at this time.  Denies SOB/palps.    Tele:  SR, intermittent pacing, some high degree AVB          acetaminophen   Tablet .. 650 milliGRAM(s) Oral every 6 hours PRN  ascorbic acid 500 milliGRAM(s) Oral daily  aspirin enteric coated 325 milliGRAM(s) Oral daily  atorvastatin 40 milliGRAM(s) Oral at bedtime  chlorhexidine 2% Cloths 1 Application(s) Topical daily  enoxaparin Injectable 40 milliGRAM(s) SubCutaneous daily  insulin lispro (ADMELOG) corrective regimen sliding scale   SubCutaneous Before meals and at bedtime  multivitamin/minerals 1 Tablet(s) Oral daily  ondansetron Injectable 4 milliGRAM(s) IV Push once  oxyCODONE    IR 5 milliGRAM(s) Oral every 4 hours PRN  oxyCODONE    IR 10 milliGRAM(s) Oral every 4 hours PRN  pantoprazole    Tablet 40 milliGRAM(s) Oral daily  polyethylene glycol 3350 17 Gram(s) Oral daily PRN  senna 2 Tablet(s) Oral at bedtime  sodium chloride 0.9%. 1000 milliLiter(s) IV Continuous <Continuous>  sodium chloride 0.9%. 1000 milliLiter(s) IV Continuous <Continuous>  tamsulosin 0.4 milliGRAM(s) Oral at bedtime  warfarin 2 milliGRAM(s) Oral once          Physical Exam:  T(C): 37.1 (06-27-21 @ 03:00), Max: 37.6 (06-26-21 @ 18:00)  HR: 85 (06-27-21 @ 07:00) (52 - 108)  BP: 132/69 (06-27-21 @ 06:00) (100/61 - 167/77)  RR: 15 (06-27-21 @ 07:00) (11 - 34)  SpO2: 92% (06-27-21 @ 07:00) (91% - 95%)  Wt(kg): --  General: Comfortable in NAD  Neck: No JVD  CVS: nl s1s2, no s3, sternotomy incision c/d/i  Pulm: CTA b/l  Abd: soft, non-tender  Ext: No c/c/e  Neuro A&O x3  Psych: Normal affect      Labs:   27 Jun 2021 03:08    134    |  102    |  33.3   ----------------------------<  142    4.4     |  25.0   |  0.93     Ca    8.6        27 Jun 2021 03:08  Mg     2.2       27 Jun 2021 03:08    TPro  6.0    /  Alb  3.6    /  TBili  0.7    /  DBili  x      /  AST  44     /  ALT  13     /  AlkPhos  76     26 Jun 2021 03:16                          10.1   12.12 )-----------( 120      ( 27 Jun 2021 03:08 )             29.9     PT/INR - ( 27 Jun 2021 03:08 )   PT: 14.7 sec;   INR: 1.28 ratio         PTT - ( 25 Jun 2021 12:40 )  PTT:46.3 sec  CARDIAC MARKERS ( 26 Jun 2021 03:16 )  x     / 0.61 ng/mL / 484 U/L / x     / 29.0 ng/mL  CARDIAC MARKERS ( 25 Jun 2021 12:40 )  x     / 0.52 ng/mL / 280 U/L / x     / 31.3 ng/mL            Assessment:  85yMale PMHX CAD, AS, HTN, HLD variable AVB presented as OP for CABG/AVR.  Now s/p surgery, tolerated well.  Had CHB intraop but now with good conduction.  Will likely need PPM still.  BP a bit elevated appears mostly euvolemic.      Plan:  1. Care per CTS/CTICU.  2. Diurese as needed.  3. Continue other current CV meds at current doses.  Hold BB for now.  4. PPM tomorrow per EP.  5. OOB as tolerated.

## 2021-06-27 NOTE — PROGRESS NOTE ADULT - PROBLEM SELECTOR PLAN 1
s/p Reop Sternotomy, AVR, C2V on 6/25 with Dr. Fung  No beta blockers  Continue ASA for acute graft patency  Continue statin for anti inflammatory properties  Pt will need PPM, planned for 6/28  NPO after midnight on 6/27  Encourage IS hourly while awake  Ambulate with PT assist daily

## 2021-06-27 NOTE — DIETITIAN INITIAL EVALUATION ADULT. - PERTINENT LABORATORY DATA
06-27 Na134 mmol/L<L> Glu 142 mg/dL<H> K+ 4.4 mmol/L Cr  0.93 mg/dL BUN 33.3 mg/dL<H> Phos n/a   Alb n/a   PAB n/a
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
## 2021-06-27 NOTE — CHART NOTE - NSCHARTNOTEFT_GEN_A_CORE
Please keep NPO past midnight for planned permanent pacemaker tomorrow with Dr. Rodney. Last lovenox dose to be given this evening. No lovenox in the AM tomorrow. Please keep NPO past midnight for planned permanent pacemaker tomorrow with Dr. Rodney. Last lovenox dose to be given this evening. No lovenox in the AM tomorrow. Continue warfarin.

## 2021-06-28 ENCOUNTER — TRANSCRIPTION ENCOUNTER (OUTPATIENT)
Age: 86
End: 2021-06-28

## 2021-06-28 LAB
ANION GAP SERPL CALC-SCNC: 7 MMOL/L — SIGNIFICANT CHANGE UP (ref 5–17)
BLD GP AB SCN SERPL QL: SIGNIFICANT CHANGE UP
BUN SERPL-MCNC: 34.9 MG/DL — HIGH (ref 8–20)
CALCIUM SERPL-MCNC: 8.3 MG/DL — LOW (ref 8.6–10.2)
CHLORIDE SERPL-SCNC: 105 MMOL/L — SIGNIFICANT CHANGE UP (ref 98–107)
CO2 SERPL-SCNC: 26 MMOL/L — SIGNIFICANT CHANGE UP (ref 22–29)
CREAT SERPL-MCNC: 0.85 MG/DL — SIGNIFICANT CHANGE UP (ref 0.5–1.3)
GLUCOSE BLDC GLUCOMTR-MCNC: 123 MG/DL — HIGH (ref 70–99)
GLUCOSE BLDC GLUCOMTR-MCNC: 143 MG/DL — HIGH (ref 70–99)
GLUCOSE SERPL-MCNC: 141 MG/DL — HIGH (ref 70–99)
HCT VFR BLD CALC: 29.3 % — LOW (ref 39–50)
HGB BLD-MCNC: 10 G/DL — LOW (ref 13–17)
INR BLD: 1.29 RATIO — HIGH (ref 0.88–1.16)
MAGNESIUM SERPL-MCNC: 2.1 MG/DL — SIGNIFICANT CHANGE UP (ref 1.8–2.6)
MCHC RBC-ENTMCNC: 31.1 PG — SIGNIFICANT CHANGE UP (ref 27–34)
MCHC RBC-ENTMCNC: 34.1 GM/DL — SIGNIFICANT CHANGE UP (ref 32–36)
MCV RBC AUTO: 91 FL — SIGNIFICANT CHANGE UP (ref 80–100)
PLATELET # BLD AUTO: 107 K/UL — LOW (ref 150–400)
POTASSIUM SERPL-MCNC: 4 MMOL/L — SIGNIFICANT CHANGE UP (ref 3.5–5.3)
POTASSIUM SERPL-SCNC: 4 MMOL/L — SIGNIFICANT CHANGE UP (ref 3.5–5.3)
PROTHROM AB SERPL-ACNC: 14.8 SEC — HIGH (ref 10.6–13.6)
RBC # BLD: 3.22 M/UL — LOW (ref 4.2–5.8)
RBC # FLD: 14 % — SIGNIFICANT CHANGE UP (ref 10.3–14.5)
SODIUM SERPL-SCNC: 137 MMOL/L — SIGNIFICANT CHANGE UP (ref 135–145)
WBC # BLD: 10.74 K/UL — HIGH (ref 3.8–10.5)
WBC # FLD AUTO: 10.74 K/UL — HIGH (ref 3.8–10.5)

## 2021-06-28 PROCEDURE — 93010 ELECTROCARDIOGRAM REPORT: CPT

## 2021-06-28 PROCEDURE — 71045 X-RAY EXAM CHEST 1 VIEW: CPT | Mod: 26

## 2021-06-28 PROCEDURE — 99232 SBSQ HOSP IP/OBS MODERATE 35: CPT

## 2021-06-28 PROCEDURE — 99233 SBSQ HOSP IP/OBS HIGH 50: CPT | Mod: 25

## 2021-06-28 PROCEDURE — 99232 SBSQ HOSP IP/OBS MODERATE 35: CPT | Mod: 24

## 2021-06-28 PROCEDURE — 33208 INSRT HEART PM ATRIAL & VENT: CPT | Mod: KX

## 2021-06-28 RX ORDER — METOPROLOL TARTRATE 50 MG
5 TABLET ORAL ONCE
Refills: 0 | Status: COMPLETED | OUTPATIENT
Start: 2021-06-28 | End: 2021-06-28

## 2021-06-28 RX ORDER — SODIUM CHLORIDE 9 MG/ML
3 INJECTION INTRAMUSCULAR; INTRAVENOUS; SUBCUTANEOUS EVERY 8 HOURS
Refills: 0 | Status: DISCONTINUED | OUTPATIENT
Start: 2021-06-28 | End: 2021-06-30

## 2021-06-28 RX ORDER — CEFAZOLIN SODIUM 1 G
2000 VIAL (EA) INJECTION EVERY 8 HOURS
Refills: 0 | Status: COMPLETED | OUTPATIENT
Start: 2021-06-28 | End: 2021-06-29

## 2021-06-28 RX ORDER — WARFARIN SODIUM 2.5 MG/1
3 TABLET ORAL ONCE
Refills: 0 | Status: COMPLETED | OUTPATIENT
Start: 2021-06-28 | End: 2021-06-28

## 2021-06-28 RX ORDER — METOPROLOL TARTRATE 50 MG
25 TABLET ORAL
Refills: 0 | Status: DISCONTINUED | OUTPATIENT
Start: 2021-06-28 | End: 2021-06-29

## 2021-06-28 RX ADMIN — Medication 650 MILLIGRAM(S): at 22:48

## 2021-06-28 RX ADMIN — Medication 500 MILLIGRAM(S): at 22:47

## 2021-06-28 RX ADMIN — Medication 25 MILLIGRAM(S): at 22:46

## 2021-06-28 RX ADMIN — Medication 5 MILLIGRAM(S): at 20:30

## 2021-06-28 RX ADMIN — CHLORHEXIDINE GLUCONATE 1 APPLICATION(S): 213 SOLUTION TOPICAL at 19:30

## 2021-06-28 RX ADMIN — SENNA PLUS 2 TABLET(S): 8.6 TABLET ORAL at 22:47

## 2021-06-28 RX ADMIN — WARFARIN SODIUM 3 MILLIGRAM(S): 2.5 TABLET ORAL at 22:46

## 2021-06-28 RX ADMIN — Medication 1 TABLET(S): at 22:47

## 2021-06-28 RX ADMIN — SODIUM CHLORIDE 3 MILLILITER(S): 9 INJECTION INTRAMUSCULAR; INTRAVENOUS; SUBCUTANEOUS at 22:47

## 2021-06-28 RX ADMIN — PANTOPRAZOLE SODIUM 40 MILLIGRAM(S): 20 TABLET, DELAYED RELEASE ORAL at 14:42

## 2021-06-28 RX ADMIN — Medication 100 MILLIGRAM(S): at 23:43

## 2021-06-28 RX ADMIN — Medication 650 MILLIGRAM(S): at 22:47

## 2021-06-28 RX ADMIN — TAMSULOSIN HYDROCHLORIDE 0.4 MILLIGRAM(S): 0.4 CAPSULE ORAL at 22:46

## 2021-06-28 RX ADMIN — ATORVASTATIN CALCIUM 40 MILLIGRAM(S): 80 TABLET, FILM COATED ORAL at 22:47

## 2021-06-28 RX ADMIN — Medication 325 MILLIGRAM(S): at 22:47
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
## 2021-06-28 NOTE — DISCHARGE NOTE NURSING/CASE MANAGEMENT/SOCIAL WORK - NSDCFUADDAPPT_GEN_ALL_CORE_FT
Please follow up with your primary care physician: Dr Greyson Bell in [place] [phone]  Please follow up with your cardiothoracic surgeon: Dr Minh Fung in [place] [phone]  Please follow up with your cardiologist: Dr Taz Harrington in [place] [phone]  The pt uses [facility] on [address] in [place] [phone]  The pt does not have any difficulties in obtaining his Prescriptions/medications

## 2021-06-28 NOTE — PROGRESS NOTE ADULT - SUBJECTIVE AND OBJECTIVE BOX
KAYLEIGH ALCANTARA  [id number]      Chief Complaint:  S/p CABG/AVR    Interval History:  Patient feels well, no c/o.  No pain at this time.  Ambulated with a little SOB.  Denies palps.    Tele:  SR, intermittent pacing        acetaminophen   Tablet .. 650 milliGRAM(s) Oral every 6 hours PRN  ascorbic acid 500 milliGRAM(s) Oral daily  aspirin enteric coated 325 milliGRAM(s) Oral daily  atorvastatin 40 milliGRAM(s) Oral at bedtime  chlorhexidine 2% Cloths 1 Application(s) Topical daily  insulin lispro (ADMELOG) corrective regimen sliding scale   SubCutaneous Before meals and at bedtime  multivitamin/minerals 1 Tablet(s) Oral daily  ondansetron Injectable 4 milliGRAM(s) IV Push once  oxyCODONE    IR 5 milliGRAM(s) Oral every 4 hours PRN  oxyCODONE    IR 10 milliGRAM(s) Oral every 4 hours PRN  pantoprazole    Tablet 40 milliGRAM(s) Oral daily  polyethylene glycol 3350 17 Gram(s) Oral daily PRN  senna 2 Tablet(s) Oral at bedtime  sodium chloride 0.9%. 1000 milliLiter(s) IV Continuous <Continuous>  sodium chloride 0.9%. 1000 milliLiter(s) IV Continuous <Continuous>  tamsulosin 0.4 milliGRAM(s) Oral at bedtime  warfarin 3 milliGRAM(s) Oral once          Physical Exam:  T(C): 36.7 (06-28-21 @ 04:00), Max: 37.3 (06-27-21 @ 12:00)  HR: 94 (06-28-21 @ 10:00) (81 - 126)  BP: 150/74 (06-28-21 @ 08:00) (106/58 - 156/69)  RR: 14 (06-28-21 @ 10:00) (13 - 28)  SpO2: 92% (06-28-21 @ 10:00) (90% - 96%)  Wt(kg): --  General: Comfortable in NAD  Neck: No JVD  CVS: nl s1s2, no s3, sternotomy incision c/d/i  Pulm: CTA b/l  Abd: soft, non-tender  Ext: No c/c/e  Neuro A&O x3  Psych: Normal affect      Labs:   28 Jun 2021 02:50    137    |  105    |  34.9   ----------------------------<  141    4.0     |  26.0   |  0.85     Ca    8.3        28 Jun 2021 02:50  Mg     2.1       28 Jun 2021 02:50                            10.0   10.74 )-----------( 107      ( 28 Jun 2021 02:50 )             29.3     PT/INR - ( 28 Jun 2021 02:50 )   PT: 14.8 sec;   INR: 1.29 ratio             Assessment:  85yMale PMHX CAD, AS, HTN, HLD variable AVB presented as OP for CABG/AVR.  Now s/p surgery, tolerated well.  Had CHB intraop but now with good conduction.  BP controlled and appears mostly euvolemic.  Plan for PPM today.    Plan:  1. Care per CTS/CTICU.  2. Continue other current CV meds at current doses.  Hold BB for now.  3. PPM today per EP.  4. OOB as tolerated.
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
## 2021-06-28 NOTE — DISCHARGE NOTE NURSING/CASE MANAGEMENT/SOCIAL WORK - PATIENT PORTAL LINK FT
You can access the FollowMyHealth Patient Portal offered by [facility] by registering at the following website: [email or web address]. By joining [facility]’s FollowMyHealth portal, you will also be able to view your health information using other applications (apps) compatible with our system.

## 2021-06-28 NOTE — PHYSICAL THERAPY INITIAL EVALUATION ADULT - ADDITIONAL COMMENTS
as per pt: resides in the private house with 2 steps (+) rail to enter, and 13 steps (+) rail to the bedroom, (+) driving, outdoor ambulation with SAC, wife available to assist as needed

## 2021-06-28 NOTE — PHYSICAL THERAPY INITIAL EVALUATION ADULT - ACTIVE RANGE OF MOTION EXAMINATION, REHAB EVAL
assessed during functional mobility, resistance not applied, within surgical precautions/bilateral upper extremity Active ROM was WFL (within functional limits)/bilateral  lower extremity Active ROM was WFL (within functional limits)
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
## 2021-06-29 LAB
ANION GAP SERPL CALC-SCNC: 7 MMOL/L — SIGNIFICANT CHANGE UP (ref 5–17)
BUN SERPL-MCNC: 25.5 MG/DL — HIGH (ref 8–20)
CALCIUM SERPL-MCNC: 8.2 MG/DL — LOW (ref 8.6–10.2)
CHLORIDE SERPL-SCNC: 104 MMOL/L — SIGNIFICANT CHANGE UP (ref 98–107)
CO2 SERPL-SCNC: 27 MMOL/L — SIGNIFICANT CHANGE UP (ref 22–29)
CREAT SERPL-MCNC: 0.87 MG/DL — SIGNIFICANT CHANGE UP (ref 0.5–1.3)
GLUCOSE BLDC GLUCOMTR-MCNC: 125 MG/DL — HIGH (ref 70–99)
GLUCOSE BLDC GLUCOMTR-MCNC: 126 MG/DL — HIGH (ref 70–99)
GLUCOSE BLDC GLUCOMTR-MCNC: 131 MG/DL — HIGH (ref 70–99)
GLUCOSE SERPL-MCNC: 134 MG/DL — HIGH (ref 70–99)
HCT VFR BLD CALC: 28.9 % — LOW (ref 39–50)
HGB BLD-MCNC: 9.8 G/DL — LOW (ref 13–17)
INR BLD: 1.24 RATIO — HIGH (ref 0.88–1.16)
MAGNESIUM SERPL-MCNC: 2 MG/DL — SIGNIFICANT CHANGE UP (ref 1.6–2.6)
MCHC RBC-ENTMCNC: 30.7 PG — SIGNIFICANT CHANGE UP (ref 27–34)
MCHC RBC-ENTMCNC: 33.9 GM/DL — SIGNIFICANT CHANGE UP (ref 32–36)
MCV RBC AUTO: 90.6 FL — SIGNIFICANT CHANGE UP (ref 80–100)
PLATELET # BLD AUTO: 104 K/UL — LOW (ref 150–400)
POTASSIUM SERPL-MCNC: 3.7 MMOL/L — SIGNIFICANT CHANGE UP (ref 3.5–5.3)
POTASSIUM SERPL-SCNC: 3.7 MMOL/L — SIGNIFICANT CHANGE UP (ref 3.5–5.3)
PROTHROM AB SERPL-ACNC: 14.2 SEC — HIGH (ref 10.6–13.6)
RBC # BLD: 3.19 M/UL — LOW (ref 4.2–5.8)
RBC # FLD: 13.9 % — SIGNIFICANT CHANGE UP (ref 10.3–14.5)
SODIUM SERPL-SCNC: 138 MMOL/L — SIGNIFICANT CHANGE UP (ref 135–145)
WBC # BLD: 6.91 K/UL — SIGNIFICANT CHANGE UP (ref 3.8–10.5)
WBC # FLD AUTO: 6.91 K/UL — SIGNIFICANT CHANGE UP (ref 3.8–10.5)

## 2021-06-29 PROCEDURE — 71046 X-RAY EXAM CHEST 2 VIEWS: CPT | Mod: 26

## 2021-06-29 PROCEDURE — 99232 SBSQ HOSP IP/OBS MODERATE 35: CPT | Mod: 24

## 2021-06-29 PROCEDURE — 99232 SBSQ HOSP IP/OBS MODERATE 35: CPT

## 2021-06-29 PROCEDURE — 71045 X-RAY EXAM CHEST 1 VIEW: CPT | Mod: 26,59

## 2021-06-29 RX ORDER — WARFARIN SODIUM 2.5 MG/1
3 TABLET ORAL ONCE
Refills: 0 | Status: DISCONTINUED | OUTPATIENT
Start: 2021-06-29 | End: 2021-06-29

## 2021-06-29 RX ORDER — WARFARIN SODIUM 2.5 MG/1
6 TABLET ORAL ONCE
Refills: 0 | Status: COMPLETED | OUTPATIENT
Start: 2021-06-29 | End: 2021-06-29

## 2021-06-29 RX ORDER — METOPROLOL TARTRATE 50 MG
50 TABLET ORAL
Refills: 0 | Status: DISCONTINUED | OUTPATIENT
Start: 2021-06-29 | End: 2021-06-30

## 2021-06-29 RX ORDER — POTASSIUM CHLORIDE 20 MEQ
40 PACKET (EA) ORAL ONCE
Refills: 0 | Status: COMPLETED | OUTPATIENT
Start: 2021-06-29 | End: 2021-06-29

## 2021-06-29 RX ADMIN — ATORVASTATIN CALCIUM 40 MILLIGRAM(S): 80 TABLET, FILM COATED ORAL at 21:08

## 2021-06-29 RX ADMIN — Medication 1 TABLET(S): at 10:34

## 2021-06-29 RX ADMIN — TAMSULOSIN HYDROCHLORIDE 0.4 MILLIGRAM(S): 0.4 CAPSULE ORAL at 21:08

## 2021-06-29 RX ADMIN — WARFARIN SODIUM 6 MILLIGRAM(S): 2.5 TABLET ORAL at 21:08

## 2021-06-29 RX ADMIN — Medication 500 MILLIGRAM(S): at 10:33

## 2021-06-29 RX ADMIN — Medication 325 MILLIGRAM(S): at 10:33

## 2021-06-29 RX ADMIN — SODIUM CHLORIDE 3 MILLILITER(S): 9 INJECTION INTRAMUSCULAR; INTRAVENOUS; SUBCUTANEOUS at 13:37

## 2021-06-29 RX ADMIN — Medication 40 MILLIEQUIVALENT(S): at 05:54

## 2021-06-29 RX ADMIN — SODIUM CHLORIDE 3 MILLILITER(S): 9 INJECTION INTRAMUSCULAR; INTRAVENOUS; SUBCUTANEOUS at 21:07

## 2021-06-29 RX ADMIN — Medication 100 MILLIGRAM(S): at 10:34

## 2021-06-29 RX ADMIN — PANTOPRAZOLE SODIUM 40 MILLIGRAM(S): 20 TABLET, DELAYED RELEASE ORAL at 10:33

## 2021-06-29 RX ADMIN — Medication 25 MILLIGRAM(S): at 05:54

## 2021-06-29 RX ADMIN — Medication 50 MILLIGRAM(S): at 18:12

## 2021-06-29 RX ADMIN — CHLORHEXIDINE GLUCONATE 1 APPLICATION(S): 213 SOLUTION TOPICAL at 13:37

## 2021-06-29 RX ADMIN — SODIUM CHLORIDE 3 MILLILITER(S): 9 INJECTION INTRAMUSCULAR; INTRAVENOUS; SUBCUTANEOUS at 05:14

## 2021-06-29 NOTE — CHART NOTE - NSCHARTNOTEFT_GEN_A_CORE
Alerted by CTS ACP to strips representing Rhythmiq algorithm pacing.   After review this feature has been disabled and AV search hysteresis turned on. These are attempts to reduce RV pacing  APC and PVCs noted. Increase beta blockers as tolerated.

## 2021-06-29 NOTE — PROGRESS NOTE ADULT - ATTENDING COMMENTS
seen and examined by myself. Patient had preop Bifascicular block plus first degree AVB and high grade AVB on cardiac monitoring. He has LAGUNA but never had syncope or presyncope. Role of prophylactic pacing support was discussed before in the office with Dr. Helm and again by myself today. We all agreed that the safest is to proceed with PPM implant.     Risks discussed and include but not limited to infection, bleeding, dislodgment, pneumothorax, cardiac perforation, stroke, and death. He provided informed consent
seen and examined, agree with above  can consider BB tomorrow if device check is ok and deemed indicated by primary team(s)
seen and examined, device check, wound and CXr are acceptable   instructions provided and will plan outpatient follow up  started on metoprolol
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
## 2021-06-29 NOTE — PROGRESS NOTE ADULT - PROBLEM SELECTOR PLAN 1
s/p Reop Sternotomy, AVR, C2V on 6/25 with Dr. Fung  beta blockers resumed   Continue ASA for acute graft patency  Continue statin for anti inflammatory properties  Encourage IS hourly while awake  Ambulate with PT assist daily

## 2021-06-29 NOTE — PROGRESS NOTE ADULT - SUBJECTIVE AND OBJECTIVE BOX
KAYLEIGH ALCANTARA  [id number]      Chief Complaint: CABG/AVR      Interval History:      Tele:      Current meds:   acetaminophen   Tablet .. 650 milliGRAM(s) Oral every 6 hours PRN  ascorbic acid 500 milliGRAM(s) Oral daily  aspirin enteric coated 325 milliGRAM(s) Oral daily  atorvastatin 40 milliGRAM(s) Oral at bedtime  chlorhexidine 2% Cloths 1 Application(s) Topical daily  insulin lispro (ADMELOG) corrective regimen sliding scale   SubCutaneous Before meals and at bedtime  metoprolol tartrate 25 milliGRAM(s) Oral two times a day  multivitamin/minerals 1 Tablet(s) Oral daily  ondansetron Injectable 4 milliGRAM(s) IV Push once  oxyCODONE    IR 5 milliGRAM(s) Oral every 4 hours PRN  oxyCODONE    IR 10 milliGRAM(s) Oral every 4 hours PRN  pantoprazole    Tablet 40 milliGRAM(s) Oral daily  polyethylene glycol 3350 17 Gram(s) Oral daily PRN  senna 2 Tablet(s) Oral at bedtime  sodium chloride 0.9% lock flush 3 milliLiter(s) IV Push every 8 hours  sodium chloride 0.9%. 1000 milliLiter(s) IV Continuous <Continuous>  sodium chloride 0.9%. 1000 milliLiter(s) IV Continuous <Continuous>  tamsulosin 0.4 milliGRAM(s) Oral at bedtime  warfarin 3 milliGRAM(s) Oral once      Objective:     Vital Signs:   T(C): 36.6 (06-29-21 @ 11:00), Max: 36.7 (06-28-21 @ 15:31)  HR: 91 (06-29-21 @ 11:00) (76 - 107)  BP: 149/73 (06-29-21 @ 11:00) (114/68 - 168/84)  RR: 18 (06-29-21 @ 11:00) (10 - 28)  SpO2: 95% (06-29-21 @ 11:00) (94% - 98%)  Wt(kg): --    Physical Exam:   General: Comfortable in NAD  Neck: No JVD  CVS: nl s1s2, no s3  Pulm: CTA b/l  Abd: soft, non-tender  Ext: No c/c/e  Neuro A&O x3  Psych: Normal affect      Labs:   29 Jun 2021 03:09    138    |  104    |  25.5   ----------------------------<  134    3.7     |  27.0   |  0.87     Ca    8.2        29 Jun 2021 03:09  Mg     2.0       29 Jun 2021 03:09                            9.8    6.91  )-----------( 104      ( 29 Jun 2021 03:09 )             28.9     PT/INR - ( 29 Jun 2021 03:09 )   PT: 14.2 sec;   INR: 1.24 ratio             ECG (6/28/21):     Home Cardiac Meds:  Coumadin  Atorvastatin 20 mg daily  Irbesartan 300 mg daily  Metoprolol succinate 50 mg BID  Amlodipine 10 mg daily   KAYLEIGH ALCANTARA  [id number]      Chief Complaint: CABG/AVR      Interval History:      Tele:      Current meds:   acetaminophen   Tablet .. 650 milliGRAM(s) Oral every 6 hours PRN  ascorbic acid 500 milliGRAM(s) Oral daily  aspirin enteric coated 325 milliGRAM(s) Oral daily  atorvastatin 40 milliGRAM(s) Oral at bedtime  chlorhexidine 2% Cloths 1 Application(s) Topical daily  insulin lispro (ADMELOG) corrective regimen sliding scale   SubCutaneous Before meals and at bedtime  metoprolol tartrate 25 milliGRAM(s) Oral two times a day  multivitamin/minerals 1 Tablet(s) Oral daily  ondansetron Injectable 4 milliGRAM(s) IV Push once  oxyCODONE    IR 5 milliGRAM(s) Oral every 4 hours PRN  oxyCODONE    IR 10 milliGRAM(s) Oral every 4 hours PRN  pantoprazole    Tablet 40 milliGRAM(s) Oral daily  polyethylene glycol 3350 17 Gram(s) Oral daily PRN  senna 2 Tablet(s) Oral at bedtime  sodium chloride 0.9% lock flush 3 milliLiter(s) IV Push every 8 hours  sodium chloride 0.9%. 1000 milliLiter(s) IV Continuous <Continuous>  sodium chloride 0.9%. 1000 milliLiter(s) IV Continuous <Continuous>  tamsulosin 0.4 milliGRAM(s) Oral at bedtime  warfarin 3 milliGRAM(s) Oral once      Objective:     Vital Signs:   T(C): 36.6 (06-29-21 @ 11:00), Max: 36.7 (06-28-21 @ 15:31)  HR: 91 (06-29-21 @ 11:00) (76 - 107)  BP: 149/73 (06-29-21 @ 11:00) (114/68 - 168/84)  RR: 18 (06-29-21 @ 11:00) (10 - 28)  SpO2: 95% (06-29-21 @ 11:00) (94% - 98%)  Wt(kg): --    Physical Exam:   General: Comfortable in NAD  Neck: No JVD  CVS: nl s1s2, no s3  Pulm: CTA b/l  Abd: soft, non-tender  Ext: No c/c/e  Neuro A&O x3  Psych: Normal affect      Labs:   29 Jun 2021 03:09    138    |  104    |  25.5   ----------------------------<  134    3.7     |  27.0   |  0.87     Ca    8.2        29 Jun 2021 03:09  Mg     2.0       29 Jun 2021 03:09                            9.8    6.91  )-----------( 104      ( 29 Jun 2021 03:09 )             28.9     PT/INR - ( 29 Jun 2021 03:09 )   PT: 14.2 sec;   INR: 1.24 ratio         TTE (4/2021):   1. 3D LVEF is 71%.   2. Normal global left ventricular systolic function.   3. Spectral Doppler shows impaired relaxation pattern of left ventricular myocardial filling (Grade I diastolic dysfunction).   4. There is severe concentric left ventricular hypertrophy.   5. Mildly increased RV wall thickness with normal RV size and function (3D RVEF 54%).   6. Normal left atrial size.   7. Normal right atrial size.   8. Lipomatous hypertrophy and hypermobility of the intra-atrial septum.   9. Mild-moderate tricuspid regurgitation.  10. Mild to moderate pulmonic valve regurgitation.  11. Trace mitral valve regurgitation.  12. Moderate thickening of the anterior and posterior mitral valve leaflets.  13. Peak transaortic gradient equals 46.4 mmHg, mean transaortic gradient equals 31.8 mmHg, the calculated aortic valve area equals 0.80 cm² by the continuity equation consistent with severe aortic stenosis.  14. Mild dilatation of the aortic root (3.9 cm) and ascending aorta (4.1 cm).    Cardiac Cath (5/2021):  DIAGNOSTIC IMPRESSIONS: Severe LM, ostial circumflex/OM1 and LAD disease.  Severe AS by TTE.  DIAGNOSTIC RECOMMENDATIONS: CABG, AVR referral.    ECG (6/28/21): sinus rhythm, first degree AV block, bifascicular block     Home Cardiac Meds:  Coumadin  Atorvastatin 20 mg daily  Irbesartan 300 mg daily  Metoprolol succinate 50 mg BID  Amlodipine 10 mg daily   KAYLEIGH ALCANTARA  [id number]      Chief Complaint: CABG/AVR & PPM placement    Interval History: The patient reports feeling well. Denies angina or dyspnea. Denies dizziness.     Tele: sinus rhythm       Current meds:   acetaminophen   Tablet .. 650 milliGRAM(s) Oral every 6 hours PRN  ascorbic acid 500 milliGRAM(s) Oral daily  aspirin enteric coated 325 milliGRAM(s) Oral daily  atorvastatin 40 milliGRAM(s) Oral at bedtime  chlorhexidine 2% Cloths 1 Application(s) Topical daily  insulin lispro (ADMELOG) corrective regimen sliding scale   SubCutaneous Before meals and at bedtime  metoprolol tartrate 25 milliGRAM(s) Oral two times a day  multivitamin/minerals 1 Tablet(s) Oral daily  ondansetron Injectable 4 milliGRAM(s) IV Push once  oxyCODONE    IR 5 milliGRAM(s) Oral every 4 hours PRN  oxyCODONE    IR 10 milliGRAM(s) Oral every 4 hours PRN  pantoprazole    Tablet 40 milliGRAM(s) Oral daily  polyethylene glycol 3350 17 Gram(s) Oral daily PRN  senna 2 Tablet(s) Oral at bedtime  sodium chloride 0.9% lock flush 3 milliLiter(s) IV Push every 8 hours  sodium chloride 0.9%. 1000 milliLiter(s) IV Continuous <Continuous>  sodium chloride 0.9%. 1000 milliLiter(s) IV Continuous <Continuous>  tamsulosin 0.4 milliGRAM(s) Oral at bedtime  warfarin 3 milliGRAM(s) Oral once      Objective:     Vital Signs:   T(C): 36.6 (06-29-21 @ 11:00), Max: 36.7 (06-28-21 @ 15:31)  HR: 91 (06-29-21 @ 11:00) (76 - 107)  BP: 149/73 (06-29-21 @ 11:00) (114/68 - 168/84)  RR: 18 (06-29-21 @ 11:00) (10 - 28)  SpO2: 95% (06-29-21 @ 11:00) (94% - 98%)  Wt(kg): --    Physical Exam:   General: elderly man, laying in bed, no distress  Neck: supple  Chest: sternal bandage dressing in place  CVS: JVP ~ 7 cm H20, RRR, s1, s2, no murmurs  Pulm: unlabored respirations, CTAB  Abd: non-distended  Ext: no lower extremity edema   Neuro: awake, alert & oriented   Psych: Normal affect      Labs:   29 Jun 2021 03:09    138    |  104    |  25.5   ----------------------------<  134    3.7     |  27.0   |  0.87     Ca    8.2        29 Jun 2021 03:09  Mg     2.0       29 Jun 2021 03:09                            9.8    6.91  )-----------( 104      ( 29 Jun 2021 03:09 )             28.9     PT/INR - ( 29 Jun 2021 03:09 )   PT: 14.2 sec;   INR: 1.24 ratio         TTE (4/2021):   1. 3D LVEF is 71%.   2. Normal global left ventricular systolic function.   3. Spectral Doppler shows impaired relaxation pattern of left ventricular myocardial filling (Grade I diastolic dysfunction).   4. There is severe concentric left ventricular hypertrophy.   5. Mildly increased RV wall thickness with normal RV size and function (3D RVEF 54%).   6. Normal left atrial size.   7. Normal right atrial size.   8. Lipomatous hypertrophy and hypermobility of the intra-atrial septum.   9. Mild-moderate tricuspid regurgitation.  10. Mild to moderate pulmonic valve regurgitation.  11. Trace mitral valve regurgitation.  12. Moderate thickening of the anterior and posterior mitral valve leaflets.  13. Peak transaortic gradient equals 46.4 mmHg, mean transaortic gradient equals 31.8 mmHg, the calculated aortic valve area equals 0.80 cm² by the continuity equation consistent with severe aortic stenosis.  14. Mild dilatation of the aortic root (3.9 cm) and ascending aorta (4.1 cm).    Cardiac Cath (5/2021):  DIAGNOSTIC IMPRESSIONS: Severe LM, ostial circumflex/OM1 and LAD disease.  Severe AS by TTE.  DIAGNOSTIC RECOMMENDATIONS: CABG, AVR referral.    ECG (6/28/21): sinus rhythm, first degree AV block, bifascicular block     Home Cardiac Meds:  Coumadin  Atorvastatin 20 mg daily  Irbesartan 300 mg daily  Metoprolol succinate 50 mg BID  Amlodipine 10 mg daily
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
## 2021-06-29 NOTE — PROGRESS NOTE ADULT - ASSESSMENT
Assessment:  Tyshawn Prado is an 85 year old man with past medical history of Coronary artery disease, Aortic stenosis, DVT (on coumadin), Hypertension, Hyperlipidemia and prior AV block who presented electively for CABG & AVR. Patient had intraoperative complete heart block.     ppm Assessment:  Tyshawn Prado is an 85 year old man with past medical history of Coronary artery disease, Severe Aortic valve stenosis, DVT (on coumadin), Hypertension, Hyperlipidemia and prior AV block who presented electively for CABG & AVR, now s/p repeat sternotomy with CABG x2 (SVG-OM, SVG-LAD) and Valencia #25mm AVR. Patient had intraoperative complete heart block and is now s/p dual chamber pacemaker implant.     Patient appears well today, no signs of decompensated heart failure on physical exam. Telemetry reviewed and consistent with sinus rhythm.    Recommendations:  [] CAD: Now s/p CABG, currently on Aspirin 325 mg daily, now started on Metoprolol Tartrate 25 mg BID. Further post-surgical care per CT surgery team.   [] Severe aortic valve stenosis: Now s/p Valencia #25 AVR. Patient on Aspirin 325 mg daily. Further surveillance imaging as outpatient.   [] Intraoperative complete heart block: Now s/p dual chamber pacemaker. Now resumed on beta blocker and tolerating. Follow up EP recommendations    Thank you for the consult. No further inpatient cardiac workup recommended at this time. Recommended out of bed as tolerated. The patient should follow-up in cardiology office in 1-2 weeks. Discussed with patient and wife at bedside.     Ramon Guadarrama MD  Cardiology   Assessment:  Tyshawn Prado is an 85 year old man with past medical history of Coronary artery disease, Severe Aortic valve stenosis, DVT (on coumadin), Hypertension, Hyperlipidemia and prior AV block who presented electively for CABG & AVR, now s/p repeat sternotomy with CABG x2 (SVG-OM, SVG-LAD) and Valencia #25mm AVR. Patient had intraoperative complete heart block and is now s/p dual chamber pacemaker implant.     Patient appears well today, no signs of decompensated heart failure on physical exam. Telemetry reviewed and consistent with sinus rhythm.    Recommendations:  [] CAD: Now s/p CABG, currently on Aspirin 325 mg daily, now started on Metoprolol Tartrate 25 mg BID. Further post-surgical care per CT surgery team.   [] Severe aortic valve stenosis: Now s/p Valencia #25 AVR. Patient on Aspirin 325 mg daily. Further surveillance imaging as outpatient.   [] Intraoperative complete heart block: Now s/p dual chamber pacemaker. Follow up EP recommendations    Thank you for the consult. No further inpatient cardiac workup recommended at this time. Recommended out of bed as tolerated. The patient should follow-up in cardiology office in 1-2 weeks. Discussed with patient and wife at bedside.     Ramon Guadarrama MD  Cardiology

## 2021-06-30 ENCOUNTER — TRANSCRIPTION ENCOUNTER (OUTPATIENT)
Age: 86
End: 2021-06-30

## 2021-06-30 ENCOUNTER — NON-APPOINTMENT (OUTPATIENT)
Age: 86
End: 2021-06-30

## 2021-06-30 VITALS
SYSTOLIC BLOOD PRESSURE: 131 MMHG | TEMPERATURE: 98 F | RESPIRATION RATE: 18 BRPM | HEART RATE: 101 BPM | OXYGEN SATURATION: 95 % | DIASTOLIC BLOOD PRESSURE: 80 MMHG

## 2021-06-30 LAB
ANION GAP SERPL CALC-SCNC: 9 MMOL/L — SIGNIFICANT CHANGE UP (ref 5–17)
APTT BLD: 29.4 SEC — SIGNIFICANT CHANGE UP (ref 27.5–35.5)
BUN SERPL-MCNC: 21 MG/DL — HIGH (ref 8–20)
CALCIUM SERPL-MCNC: 8.7 MG/DL — SIGNIFICANT CHANGE UP (ref 8.6–10.2)
CHLORIDE SERPL-SCNC: 103 MMOL/L — SIGNIFICANT CHANGE UP (ref 98–107)
CO2 SERPL-SCNC: 28 MMOL/L — SIGNIFICANT CHANGE UP (ref 22–29)
CREAT SERPL-MCNC: 0.82 MG/DL — SIGNIFICANT CHANGE UP (ref 0.5–1.3)
GLUCOSE SERPL-MCNC: 119 MG/DL — HIGH (ref 70–99)
HCT VFR BLD CALC: 30.5 % — LOW (ref 39–50)
HGB BLD-MCNC: 10.2 G/DL — LOW (ref 13–17)
INR BLD: 1.18 RATIO — HIGH (ref 0.88–1.16)
MAGNESIUM SERPL-MCNC: 1.9 MG/DL — SIGNIFICANT CHANGE UP (ref 1.6–2.6)
MCHC RBC-ENTMCNC: 30.6 PG — SIGNIFICANT CHANGE UP (ref 27–34)
MCHC RBC-ENTMCNC: 33.4 GM/DL — SIGNIFICANT CHANGE UP (ref 32–36)
MCV RBC AUTO: 91.6 FL — SIGNIFICANT CHANGE UP (ref 80–100)
PLATELET # BLD AUTO: 109 K/UL — LOW (ref 150–400)
POTASSIUM SERPL-MCNC: 4.3 MMOL/L — SIGNIFICANT CHANGE UP (ref 3.5–5.3)
POTASSIUM SERPL-SCNC: 4.3 MMOL/L — SIGNIFICANT CHANGE UP (ref 3.5–5.3)
PROTHROM AB SERPL-ACNC: 13.6 SEC — SIGNIFICANT CHANGE UP (ref 10.6–13.6)
RBC # BLD: 3.33 M/UL — LOW (ref 4.2–5.8)
RBC # FLD: 13.8 % — SIGNIFICANT CHANGE UP (ref 10.3–14.5)
SODIUM SERPL-SCNC: 140 MMOL/L — SIGNIFICANT CHANGE UP (ref 135–145)
SURGICAL PATHOLOGY STUDY: SIGNIFICANT CHANGE UP
WBC # BLD: 7.29 K/UL — SIGNIFICANT CHANGE UP (ref 3.8–10.5)
WBC # FLD AUTO: 7.29 K/UL — SIGNIFICANT CHANGE UP (ref 3.8–10.5)

## 2021-06-30 PROCEDURE — C1889: CPT

## 2021-06-30 PROCEDURE — 99024 POSTOP FOLLOW-UP VISIT: CPT

## 2021-06-30 PROCEDURE — 82947 ASSAY GLUCOSE BLOOD QUANT: CPT

## 2021-06-30 PROCEDURE — 84295 ASSAY OF SERUM SODIUM: CPT

## 2021-06-30 PROCEDURE — P9037: CPT

## 2021-06-30 PROCEDURE — 87635 SARS-COV-2 COVID-19 AMP PRB: CPT

## 2021-06-30 PROCEDURE — 81001 URINALYSIS AUTO W/SCOPE: CPT

## 2021-06-30 PROCEDURE — 36415 COLL VENOUS BLD VENIPUNCTURE: CPT

## 2021-06-30 PROCEDURE — 33208 INSRT HEART PM ATRIAL & VENT: CPT

## 2021-06-30 PROCEDURE — 86901 BLOOD TYPING SEROLOGIC RH(D): CPT

## 2021-06-30 PROCEDURE — 86900 BLOOD TYPING SEROLOGIC ABO: CPT

## 2021-06-30 PROCEDURE — 82330 ASSAY OF CALCIUM: CPT

## 2021-06-30 PROCEDURE — 97163 PT EVAL HIGH COMPLEX 45 MIN: CPT

## 2021-06-30 PROCEDURE — 83735 ASSAY OF MAGNESIUM: CPT

## 2021-06-30 PROCEDURE — 85027 COMPLETE CBC AUTOMATED: CPT

## 2021-06-30 PROCEDURE — 84443 ASSAY THYROID STIM HORMONE: CPT

## 2021-06-30 PROCEDURE — 71045 X-RAY EXAM CHEST 1 VIEW: CPT | Mod: 26

## 2021-06-30 PROCEDURE — 93312 ECHO TRANSESOPHAGEAL: CPT

## 2021-06-30 PROCEDURE — 85018 HEMOGLOBIN: CPT

## 2021-06-30 PROCEDURE — C1785: CPT

## 2021-06-30 PROCEDURE — 84132 ASSAY OF SERUM POTASSIUM: CPT

## 2021-06-30 PROCEDURE — 80053 COMPREHEN METABOLIC PANEL: CPT

## 2021-06-30 PROCEDURE — 88300 SURGICAL PATH GROSS: CPT

## 2021-06-30 PROCEDURE — 85014 HEMATOCRIT: CPT

## 2021-06-30 PROCEDURE — 85025 COMPLETE CBC W/AUTO DIFF WBC: CPT

## 2021-06-30 PROCEDURE — 88311 DECALCIFY TISSUE: CPT

## 2021-06-30 PROCEDURE — 93325 DOPPLER ECHO COLOR FLOW MAPG: CPT

## 2021-06-30 PROCEDURE — 93308 TTE F-UP OR LMTD: CPT

## 2021-06-30 PROCEDURE — 36430 TRANSFUSION BLD/BLD COMPNT: CPT

## 2021-06-30 PROCEDURE — 97530 THERAPEUTIC ACTIVITIES: CPT

## 2021-06-30 PROCEDURE — 88305 TISSUE EXAM BY PATHOLOGIST: CPT

## 2021-06-30 PROCEDURE — 93320 DOPPLER ECHO COMPLETE: CPT

## 2021-06-30 PROCEDURE — 93308 TTE F-UP OR LMTD: CPT | Mod: 26

## 2021-06-30 PROCEDURE — 85730 THROMBOPLASTIN TIME PARTIAL: CPT

## 2021-06-30 PROCEDURE — C1769: CPT

## 2021-06-30 PROCEDURE — 84484 ASSAY OF TROPONIN QUANT: CPT

## 2021-06-30 PROCEDURE — 80048 BASIC METABOLIC PNL TOTAL CA: CPT

## 2021-06-30 PROCEDURE — 82962 GLUCOSE BLOOD TEST: CPT

## 2021-06-30 PROCEDURE — 86769 SARS-COV-2 COVID-19 ANTIBODY: CPT

## 2021-06-30 PROCEDURE — C1751: CPT

## 2021-06-30 PROCEDURE — 93005 ELECTROCARDIOGRAM TRACING: CPT

## 2021-06-30 PROCEDURE — 71045 X-RAY EXAM CHEST 1 VIEW: CPT

## 2021-06-30 PROCEDURE — 87640 STAPH A DNA AMP PROBE: CPT

## 2021-06-30 PROCEDURE — 82550 ASSAY OF CK (CPK): CPT

## 2021-06-30 PROCEDURE — 97110 THERAPEUTIC EXERCISES: CPT

## 2021-06-30 PROCEDURE — C1892: CPT

## 2021-06-30 PROCEDURE — 93880 EXTRACRANIAL BILAT STUDY: CPT

## 2021-06-30 PROCEDURE — 83605 ASSAY OF LACTIC ACID: CPT

## 2021-06-30 PROCEDURE — 97116 GAIT TRAINING THERAPY: CPT

## 2021-06-30 PROCEDURE — 86850 RBC ANTIBODY SCREEN: CPT

## 2021-06-30 PROCEDURE — 71046 X-RAY EXAM CHEST 2 VIEWS: CPT

## 2021-06-30 PROCEDURE — 85610 PROTHROMBIN TIME: CPT

## 2021-06-30 PROCEDURE — 84134 ASSAY OF PREALBUMIN: CPT

## 2021-06-30 PROCEDURE — 82553 CREATINE MB FRACTION: CPT

## 2021-06-30 PROCEDURE — 83036 HEMOGLOBIN GLYCOSYLATED A1C: CPT

## 2021-06-30 PROCEDURE — 82803 BLOOD GASES ANY COMBINATION: CPT

## 2021-06-30 PROCEDURE — 83880 ASSAY OF NATRIURETIC PEPTIDE: CPT

## 2021-06-30 PROCEDURE — G0463: CPT

## 2021-06-30 PROCEDURE — 87641 MR-STAPH DNA AMP PROBE: CPT

## 2021-06-30 PROCEDURE — C1898: CPT

## 2021-06-30 PROCEDURE — 82435 ASSAY OF BLOOD CHLORIDE: CPT

## 2021-06-30 RX ORDER — WARFARIN SODIUM 2.5 MG/1
1 TABLET ORAL
Qty: 30 | Refills: 1
Start: 2021-06-30 | End: 2021-08-28

## 2021-06-30 RX ORDER — POLYETHYLENE GLYCOL 3350 17 G/17G
17 POWDER, FOR SOLUTION ORAL
Qty: 0 | Refills: 0 | DISCHARGE
Start: 2021-06-30

## 2021-06-30 RX ORDER — WARFARIN SODIUM 2.5 MG/1
1 TABLET ORAL
Qty: 0 | Refills: 0 | DISCHARGE

## 2021-06-30 RX ORDER — ASPIRIN/CALCIUM CARB/MAGNESIUM 324 MG
1 TABLET ORAL
Qty: 30 | Refills: 1
Start: 2021-06-30 | End: 2021-08-28

## 2021-06-30 RX ORDER — SENNA PLUS 8.6 MG/1
2 TABLET ORAL
Qty: 60 | Refills: 0
Start: 2021-06-30 | End: 2021-07-29

## 2021-06-30 RX ORDER — OXYCODONE AND ACETAMINOPHEN 5; 325 MG/1; MG/1
1 TABLET ORAL
Qty: 20 | Refills: 0
Start: 2021-06-30 | End: 2021-07-04

## 2021-06-30 RX ORDER — AMLODIPINE BESYLATE 2.5 MG/1
1 TABLET ORAL
Qty: 0 | Refills: 0 | DISCHARGE

## 2021-06-30 RX ORDER — IRBESARTAN 75 MG/1
1 TABLET ORAL
Qty: 0 | Refills: 0 | DISCHARGE

## 2021-06-30 RX ORDER — TAMSULOSIN HYDROCHLORIDE 0.4 MG/1
1 CAPSULE ORAL
Qty: 0 | Refills: 0 | DISCHARGE

## 2021-06-30 RX ORDER — ACETAMINOPHEN 500 MG
2 TABLET ORAL
Qty: 0 | Refills: 0 | DISCHARGE
Start: 2021-06-30

## 2021-06-30 RX ORDER — WARFARIN SODIUM 2.5 MG/1
1 TABLET ORAL
Qty: 15 | Refills: 1
Start: 2021-06-30 | End: 2021-08-28

## 2021-06-30 RX ORDER — ATORVASTATIN CALCIUM 80 MG/1
1 TABLET, FILM COATED ORAL
Qty: 0 | Refills: 0 | DISCHARGE

## 2021-06-30 RX ORDER — METOPROLOL TARTRATE 50 MG
1 TABLET ORAL
Qty: 60 | Refills: 1
Start: 2021-06-30 | End: 2021-08-28

## 2021-06-30 RX ORDER — ATORVASTATIN CALCIUM 80 MG/1
1 TABLET, FILM COATED ORAL
Qty: 30 | Refills: 1
Start: 2021-06-30 | End: 2021-08-28

## 2021-06-30 RX ORDER — METOPROLOL TARTRATE 50 MG
1 TABLET ORAL
Qty: 0 | Refills: 0 | DISCHARGE

## 2021-06-30 RX ORDER — ASCORBIC ACID 60 MG
1 TABLET,CHEWABLE ORAL
Qty: 0 | Refills: 0 | DISCHARGE
Start: 2021-06-30

## 2021-06-30 RX ORDER — TAMSULOSIN HYDROCHLORIDE 0.4 MG/1
1 CAPSULE ORAL
Qty: 30 | Refills: 1
Start: 2021-06-30 | End: 2021-08-28

## 2021-06-30 RX ADMIN — PANTOPRAZOLE SODIUM 40 MILLIGRAM(S): 20 TABLET, DELAYED RELEASE ORAL at 12:30

## 2021-06-30 RX ADMIN — Medication 50 MILLIGRAM(S): at 05:31

## 2021-06-30 RX ADMIN — Medication 1 TABLET(S): at 12:29

## 2021-06-30 RX ADMIN — Medication 50 MILLIGRAM(S): at 16:49

## 2021-06-30 RX ADMIN — SODIUM CHLORIDE 3 MILLILITER(S): 9 INJECTION INTRAMUSCULAR; INTRAVENOUS; SUBCUTANEOUS at 12:42

## 2021-06-30 RX ADMIN — Medication 325 MILLIGRAM(S): at 12:30

## 2021-06-30 RX ADMIN — Medication 500 MILLIGRAM(S): at 12:30

## 2021-06-30 RX ADMIN — SODIUM CHLORIDE 3 MILLILITER(S): 9 INJECTION INTRAMUSCULAR; INTRAVENOUS; SUBCUTANEOUS at 05:29

## 2021-06-30 NOTE — PROGRESS NOTE ADULT - PROBLEM SELECTOR PLAN 5
Pantoprazole for GI prophylaxis  SCDs, Lovenox for DVT prophylaxis
Pantoprazole for GI prophylaxis  SCDs, Lovenox for DVT prophylaxis
Pantoprazole for GI prophylaxis  SCDs, Lovenox once cleared by EP for DVT prophylaxis    PLAN of care to be d/w Dr. Fung and CTS team in AM rounds
Pantoprazole for GI prophylaxis  SCDs, Lovenox for DVT prophylaxis
Pantoprazole for GI prophylaxis  SCDs, Lovenox once cleared by EP for DVT prophylaxis

## 2021-06-30 NOTE — PROGRESS NOTE ADULT - PROBLEM SELECTOR PROBLEM 2
DVT, lower extremity

## 2021-06-30 NOTE — DISCHARGE NOTE PROVIDER - NSDCMRMEDTOKEN_GEN_ALL_CORE_FT
acetaminophen 325 mg oral tablet: 2 tab(s) orally every 6 hours, As needed, Moderate Pain (4 - 6)  ascorbic acid 500 mg oral tablet: 1 tab(s) orally once a day  aspirin 325 mg oral delayed release tablet: 1 tab(s) orally once a day  atorvastatin 40 mg oral tablet: 1 tab(s) orally once a day (at bedtime)  metoprolol tartrate 50 mg oral tablet: 1 tab(s) orally 2 times a day  Multiple Vitamins oral tablet: 1 tab(s) orally once a day  Percocet 5 mg-325 mg oral tablet: 1-2 tab(s) orally every 6 hours, As Needed -for moderate pain - for severe pain MDD:4   polyethylene glycol 3350 oral powder for reconstitution: 17 gram(s) orally once a day, As needed, Constipation  senna oral tablet: 2 tab(s) orally once a day (at bedtime)  tamsulosin 0.4 mg oral capsule: 1 cap(s) orally once a day  Vitamin D3 1000 intl units oral capsule: 1 cap(s) orally once a day  warfarin 1 mg oral tablet: 1 tab(s) orally every other day   warfarin 5 mg oral tablet: 1 tab(s) orally once a day

## 2021-06-30 NOTE — PROGRESS NOTE ADULT - PROBLEM SELECTOR PROBLEM 3
Aortic valve stenosis, etiology of cardiac valve disease unspecified

## 2021-06-30 NOTE — DISCHARGE NOTE PROVIDER - NSDCFUSCHEDAPPT_GEN_ALL_CORE_FT
KAYLEIGH ALCANTARA ; 07/06/2021 ; [facility]  KAYLEIGH ALCANTARA ; 07/13/2021 ; [facility] [address]   KAYLEIGH ALCANTARA ; 07/13/2021 ; [facility] [address] [facility]

## 2021-06-30 NOTE — PROGRESS NOTE ADULT - NSICDXPILOT_GEN_ALL_CORE
[place]

## 2021-06-30 NOTE — DISCHARGE NOTE PROVIDER - INSTRUCTIONS
Choose lean meats and poultry without skin and prepare them without added saturated/trans fat. Choose fish at least twice a week, especially those high in omega-3 (salmon or trout). Select fat-free or low-fat dairy products. To lower cholesterol, reduce saturated fat to no more than 5 to 6 percent of total calories. For someone eating 2,000 calories a day, that’s about 13 grams of saturated fat.  Cut back on beverages and foods with added sugars.  Choose and prepare foods with little or no salt. To lower blood pressure, reducing daily intake of sodium to 1,500 mg is desirable because it can lower blood pressure.  If you drink alcohol, drink sparingly and NOT if you are taking narcotics for pain. Follow the American Heart Association recommendations when you eat out, and keep an eye on your portion sizes.

## 2021-06-30 NOTE — DISCHARGE NOTE PROVIDER - NSDCFUADDINST_GEN_ALL_CORE_FT
Please call the Cardiothoracic Surgery office at [phone] if you are experiencing any shortness of breath, chest pain, fevers or chills, drainage from the incisions, persistent nausea, vomiting or if you have any questions about your medications. If the symptoms are severe, call 911 and go to the nearest hospital. You can also call [phone] for an emergency [facility] ambulance, which will take you to the closest [facility].    If you need any assistance for making any appointments for a new consult or referral in any specialty, please call our [facility] Clinical Coordination Center at [phone].

## 2021-06-30 NOTE — PROGRESS NOTE ADULT - PROBLEM SELECTOR PLAN 4
PPM planned postop on 6/28 with Dr. Mccloud
s/p PPM
PPM planned postop  D/W Dr Harrington
PPM planned postop on 6/28 with Dr. Mccloud
s/p PPM  CXR w/ appropriate position
PPM planned postop on 6/28 with Dr. Mccloud

## 2021-06-30 NOTE — DISCHARGE NOTE PROVIDER - NSDCCPTREATMENT_GEN_ALL_CORE_FT
PRINCIPAL PROCEDURE  Procedure: CABG, using 2 venous grafts  Findings and Treatment:       SECONDARY PROCEDURE  Procedure: Aortic valve replacement, tissue  Findings and Treatment:     Procedure: Insertion, cardiac pacemaker, DDD mode  Findings and Treatment:
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
## 2021-06-30 NOTE — PROGRESS NOTE ADULT - PROBLEM SELECTOR PLAN 1
s/p Reop Sternotomy, AVR, C2V on 6/25 with Dr. Kenton Bonilla as tolerated  Continue ASA for acute graft patency  Continue statin for anti inflammatory properties  Encourage IS hourly while awake  Ambulate with PT assist daily
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
## 2021-06-30 NOTE — PROGRESS NOTE ADULT - PROVIDER SPECIALTY LIST ADULT
Cardiology
Electrophysiology
Electrophysiology
Cardiology
Anesthesia
Electrophysiology
CT Surgery

## 2021-06-30 NOTE — PROGRESS NOTE ADULT - PROBLEM SELECTOR PROBLEM 4
Trifascicular block

## 2021-06-30 NOTE — DISCHARGE NOTE PROVIDER - CARE PROVIDERS DIRECT ADDRESSES
[email or web address]

## 2021-06-30 NOTE — DISCHARGE NOTE PROVIDER - CARE PROVIDER_API CALL
Minh Fung)  Surgery; Thoracic and Cardiac Surgery  [address] [place] [address]  Phone: [phone]  Fax: [phone]  Scheduled Appointment: 07/06/2021 12:30 PM    Greyson Hopkins AND [facility] OF MEDICINE  [address] [place] [address]  Phone: [phone]  Fax: [phone]  Follow Up Time: 1-3 days    Taz Harrington)  [facility] [place] [address]  Phone: [phone]  Fax: [phone]  Follow Up Time: 1 month

## 2021-06-30 NOTE — DISCHARGE NOTE PROVIDER - NSDCFUADDAPPT_GEN_ALL_CORE_FT
Please follow up with your primary care physician: Dr Greyson Bell in [place] [phone]  Please follow up with your cardiothoracic surgeon: Dr Minh Fung in [place] [phone]  Please follow up with your cardiologist: Dr Taz Harrington in [place] [phone]    Your Care Navigator Nurse Practitioner will be in touch to see you in your home within a few days from discharge. The Follow Your Heart program can help ensure you understand your medications, discharge instructions and answer any questions you may have at that time. They are also a great source to address concerns during the day and may be reached at [phone].

## 2021-07-01 ENCOUNTER — NON-APPOINTMENT (OUTPATIENT)
Age: 86
End: 2021-07-01

## 2021-07-01 RX ORDER — AMLODIPINE BESYLATE 10 MG/1
10 TABLET ORAL
Qty: 90 | Refills: 1 | Status: DISCONTINUED | COMMUNITY
End: 2021-07-01

## 2021-07-01 RX ORDER — METOPROLOL SUCCINATE 50 MG/1
50 TABLET, EXTENDED RELEASE ORAL
Refills: 0 | Status: DISCONTINUED | COMMUNITY
End: 2021-07-01

## 2021-07-01 RX ORDER — ATORVASTATIN CALCIUM 40 MG/1
40 TABLET, FILM COATED ORAL
Refills: 0 | Status: ACTIVE | COMMUNITY

## 2021-07-01 RX ORDER — IRBESARTAN 300 MG/1
300 TABLET ORAL
Refills: 0 | Status: DISCONTINUED | COMMUNITY
End: 2021-07-01

## 2021-07-02 ENCOUNTER — APPOINTMENT (OUTPATIENT)
Dept: CARE COORDINATION | Facility: HOME HEALTH | Age: 86
End: 2021-07-02
Payer: MEDICARE

## 2021-07-02 VITALS
SYSTOLIC BLOOD PRESSURE: 148 MMHG | OXYGEN SATURATION: 95 % | HEIGHT: 66 IN | WEIGHT: 174 LBS | HEART RATE: 90 BPM | DIASTOLIC BLOOD PRESSURE: 80 MMHG | RESPIRATION RATE: 16 BRPM | BODY MASS INDEX: 27.97 KG/M2

## 2021-07-02 PROCEDURE — 99024 POSTOP FOLLOW-UP VISIT: CPT

## 2021-07-02 RX ORDER — WARFARIN 1 MG/1
1 TABLET ORAL
Refills: 0 | Status: DISCONTINUED | COMMUNITY
End: 2021-07-02

## 2021-07-02 RX ORDER — WARFARIN 5 MG/1
5 TABLET ORAL DAILY
Refills: 0 | Status: DISCONTINUED | COMMUNITY
End: 2021-07-02

## 2021-07-02 NOTE — ASSESSMENT
[FreeTextEntry1] : Pt recovering well at home s/p OHS. Reviewed all medications and dosages with pt understanding. Pt has all medications in home and is taking as prescribed. Pain controlled with current medication regimen. No new symptoms, issues or concerns to report at this time. Pt returned from INR check with Dr. Purdy office and it was 1.3. pt to take Coumadin 6 mg daily until repeat INR draw scheduled for 7/6. \par

## 2021-07-02 NOTE — REVIEW OF SYSTEMS
[Heart Rate Is Slow] : the heart rate was not slow [Heart Rate Is Fast] : the heart rate was not fast [Chest Pain] : no chest pain [Palpitations] : no palpitations [Leg Claudication] : no intermittent leg claudication [Lower Ext Edema] : lower extremity edema [Negative] : Psychiatric [FreeTextEntry7] : last BM today

## 2021-07-02 NOTE — REASON FOR VISIT
[Follow-Up: _____] : a [unfilled] follow-up visit [Spouse] : spouse [FreeTextEntry1] : FOLLOW YOUR HEART- Transitional Care Management Program- [facility]\par \par

## 2021-07-02 NOTE — PHYSICAL EXAM
[Sclera] : the sclera and conjunctiva were normal [Neck Appearance] : the appearance of the neck was normal [Respiration, Rhythm And Depth] : normal respiratory rhythm and effort [Exaggerated Use Of Accessory Muscles For Inspiration] : no accessory muscle use [Auscultation Breath Sounds / Voice Sounds] : lungs were clear to auscultation bilaterally [Apical Impulse] : the apical impulse was normal [Heart Rate And Rhythm] : heart rate was normal and rhythm regular [Heart Sounds] : normal S1 and S2 [Murmurs] : no murmurs [Chest Visual Inspection Thoracic Asymmetry] : no chest asymmetry [1+] : left 1+ [FreeTextEntry2] : B/L LE with trace pedal edema, B/L calves soft, NT [Abdomen Soft] : soft [Abdomen Tenderness] : non-tender [Abnormal Walk] : normal gait [Musculoskeletal - Swelling] : no joint swelling seen [Skin Color & Pigmentation] : normal skin color and pigmentation [Skin Turgor] : normal skin turgor [] : no rash [FreeTextEntry1] : SVG harvest site without erythema, warmth or drainage [Oriented To Time, Place, And Person] : oriented to person, place, and time [Impaired Insight] : insight and judgment were intact [Affect] : the affect was normal
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
## 2021-07-03 ENCOUNTER — APPOINTMENT (OUTPATIENT)
Dept: DISASTER EMERGENCY | Facility: CLINIC | Age: 86
End: 2021-07-03

## 2021-07-06 ENCOUNTER — APPOINTMENT (OUTPATIENT)
Dept: CARDIOTHORACIC SURGERY | Facility: CLINIC | Age: 86
End: 2021-07-06
Payer: MEDICARE

## 2021-07-06 VITALS
HEIGHT: 66 IN | BODY MASS INDEX: 29.25 KG/M2 | RESPIRATION RATE: 16 BRPM | DIASTOLIC BLOOD PRESSURE: 92 MMHG | WEIGHT: 182 LBS | SYSTOLIC BLOOD PRESSURE: 175 MMHG | HEART RATE: 88 BPM | OXYGEN SATURATION: 96 %

## 2021-07-06 PROCEDURE — 99024 POSTOP FOLLOW-UP VISIT: CPT

## 2021-07-08 ENCOUNTER — APPOINTMENT (OUTPATIENT)
Dept: ELECTROPHYSIOLOGY | Facility: CLINIC | Age: 86
End: 2021-07-08
Payer: MEDICARE

## 2021-07-08 VITALS
SYSTOLIC BLOOD PRESSURE: 162 MMHG | TEMPERATURE: 98.7 F | WEIGHT: 184 LBS | OXYGEN SATURATION: 94 % | DIASTOLIC BLOOD PRESSURE: 90 MMHG | HEART RATE: 86 BPM | HEIGHT: 66 IN | BODY MASS INDEX: 29.57 KG/M2

## 2021-07-08 PROCEDURE — 99024 POSTOP FOLLOW-UP VISIT: CPT

## 2021-07-08 PROCEDURE — 93280 PM DEVICE PROGR EVAL DUAL: CPT

## 2021-07-08 RX ORDER — SENNOSIDES 8.6 MG TABLETS 8.6 MG/1
8.6 TABLET ORAL
Refills: 0 | Status: DISCONTINUED | COMMUNITY
End: 2021-07-08

## 2021-07-08 RX ORDER — LORATADINE 10 MG
17 TABLET,DISINTEGRATING ORAL
Refills: 0 | Status: DISCONTINUED | COMMUNITY
End: 2021-07-08

## 2021-07-08 RX ORDER — OXYCODONE HYDROCHLORIDE AND ACETAMINOPHEN 5; 325 MG/1; MG/1
5-325 TABLET ORAL
Refills: 0 | Status: DISCONTINUED | COMMUNITY
End: 2021-07-08

## 2021-07-08 NOTE — DISCUSSION/SUMMARY
[FreeTextEntry1] : \par Left infraclavicular implant site is healing well and WNL\par Incision edges are well approximated. NO evidence of hematoma or infection\par \par Normal dual chamber PPM function.\par Sensing, capture thresholds and impedance are stable and WNL\par NO AT/AF or monitored VT\par Total  31%.  Patient is conducting with intermittent  due to prolonged first degree AVB\par Slightly prolonged AVD to from 300 -> 350ms to promote intrinsic conduction\par \par Post implant restrictions reviewed at length\par EP f/up in 3 months for chronic settings and to assess changes\par \par Seen and discussed with Dr. Helm\par Laura Plunkett PAC\par

## 2021-07-08 NOTE — REVIEW OF SYSTEMS
[Fever] : no fever [Chills] : no chills [Feeling Fatigued] : not feeling fatigued [SOB] : no shortness of breath [Dyspnea on exertion] : not dyspnea during exertion [Chest Discomfort] : no chest discomfort [Leg Claudication] : no intermittent leg claudication [Palpitations] : no palpitations [Orthopnea] : no orthopnea [Syncope] : no syncope [Dizziness] : no dizziness

## 2021-07-08 NOTE — PROCEDURE
[No] : not [NSR] : normal sinus rhythm [See Device Printout] : See device printout [Pacemaker] : pacemaker [[employer]] : [facility] [DDD] : DDD [Normal] : The battery status is normal. [Lead Imp:  ___ohms] : lead impedance was [unfilled] ohms [Sensing Amplitude ___mv] : sensing amplitude was [unfilled] mv [___V @] : [unfilled] V [___ ms] : [unfilled] ms [None] : none [de-identified] : Accguidode [de-identified] : 000152 [de-identified] : 6/28/21 [de-identified] : 60/120 [de-identified] :  31%
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
## 2021-07-08 NOTE — PHYSICAL EXAM
[General Appearance - Well Developed] : well developed [Heart Rate And Rhythm] : heart rate and rhythm were normal [Heart Sounds] : normal S1 and S2 [] : no respiratory distress [Respiration, Rhythm And Depth] : normal respiratory rhythm and effort [Left Infraclavicular] : left infraclavicular area [Clean] : clean [Dry] : dry [Well-Healed] : well-healed [Nail Clubbing] : no clubbing of the fingernails [Erythema] : not erythematous [Warm] : not warm [Tender] : not tender [Indurated] : not indurated

## 2021-07-13 ENCOUNTER — APPOINTMENT (OUTPATIENT)
Dept: CARDIOLOGY | Facility: CLINIC | Age: 86
End: 2021-07-13
Payer: MEDICARE

## 2021-07-13 ENCOUNTER — NON-APPOINTMENT (OUTPATIENT)
Age: 86
End: 2021-07-13

## 2021-07-13 VITALS
SYSTOLIC BLOOD PRESSURE: 200 MMHG | OXYGEN SATURATION: 93 % | RESPIRATION RATE: 16 BRPM | HEART RATE: 82 BPM | WEIGHT: 182 LBS | HEIGHT: 66 IN | DIASTOLIC BLOOD PRESSURE: 100 MMHG | BODY MASS INDEX: 29.25 KG/M2

## 2021-07-13 PROCEDURE — 93000 ELECTROCARDIOGRAM COMPLETE: CPT

## 2021-07-13 PROCEDURE — 99214 OFFICE O/P EST MOD 30 MIN: CPT

## 2021-07-13 RX ORDER — IRBESARTAN 300 MG/1
300 TABLET ORAL DAILY
Qty: 90 | Refills: 1 | Status: ACTIVE | COMMUNITY
Start: 2021-07-13

## 2021-07-13 NOTE — ASSESSMENT
[FreeTextEntry1] : Echocardiogram February 22, 2021 demonstrated left ventricle normal size and function with ejection fraction of 55 to 60%.  Mild concentric LVH.  Mildly dilated left atrium.  Moderate to severe aortic stenosis.  Trace mitral, mild aortic and mild tricuspid regurgitation.  Mild dilatation of the aortic root and ascending aorta.\par \par Cardiac catheterization May 5, 2021 demonstrated 60% distal left main stenosis.  70% mid LAD stenosis.  80% ostial circumflex stenosis with 90% OM1 stenosis and a 50% more distal OM1 stenosis.  40% proximal and distal RCA stenoses.\par \par EKG: Sinus rhythm with first-degree AV block.  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  \par \par 85-year-old man with a past medical history of hypertension, dyslipidemia, AS, DVT who presents to me for f/u.  Patient successfully underwent CABG and open AVR and had pacemaker placement.  He did very well with all of the procedures and there were no complications at all.  His blood pressure is a bit higher now but he is off irbesartan and amlodipine.  Blood pressure is very higher in the office today.  I will restart his irbesartan first and see how his blood pressure response but ultimately will likely need to get back on amlodipine as well.  He does have a little bit of edema in his legs but on the left this is likely secondary to DVT and on the right secondary to vein stripping for his surgery.  He has now been placed on high-dose aspirin I will confirm with the surgeon how long he needs to stay on that, especially given that he is still on Coumadin for his DVT.  No evidence of heart failure at this time.

## 2021-07-13 NOTE — PHYSICAL EXAM
[General Appearance - In No Acute Distress] : no acute distress [Normal Conjunctiva] : the conjunctiva exhibited no abnormalities [Normal Oral Mucosa] : normal oral mucosa [Auscultation Breath Sounds / Voice Sounds] : lungs were clear to auscultation bilaterally [Normal Rate] : normal [Rhythm Regular] : regular [Normal S1] : normal S1 [Normal S2] : normal S2 [S4] : an S4 was heard [III] : a grade 3 [Abdomen Soft] : soft [Abdomen Tenderness] : non-tender [Nail Clubbing] : no clubbing of the fingernails [Cyanosis, Localized] : no localized cyanosis [Oriented To Time, Place, And Person] : oriented to person, place, and time [Affect] : the affect was normal [S3] : no S3 [FreeTextEntry1] : 1+ bilateral lower extremity edema

## 2021-07-13 NOTE — DISCUSSION/SUMMARY
[FreeTextEntry1] : 1. Restart irbesartan 300 mg daily.  Likely will need to restart amlodipine as well.\par 2. Continue other current cardiac meds in doses as noted above for hypertension, CAD, AVR, dyslipidemia.\par 3. Follow-up with EP for his pacemaker.  Ultimately will have the device transferred for monitoring in this office.\par 4. Continue anticoagulation for DVT. INR is monitored through his primary.\par 5. Monitor BP at home, keep a log and bring to f/u.\par 6. Encourage patient to be more active and move around more.\par 7. Follow up here in one month.

## 2021-07-13 NOTE — HISTORY OF PRESENT ILLNESS
[FreeTextEntry1] : Patient presents back to the office today having successfully had his CABG and AVR as well as pacemaker placement.  He tolerated the surgery well and without complication.  In the hospital his irbesartan and amlodipine were stopped.  Blood pressures when checked at home by the visiting nurse and physical therapist have been in the 140s over 80s.  He is little bit sore but overall is feeling fairly well since the surgery.  He did have some issues with numbness and tingling in his right arm soon after the surgery but that has since resolved.  He is taking all of his other medication without a problem including now high-dose aspirin.  Patient denies chest pain, shortness of breath, palpitations, orthopnea, presyncope, syncope.

## 2021-07-20 ENCOUNTER — APPOINTMENT (OUTPATIENT)
Dept: ELECTROPHYSIOLOGY | Facility: CLINIC | Age: 86
End: 2021-07-20

## 2021-07-29 ENCOUNTER — TRANSCRIPTION ENCOUNTER (OUTPATIENT)
Age: 86
End: 2021-07-29

## 2021-08-13 ENCOUNTER — NON-APPOINTMENT (OUTPATIENT)
Age: 86
End: 2021-08-13

## 2021-08-13 ENCOUNTER — APPOINTMENT (OUTPATIENT)
Dept: CARDIOLOGY | Facility: CLINIC | Age: 86
End: 2021-08-13
Payer: MEDICARE

## 2021-08-13 VITALS
BODY MASS INDEX: 28.61 KG/M2 | HEIGHT: 66 IN | OXYGEN SATURATION: 95 % | DIASTOLIC BLOOD PRESSURE: 99 MMHG | HEART RATE: 80 BPM | SYSTOLIC BLOOD PRESSURE: 197 MMHG | WEIGHT: 178 LBS | RESPIRATION RATE: 16 BRPM

## 2021-08-13 DIAGNOSIS — R94.31 ABNORMAL ELECTROCARDIOGRAM [ECG] [EKG]: ICD-10-CM

## 2021-08-13 PROCEDURE — 99214 OFFICE O/P EST MOD 30 MIN: CPT

## 2021-08-13 PROCEDURE — 93000 ELECTROCARDIOGRAM COMPLETE: CPT

## 2021-08-17 NOTE — ADDENDUM
[FreeTextEntry1] : Patient is now planning to undergo hip replacement.  I discussed this with his surgeon who has no objection to proceeding with hip replacement on September 9 as planned.  There is no cardiac contraindication at this time.  Aspirin may be held for 5 days prior to the procedure and started postoperatively at the discretion of orthopedics.  Coumadin should be held for 3 days prior to surgery and started postoperatively at discretion of orthopedics as well with repeat INR 4 days after restarting.  Continue other current cardiac meds in doses as noted above no additional cardiac testing at this time.  Monitor through the procedure until stable post procedurally.

## 2021-08-17 NOTE — PHYSICAL EXAM
[General Appearance - In No Acute Distress] : no acute distress [Normal Conjunctiva] : the conjunctiva exhibited no abnormalities [Normal Oral Mucosa] : normal oral mucosa [Auscultation Breath Sounds / Voice Sounds] : lungs were clear to auscultation bilaterally [Normal Rate] : normal [Rhythm Regular] : regular [Normal S1] : normal S1 [Normal S2] : normal S2 [S4] : an S4 was heard [III] : a grade 3 [Abdomen Soft] : soft [Abdomen Tenderness] : non-tender [Nail Clubbing] : no clubbing of the fingernails [Cyanosis, Localized] : no localized cyanosis [Oriented To Time, Place, And Person] : oriented to person, place, and time [Affect] : the affect was normal [S3] : no S3 [FreeTextEntry1] : Trace bilateral lower extremity edema

## 2021-08-17 NOTE — HISTORY OF PRESENT ILLNESS
[FreeTextEntry1] : Patient presents back today feeling fairly well.  He still has a lot of issues with pain in his hip and is planning follow-up with orthopedics on Monday to discuss the possibility of hip replacement.  This certainly limits his activity to some degree but he reports no other physical limitations with exertion.  He is tolerating his medications without a problem.  He does report blood pressure still in the 150s to 170s over 70s to 80s despite the restarting of irbesartan.  Patient denies chest pain, shortness of breath, palpitations, orthopnea, presyncope, syncope.

## 2021-08-17 NOTE — ASSESSMENT
[FreeTextEntry1] : Echocardiogram February 22, 2021 demonstrated left ventricle normal size and function with ejection fraction of 55 to 60%.  Mild concentric LVH.  Mildly dilated left atrium.  Moderate to severe aortic stenosis.  Trace mitral, mild aortic and mild tricuspid regurgitation.  Mild dilatation of the aortic root and ascending aorta.\par \par Cardiac catheterization May 5, 2021 demonstrated 60% distal left main stenosis.  70% mid LAD stenosis.  80% ostial circumflex stenosis with 90% OM1 stenosis and a 50% more distal OM1 stenosis.  40% proximal and distal RCA stenoses.\par \par EKG: Sinus rhythm with first-degree AV block.  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  \par \par 86-year-old man with a past medical history of hypertension, dyslipidemia, AS, DVT who presents to me for f/u.  Patient continues to do very well since his surgery.  No evidence of heart failure at all.  He needs to follow-up with regards to his hip replacement but there should be no further cardiac contraindication to the surgery now that he has done so well since his cardiac surgery.  Pacemaker appears to be functioning well and he will follow with the EP to see whether he needs to continue to follow with them or can just have a tract through our office.  EKG is unchanged.  Blood pressure remains on the high side and I will restart calcium channel blocker but with nifedipine.

## 2021-08-17 NOTE — DISCUSSION/SUMMARY
[FreeTextEntry1] : 1. Start nifedipine ER 60 mg daily for his elevated blood pressure.\par 2. Continue other current cardiac meds in doses as noted above for hypertension, CAD, AVR, dyslipidemia.  I will discuss with his surgeon at what point we can discontinue or at least lower the dose of aspirin.\par 3. Follow-up with EP for his pacemaker.  Ultimately will have the device transferred for monitoring in this office.\par 4. Continue anticoagulation for DVT. INR is monitored through his primary.\par 5. Monitor BP at home, keep a log and bring to f/u.\par 6. Encourage patient to be more active and move around more.\par 7. Follow-up with orthopedics.\par 8. Follow up here in 2 months.

## 2021-08-25 ENCOUNTER — OUTPATIENT (OUTPATIENT)
Dept: OUTPATIENT SERVICES | Facility: HOSPITAL | Age: 86
LOS: 1 days | Discharge: HOME | End: 2021-08-25
Payer: MEDICARE

## 2021-08-25 VITALS
TEMPERATURE: 98 F | SYSTOLIC BLOOD PRESSURE: 167 MMHG | WEIGHT: 177.47 LBS | HEART RATE: 72 BPM | DIASTOLIC BLOOD PRESSURE: 90 MMHG | HEIGHT: 66 IN | RESPIRATION RATE: 16 BRPM

## 2021-08-25 DIAGNOSIS — Z98.890 OTHER SPECIFIED POSTPROCEDURAL STATES: Chronic | ICD-10-CM

## 2021-08-25 DIAGNOSIS — I82.409 ACUTE EMBOLISM AND THROMBOSIS OF UNSPECIFIED DEEP VEINS OF UNSPECIFIED LOWER EXTREMITY: ICD-10-CM

## 2021-08-25 DIAGNOSIS — Z95.1 PRESENCE OF AORTOCORONARY BYPASS GRAFT: Chronic | ICD-10-CM

## 2021-08-25 DIAGNOSIS — Z95.828 PRESENCE OF OTHER VASCULAR IMPLANTS AND GRAFTS: Chronic | ICD-10-CM

## 2021-08-25 DIAGNOSIS — N40.0 BENIGN PROSTATIC HYPERPLASIA WITHOUT LOWER URINARY TRACT SYMPTOMS: ICD-10-CM

## 2021-08-25 DIAGNOSIS — I80.229 PHLEBITIS AND THROMBOPHLEBITIS OF UNSPECIFIED POPLITEAL VEIN: ICD-10-CM

## 2021-08-25 DIAGNOSIS — M16.12 UNILATERAL PRIMARY OSTEOARTHRITIS, LEFT HIP: ICD-10-CM

## 2021-08-25 DIAGNOSIS — Z86.79 PERSONAL HISTORY OF OTHER DISEASES OF THE CIRCULATORY SYSTEM: ICD-10-CM

## 2021-08-25 DIAGNOSIS — R73.03 PREDIABETES: ICD-10-CM

## 2021-08-25 DIAGNOSIS — Z01.818 ENCOUNTER FOR OTHER PREPROCEDURAL EXAMINATION: ICD-10-CM

## 2021-08-25 LAB
ANION GAP SERPL CALC-SCNC: 7 MMOL/L — SIGNIFICANT CHANGE UP (ref 5–17)
APTT BLD: 49.2 SEC — HIGH (ref 27.5–35.5)
BLD GP AB SCN SERPL QL: SIGNIFICANT CHANGE UP
BUN SERPL-MCNC: 24 MG/DL — HIGH (ref 7–23)
CALCIUM SERPL-MCNC: 9 MG/DL — SIGNIFICANT CHANGE UP (ref 8.5–10.1)
CHLORIDE SERPL-SCNC: 107 MMOL/L — SIGNIFICANT CHANGE UP (ref 96–108)
CO2 SERPL-SCNC: 27 MMOL/L — SIGNIFICANT CHANGE UP (ref 22–31)
CREAT SERPL-MCNC: 1.08 MG/DL — SIGNIFICANT CHANGE UP (ref 0.5–1.3)
GLUCOSE SERPL-MCNC: 124 MG/DL — HIGH (ref 70–99)
HCT VFR BLD CALC: 38.2 % — LOW (ref 39–50)
HGB BLD-MCNC: 12.7 G/DL — LOW (ref 13–17)
INR BLD: 2.79 RATIO — HIGH (ref 0.88–1.16)
MCHC RBC-ENTMCNC: 29.3 PG — SIGNIFICANT CHANGE UP (ref 27–34)
MCHC RBC-ENTMCNC: 33.2 GM/DL — SIGNIFICANT CHANGE UP (ref 32–36)
MCV RBC AUTO: 88.2 FL — SIGNIFICANT CHANGE UP (ref 80–100)
NRBC # BLD: 0 /100 WBCS — SIGNIFICANT CHANGE UP (ref 0–0)
PLATELET # BLD AUTO: 168 K/UL — SIGNIFICANT CHANGE UP (ref 150–400)
POTASSIUM SERPL-MCNC: 3.5 MMOL/L — SIGNIFICANT CHANGE UP (ref 3.5–5.3)
POTASSIUM SERPL-SCNC: 3.5 MMOL/L — SIGNIFICANT CHANGE UP (ref 3.5–5.3)
PROTHROM AB SERPL-ACNC: 30.8 SEC — HIGH (ref 10.6–13.6)
RBC # BLD: 4.33 M/UL — SIGNIFICANT CHANGE UP (ref 4.2–5.8)
RBC # FLD: 14.5 % — SIGNIFICANT CHANGE UP (ref 10.3–14.5)
SODIUM SERPL-SCNC: 141 MMOL/L — SIGNIFICANT CHANGE UP (ref 135–145)
WBC # BLD: 5.84 K/UL — SIGNIFICANT CHANGE UP (ref 3.8–10.5)
WBC # FLD AUTO: 5.84 K/UL — SIGNIFICANT CHANGE UP (ref 3.8–10.5)

## 2021-08-25 PROCEDURE — 93010 ELECTROCARDIOGRAM REPORT: CPT

## 2021-08-25 NOTE — H&P PST ADULT - PROBLEM SELECTOR PLAN 1
left total hip arthroplasty  labs - cbc,pt/ptt,bmp,t&s,nose cx,ekg  M/C required  cardiac clearance  preop 3 day hibiclens instruction reviewed and given .instructed on if  nose cx positive use mupuricin 5 days and checklist given  take routine meds DOS with sips of water. avoid NSAID and OTC supplements. verbalized understanding  information on proper nutrition , increase protein and better food choices provided in packet

## 2021-08-25 NOTE — PHYSICAL THERAPY INITIAL EVALUATION ADULT - MODIFIED CLINICAL TEST OF SENSORY INTEGRATION IN BALANCE TEST
30 second Sit to Stand Test: (reps: 13, adaptation: none) ; One Leg Stand Test (OLST): Right: Unable to perform, Left: 14 secs.

## 2021-08-25 NOTE — H&P PST ADULT - NSICDXPASTMEDICALHX_GEN_ALL_CORE_FT
PAST MEDICAL HISTORY:  1st degree AV block     2nd degree AV block     Aortic valve stenosis, etiology of cardiac valve disease unspecified     Arthritis     BPH (benign prostatic hyperplasia)     CAD (coronary artery disease)     DVT (deep venous thrombosis) IVC filter and coumadin    DVT, lower extremity left leg 2018    Essential hypertension     Mixed hyperlipidemia     Monoclonal gammopathy     Trifascicular block

## 2021-08-25 NOTE — OCCUPATIONAL THERAPY INITIAL EVALUATION ADULT - RANGE OF MOTION EXAMINATION, UPPER EXTREMITY
Grossly. Pt has chronic OA which impacts pts AROM./bilateral UE Active ROM was WFL  (within functional limits)

## 2021-08-25 NOTE — H&P PST ADULT - NSANTHBMIRD_ENT_A_CORE
Problem: Infection  Goal: Will remain free from infection  Outcome: PROGRESSING AS EXPECTED  BID dressing changes ordered for finger. Dressing c/d/i    Problem: Venous Thromboembolism (VTW)/Deep Vein Thrombosis (DVT) Prevention:  Goal: Patient will participate in Venous Thrombosis (VTE)/Deep Vein Thrombosis (DVT)Prevention Measures  Outcome: PROGRESSING AS EXPECTED  Swelling noted over BLE. Educated pt to elevate extremities. SCDs placed. Pt educated to mobilize.        No

## 2021-08-25 NOTE — H&P PST ADULT - ASSESSMENT
86M pmh valvular heart disease (s/p aortic valve replacement), LLE deep vein thrombosis on Coumadin, borderline DM, htn, c/o left hip decreased ROM 2/2 unilateral primary osteoarthritis here for PST for scheduled Left total hip arthroplasty  CAPRINI SCORE    AGE RELATED RISK FACTORS                                                       MOBILITY RELATED FACTORS  [ ] Age 41-60 years                                            (1 Point)                  [ ] Bed rest                                                        (1 Point)  [ ] Age: 61-74 years                                           (2 Points)                [ ] Plaster cast                                                   (2 Points)  [x ] Age= 75 years                                              (3 Points)                 [ ] Bed bound for more than 72 hours                   (2 Points)    DISEASE RELATED RISK FACTORS                                               GENDER SPECIFIC FACTORS  [ x] Edema in the lower extremities                       (1 Point)                  [ ] Pregnancy                                                     (1 Point)  [ ] Varicose veins                                               (1 Point)                  [ ] Post-partum < 6 weeks                                   (1 Point)             [x ] BMI > 25 Kg/m2                                            (1 Point)                  [ ] Hormonal therapy  or oral contraception            (1 Point)                 [ ] Sepsis (in the previous month)                        (1 Point)                  [ ] History of pregnancy complications  [ ] Pneumonia or serious lung disease                                               [ ] Unexplained or recurrent                       (1 Point)           (in the previous month)                               (1 Point)  [ ] Abnormal pulmonary function test                     (1 Point)                 SURGERY RELATED RISK FACTORS  [ ] Acute myocardial infarction                              (1 Point)                 [ ]  Section                                            (1 Point)  [ ] Congestive heart failure (in the previous month)  (1 Point)                 [ ] Minor surgery                                                 (1 Point)   [ ] Inflammatory bowel disease                             (1 Point)                 [ ] Arthroscopic surgery                                        (2 Points)  [ ] Central venous access                                    (2 Points)                [ ] General surgery lasting more than 45 minutes   (2 Points)       [ ] Stroke (in the previous month)                          (5 Points)               [x ] Elective arthroplasty                                        (5 Points)                                                                                                                                               HEMATOLOGY RELATED FACTORS                                                 TRAUMA RELATED RISK FACTORS  [x ] Prior episodes of VTE                                     (3 Points)                 [ ] Fracture of the hip, pelvis, or leg                       (5 Points)  [ ] Positive family history for VTE                         (3 Points)                 [ ] Acute spinal cord injury (in the previous month)  (5 Points)  [ ] Prothrombin 68398 A                                      (3 Points)                 [ ] Paralysis  (less than 1 month)                          (5 Points)  [ ] Factor V Leiden                                             (3 Points)                 [ ] Multiple Trauma within 1 month                         (5 Points)  [ ] Lupus anticoagulants                                     (3 Points)                                                           [ ] Anticardiolipin antibodies                                (3 Points)                                                       [ ] High homocysteine in the blood                      (3 Points)                                             [ ] Other congenital or acquired thrombophilia       (3 Points)                                                [ ] Heparin induced thrombocytopenia                  (3 Points)                                          Total Score [     13     ]

## 2021-08-25 NOTE — PHYSICAL THERAPY INITIAL EVALUATION ADULT - RANGE OF MOTION EXAMINATION, REHAB EVAL
except both shoulders and left elbow limited due to pain and contractures./bilateral upper extremity ROM was WNL (within normal limits)/bilateral lower extremity was ROM was WNL (within normal limits)/deficits as listed below
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
## 2021-08-25 NOTE — OCCUPATIONAL THERAPY INITIAL EVALUATION ADULT - ADDITIONAL COMMENTS
Pt lives with wife (Who can assist post op) in a private house with 1 threshold step with a L rail to enter. Once inside, the pt has 13 steps with a R rail to reach the main floor where the bedroom and bathroom is. The pts bathroom has a tub/shower combination, fixed/retractable shower head, standard toilet and grab bars. The pt reports that a 3/1 commode can fit over the toilet at home. The pt has a tub bench at home. The pt ambulates with a straight cane all the time and owns a rolling walker. The pt daily pain is a 0/10 at rest and a 4/10 with movement. The pt manages the pain with rest and does not take any pain medications. The pt has no recent outpatient PT, no recent falls and has no buckling of the knees. The pt wears glasses all the time, R handed, drives and has no hearing impairments.

## 2021-08-25 NOTE — PHYSICAL THERAPY INITIAL EVALUATION ADULT - ADDITIONAL COMMENTS
Pt lives with his wife (whom can provide assist upon D/C home) in a private home, 1 entry step c L rail up + 1 threshold step, 1 flight of stairs inside c R rail up. Pt has a tub/shower combo with a retractable shower head, standard toilet seat height, & + grab bar. Pt states he is currently independent with all functional mobility including ambulation with straight cane. Pt states he is independent with ADL's as well. Pt reports daily 0/10 pain at rest & states it is worse with any activity 4/10. Pt is right hand dominant, wears eye glasses, drives, & is retired.  Pt denies taking narcotics for pain management. Goal of therapy: manage pain & improve functional mobility.

## 2021-08-25 NOTE — PHYSICAL THERAPY INITIAL EVALUATION ADULT - PERTINENT HX OF CURRENT PROBLEM, REHAB EVAL
Patient attends pre-op testing today following consult c Dr. Veras due to chronic pain to left hip. Elective L EDY is now scheduled in this facility for 9/9/2021.

## 2021-08-25 NOTE — H&P PST ADULT - HISTORY OF PRESENT ILLNESS
86M pmh valvular heart disease (s/p aortic valve replacement), LLE deep vein thrombosis on Coumadin, borderline DM, htn, c/o left hip decreased ROM 2/2 unilateral primary osteoarthritis here for PST for scheduled Left total hip arthroplasty  this patient denies any fever, cough, sob, flu like symptoms or travel outside of the [place] in the past 30 days

## 2021-08-25 NOTE — OCCUPATIONAL THERAPY INITIAL EVALUATION ADULT - PERTINENT HX OF CURRENT PROBLEM, REHAB EVAL
L hip OA which impacts pts ability to perform functional tasks/transfers and mobility. Pt is scheduled for L THR on 9/9/21.

## 2021-08-26 LAB
A1C WITH ESTIMATED AVERAGE GLUCOSE RESULT: 5.8 % — HIGH (ref 4–5.6)
ESTIMATED AVERAGE GLUCOSE: 120 MG/DL — HIGH (ref 68–114)
MRSA PCR RESULT.: SIGNIFICANT CHANGE UP
S AUREUS DNA NOSE QL NAA+PROBE: SIGNIFICANT CHANGE UP

## 2021-09-05 DIAGNOSIS — Z01.818 ENCOUNTER FOR OTHER PREPROCEDURAL EXAMINATION: ICD-10-CM

## 2021-09-06 ENCOUNTER — APPOINTMENT (OUTPATIENT)
Dept: DISASTER EMERGENCY | Facility: CLINIC | Age: 86
End: 2021-09-06

## 2021-09-07 LAB — SARS-COV-2 N GENE NPH QL NAA+PROBE: NOT DETECTED

## 2021-09-08 ENCOUNTER — TRANSCRIPTION ENCOUNTER (OUTPATIENT)
Age: 86
End: 2021-09-08

## 2021-09-09 ENCOUNTER — RESULT REVIEW (OUTPATIENT)
Age: 86
End: 2021-09-09

## 2021-09-09 ENCOUNTER — TRANSCRIPTION ENCOUNTER (OUTPATIENT)
Age: 86
End: 2021-09-09

## 2021-09-09 ENCOUNTER — OUTPATIENT (OUTPATIENT)
Dept: OUTPATIENT SERVICES | Facility: HOSPITAL | Age: 86
LOS: 1 days | Discharge: HOME HEALTH | End: 2021-09-09
Payer: MEDICARE

## 2021-09-09 DIAGNOSIS — Z95.1 PRESENCE OF AORTOCORONARY BYPASS GRAFT: Chronic | ICD-10-CM

## 2021-09-09 DIAGNOSIS — Z98.890 OTHER SPECIFIED POSTPROCEDURAL STATES: Chronic | ICD-10-CM

## 2021-09-09 DIAGNOSIS — Z95.828 PRESENCE OF OTHER VASCULAR IMPLANTS AND GRAFTS: Chronic | ICD-10-CM

## 2021-09-09 PROCEDURE — 88311 DECALCIFY TISSUE: CPT | Mod: 26

## 2021-09-09 PROCEDURE — 88305 TISSUE EXAM BY PATHOLOGIST: CPT | Mod: 26

## 2021-09-10 ENCOUNTER — TRANSCRIPTION ENCOUNTER (OUTPATIENT)
Age: 86
End: 2021-09-10

## 2021-09-22 DIAGNOSIS — Z86.718 PERSONAL HISTORY OF OTHER VENOUS THROMBOSIS AND EMBOLISM: ICD-10-CM

## 2021-09-22 DIAGNOSIS — Z95.4 PRESENCE OF OTHER HEART-VALVE REPLACEMENT: ICD-10-CM

## 2021-09-22 DIAGNOSIS — E78.5 HYPERLIPIDEMIA, UNSPECIFIED: ICD-10-CM

## 2021-09-22 DIAGNOSIS — N40.0 BENIGN PROSTATIC HYPERPLASIA WITHOUT LOWER URINARY TRACT SYMPTOMS: ICD-10-CM

## 2021-09-22 DIAGNOSIS — I10 ESSENTIAL (PRIMARY) HYPERTENSION: ICD-10-CM

## 2021-09-22 DIAGNOSIS — I25.10 ATHEROSCLEROTIC HEART DISEASE OF NATIVE CORONARY ARTERY WITHOUT ANGINA PECTORIS: ICD-10-CM

## 2021-09-22 DIAGNOSIS — R73.03 PREDIABETES: ICD-10-CM

## 2021-09-22 DIAGNOSIS — Z95.0 PRESENCE OF CARDIAC PACEMAKER: ICD-10-CM
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
## 2021-09-22 DIAGNOSIS — I45.3 TRIFASCICULAR BLOCK: ICD-10-CM

## 2021-09-22 DIAGNOSIS — M16.52 UNILATERAL POST-TRAUMATIC OSTEOARTHRITIS, LEFT HIP: ICD-10-CM

## 2021-09-22 DIAGNOSIS — Z79.82 LONG TERM (CURRENT) USE OF ASPIRIN: ICD-10-CM

## 2021-09-22 DIAGNOSIS — Z79.01 LONG TERM (CURRENT) USE OF ANTICOAGULANTS: ICD-10-CM

## 2021-09-22 DIAGNOSIS — Z95.1 PRESENCE OF AORTOCORONARY BYPASS GRAFT: ICD-10-CM

## 2021-09-22 DIAGNOSIS — T84.89XA OTHER SPECIFIED COMPLICATION OF INTERNAL ORTHOPEDIC PROSTHETIC DEVICES, IMPLANTS AND GRAFTS, INITIAL ENCOUNTER: ICD-10-CM

## 2021-09-22 DIAGNOSIS — I44.1 ATRIOVENTRICULAR BLOCK, SECOND DEGREE: ICD-10-CM

## 2021-09-27 ENCOUNTER — INPATIENT (INPATIENT)
Facility: HOSPITAL | Age: 86
LOS: 1 days | Discharge: HOME | DRG: 149 | End: 2021-09-29
Attending: HOSPITALIST | Admitting: HOSPITALIST
Payer: MEDICARE

## 2021-09-27 VITALS
DIASTOLIC BLOOD PRESSURE: 69 MMHG | SYSTOLIC BLOOD PRESSURE: 134 MMHG | TEMPERATURE: 98 F | OXYGEN SATURATION: 96 % | HEART RATE: 95 BPM | HEIGHT: 66 IN | RESPIRATION RATE: 20 BRPM | WEIGHT: 179.9 LBS

## 2021-09-27 DIAGNOSIS — Z98.890 OTHER SPECIFIED POSTPROCEDURAL STATES: Chronic | ICD-10-CM

## 2021-09-27 DIAGNOSIS — R42 DIZZINESS AND GIDDINESS: ICD-10-CM

## 2021-09-27 DIAGNOSIS — Z95.1 PRESENCE OF AORTOCORONARY BYPASS GRAFT: Chronic | ICD-10-CM

## 2021-09-27 DIAGNOSIS — Z95.828 PRESENCE OF OTHER VASCULAR IMPLANTS AND GRAFTS: Chronic | ICD-10-CM

## 2021-09-27 LAB
ALBUMIN SERPL ELPH-MCNC: 3.7 G/DL — SIGNIFICANT CHANGE UP (ref 3.3–5.2)
ALP SERPL-CCNC: 178 U/L — HIGH (ref 40–120)
ALT FLD-CCNC: 22 U/L — SIGNIFICANT CHANGE UP
ANION GAP SERPL CALC-SCNC: 13 MMOL/L — SIGNIFICANT CHANGE UP (ref 5–17)
APTT BLD: 42.1 SEC — HIGH (ref 27.5–35.5)
AST SERPL-CCNC: 19 U/L — SIGNIFICANT CHANGE UP
BASOPHILS # BLD AUTO: 0.02 K/UL — SIGNIFICANT CHANGE UP (ref 0–0.2)
BASOPHILS NFR BLD AUTO: 0.3 % — SIGNIFICANT CHANGE UP (ref 0–2)
BILIRUB SERPL-MCNC: 0.6 MG/DL — SIGNIFICANT CHANGE UP (ref 0.4–2)
BLD GP AB SCN SERPL QL: SIGNIFICANT CHANGE UP
BUN SERPL-MCNC: 14.7 MG/DL — SIGNIFICANT CHANGE UP (ref 8–20)
CALCIUM SERPL-MCNC: 9.4 MG/DL — SIGNIFICANT CHANGE UP (ref 8.6–10.2)
CHLORIDE SERPL-SCNC: 99 MMOL/L — SIGNIFICANT CHANGE UP (ref 98–107)
CO2 SERPL-SCNC: 26 MMOL/L — SIGNIFICANT CHANGE UP (ref 22–29)
CREAT SERPL-MCNC: 0.88 MG/DL — SIGNIFICANT CHANGE UP (ref 0.5–1.3)
EOSINOPHIL # BLD AUTO: 0.05 K/UL — SIGNIFICANT CHANGE UP (ref 0–0.5)
EOSINOPHIL NFR BLD AUTO: 0.9 % — SIGNIFICANT CHANGE UP (ref 0–6)
GLUCOSE SERPL-MCNC: 160 MG/DL — HIGH (ref 70–99)
HCT VFR BLD CALC: 28.9 % — LOW (ref 39–50)
HGB BLD-MCNC: 9.5 G/DL — LOW (ref 13–17)
IMM GRANULOCYTES NFR BLD AUTO: 0.5 % — SIGNIFICANT CHANGE UP (ref 0–1.5)
INR BLD: 2.31 RATIO — HIGH (ref 0.88–1.16)
LIDOCAIN IGE QN: 27 U/L — SIGNIFICANT CHANGE UP (ref 22–51)
LYMPHOCYTES # BLD AUTO: 0.61 K/UL — LOW (ref 1–3.3)
LYMPHOCYTES # BLD AUTO: 10.5 % — LOW (ref 13–44)
MCHC RBC-ENTMCNC: 29.5 PG — SIGNIFICANT CHANGE UP (ref 27–34)
MCHC RBC-ENTMCNC: 32.9 GM/DL — SIGNIFICANT CHANGE UP (ref 32–36)
MCV RBC AUTO: 89.8 FL — SIGNIFICANT CHANGE UP (ref 80–100)
MONOCYTES # BLD AUTO: 0.55 K/UL — SIGNIFICANT CHANGE UP (ref 0–0.9)
MONOCYTES NFR BLD AUTO: 9.5 % — SIGNIFICANT CHANGE UP (ref 2–14)
NEUTROPHILS # BLD AUTO: 4.54 K/UL — SIGNIFICANT CHANGE UP (ref 1.8–7.4)
NEUTROPHILS NFR BLD AUTO: 78.3 % — HIGH (ref 43–77)
PLATELET # BLD AUTO: 293 K/UL — SIGNIFICANT CHANGE UP (ref 150–400)
POTASSIUM SERPL-MCNC: 3.8 MMOL/L — SIGNIFICANT CHANGE UP (ref 3.5–5.3)
POTASSIUM SERPL-SCNC: 3.8 MMOL/L — SIGNIFICANT CHANGE UP (ref 3.5–5.3)
PROT SERPL-MCNC: 7.5 G/DL — SIGNIFICANT CHANGE UP (ref 6.6–8.7)
PROTHROM AB SERPL-ACNC: 25.7 SEC — HIGH (ref 10.6–13.6)
RBC # BLD: 3.22 M/UL — LOW (ref 4.2–5.8)
RBC # FLD: 14.7 % — HIGH (ref 10.3–14.5)
SODIUM SERPL-SCNC: 138 MMOL/L — SIGNIFICANT CHANGE UP (ref 135–145)
TROPONIN T SERPL-MCNC: 0.01 NG/ML — SIGNIFICANT CHANGE UP (ref 0–0.06)
WBC # BLD: 5.8 K/UL — SIGNIFICANT CHANGE UP (ref 3.8–10.5)
WBC # FLD AUTO: 5.8 K/UL — SIGNIFICANT CHANGE UP (ref 3.8–10.5)

## 2021-09-27 PROCEDURE — 70450 CT HEAD/BRAIN W/O DYE: CPT | Mod: 26,MA

## 2021-09-27 PROCEDURE — 71045 X-RAY EXAM CHEST 1 VIEW: CPT | Mod: 26

## 2021-09-27 PROCEDURE — 93010 ELECTROCARDIOGRAM REPORT: CPT

## 2021-09-27 PROCEDURE — 99285 EMERGENCY DEPT VISIT HI MDM: CPT

## 2021-09-27 PROCEDURE — 93971 EXTREMITY STUDY: CPT | Mod: 26,LT

## 2021-09-27 RX ORDER — SODIUM CHLORIDE 9 MG/ML
1000 INJECTION INTRAMUSCULAR; INTRAVENOUS; SUBCUTANEOUS ONCE
Refills: 0 | Status: COMPLETED | OUTPATIENT
Start: 2021-09-27 | End: 2021-09-27

## 2021-09-27 RX ADMIN — SODIUM CHLORIDE 1000 MILLILITER(S): 9 INJECTION INTRAMUSCULAR; INTRAVENOUS; SUBCUTANEOUS at 14:27

## 2021-09-27 NOTE — ED PROVIDER NOTE - OBJECTIVE STATEMENT
Pt is a 85yo Male with PMHx of HTN, DVT (on Coumadin), vavular heart disease (s/p aortic valve replacement), left hip osteoarthritis (s/p left total hip arthroplasty) c/o 2 episodes of dizziness. Pt states episodes had sudden onset and self resolved after 1 minute. Dizziness feels as if the room is tilting. Pt states 1st episode occurred 2pm 2 days ago, 2nd episode occurred yesterday at 5pm. Denies prior episodes of dizziness. Pt denies chest pain, SOB, tinnitus, nausea, vomiting occuring during dizziness episodes. Denies fever, chills, chest pain, SOB, cough, abdominal pain, decreased sensation, decreased motor function, changes in vision, changes in hearing, tinnitus.

## 2021-09-27 NOTE — ED PROVIDER NOTE - SKIN, MLM
Left leg swollen, darkening of skin compared to right leg. Tenderness to palpation of popliteal region and left hip. Skin normal color for race, warm, dry and intact.

## 2021-09-27 NOTE — ED ADULT NURSE NOTE - OBJECTIVE STATEMENT
Pt A&Ox4, NAD. Pt presents to the ED with complaints of a near syncopal episode. Breathing even and unlabored.

## 2021-09-27 NOTE — ED PROVIDER NOTE - ATTENDING CONTRIBUTION TO CARE
The patient seen and examined    Near Syncope vs TIA  DVT    I, Beto Miranda, performed the initial face to face bedside interview with this patient regarding history of present illness, review of symptoms and relevant past medical, social and family history.  I completed an independent physical examination.  I was the initial provider who evaluated this patient. I have signed out the follow up of any pending tests (i.e. labs, radiological studies) to the PA student.  I have communicated the patient’s plan of care and disposition with the PA student.

## 2021-09-27 NOTE — ED PROVIDER NOTE - CLINICAL SUMMARY MEDICAL DECISION MAKING FREE TEXT BOX
The patient presents with dizziness described as ground moving vs near syncope which resolved and also has propagating DVT and will admit for further evaluation

## 2021-09-27 NOTE — ED CLERICAL - NS ED CLERK NOTE PRE-ARRIVAL INFORMATION; ADDITIONAL PRE-ARRIVAL INFORMATION
This patient is enrolled in the comprehensive joint replacement (CJR) program and has active care navigation.  This patient can be followed up by the care navigation team within 24 hours. To arrange close follow-up or to obtain additional clinical information about this patient, please call the contact number above. Please call the orthopedic PA on call ([phone]) for ALL patients who may be admitted.

## 2021-09-28 LAB
A1C WITH ESTIMATED AVERAGE GLUCOSE RESULT: 5.6 % — SIGNIFICANT CHANGE UP (ref 4–5.6)
ESTIMATED AVERAGE GLUCOSE: 114 MG/DL — SIGNIFICANT CHANGE UP (ref 68–114)
INR BLD: 2.03 RATIO — HIGH (ref 0.88–1.16)
PROTHROM AB SERPL-ACNC: 22.8 SEC — HIGH (ref 10.6–13.6)
SARS-COV-2 RNA SPEC QL NAA+PROBE: SIGNIFICANT CHANGE UP

## 2021-09-28 PROCEDURE — 12345: CPT | Mod: NC

## 2021-09-28 PROCEDURE — 99222 1ST HOSP IP/OBS MODERATE 55: CPT

## 2021-09-28 PROCEDURE — 99223 1ST HOSP IP/OBS HIGH 75: CPT

## 2021-09-28 RX ORDER — ATORVASTATIN CALCIUM 80 MG/1
40 TABLET, FILM COATED ORAL AT BEDTIME
Refills: 0 | Status: DISCONTINUED | OUTPATIENT
Start: 2021-09-28 | End: 2021-09-29

## 2021-09-28 RX ORDER — WARFARIN SODIUM 2.5 MG/1
6 TABLET ORAL ONCE
Refills: 0 | Status: COMPLETED | OUTPATIENT
Start: 2021-09-28 | End: 2021-09-28

## 2021-09-28 RX ORDER — NIFEDIPINE 30 MG
1 TABLET, EXTENDED RELEASE 24 HR ORAL
Qty: 0 | Refills: 0 | DISCHARGE

## 2021-09-28 RX ORDER — TAMSULOSIN HYDROCHLORIDE 0.4 MG/1
0.4 CAPSULE ORAL AT BEDTIME
Refills: 0 | Status: DISCONTINUED | OUTPATIENT
Start: 2021-09-28 | End: 2021-09-29

## 2021-09-28 RX ORDER — METOPROLOL TARTRATE 50 MG
50 TABLET ORAL
Refills: 0 | Status: DISCONTINUED | OUTPATIENT
Start: 2021-09-28 | End: 2021-09-29

## 2021-09-28 RX ORDER — ATORVASTATIN CALCIUM 80 MG/1
1 TABLET, FILM COATED ORAL
Qty: 0 | Refills: 0 | DISCHARGE

## 2021-09-28 RX ORDER — CHOLECALCIFEROL (VITAMIN D3) 125 MCG
1 CAPSULE ORAL
Qty: 0 | Refills: 0 | DISCHARGE

## 2021-09-28 RX ORDER — ASCORBIC ACID 60 MG
500 TABLET,CHEWABLE ORAL DAILY
Refills: 0 | Status: DISCONTINUED | OUTPATIENT
Start: 2021-09-28 | End: 2021-09-29

## 2021-09-28 RX ADMIN — Medication 1 TABLET(S): at 13:10

## 2021-09-28 RX ADMIN — Medication 50 MILLIGRAM(S): at 05:32

## 2021-09-28 RX ADMIN — Medication 500 MILLIGRAM(S): at 13:10

## 2021-09-28 RX ADMIN — Medication 50 MILLIGRAM(S): at 16:10

## 2021-09-28 RX ADMIN — WARFARIN SODIUM 6 MILLIGRAM(S): 2.5 TABLET ORAL at 22:12

## 2021-09-28 RX ADMIN — ATORVASTATIN CALCIUM 40 MILLIGRAM(S): 80 TABLET, FILM COATED ORAL at 22:12

## 2021-09-28 RX ADMIN — TAMSULOSIN HYDROCHLORIDE 0.4 MILLIGRAM(S): 0.4 CAPSULE ORAL at 22:11

## 2021-09-28 NOTE — CONSULT NOTE ADULT - ASSESSMENT
ASSESSMENT: Patient is a 86y old m with extension of DVT in     PLAN:   -   -   -   -   - Patient seen/examined or Plan Discussed with Fellow,    - Plan to be discussed with Attending,   ASSESSMENT: Patient is a 86y old m with extension of DVT in LLE. No evidence of ceruleus dolens or thrombophlebitis.    PLAN:   - No indication for acute vascular intervention  - Patient already has IVC filter  - Syncopal work up per medicine    - Plan to be discussed with Attending, Dr. Kowalski
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
## 2021-09-28 NOTE — PROCEDURE NOTE - ADDITIONAL PROCEDURE DETAILS
atrial lead 7840 MRI-Conditional doi 6/28/21  Ventricular lead 7841 MRI-Conditional doi 6/28/21    AP <1%   8%  AT/AF: 0%  1 episode of NSVT 7/8/21 15 beats @ 245msec (pt reports this was day of his knee surgery)    DC [facility]SC[facility] PPM system is MRI conditional   Cardiology Order form is in Chart and scanned into alpha atrial lead 7840 MRI-Conditional doi 6/28/21  Ventricular lead 7841 MRI-Conditional doi 6/28/21    AP <1%   8%  AT/AF: 0%  1 episode of NSVT 7/8/21 15 beats (pt reports this was day of his knee surgery)    DC [facility] PPM system is MRI conditional   Cardiology Order form is in Chart and scanned into alpha  Results reviewed with Dr Guadarrama

## 2021-09-28 NOTE — CONSULT NOTE ADULT - SUBJECTIVE AND OBJECTIVE BOX
VASCULAR SURGERY CONSULT NOTE  --------------------------------------------------------------------------------------------    Patient is a 86y old  Male who presents with a chief complaint of     HPI: ***    Patient denies fevers/chills, denies lightheadedness/dizziness, denies SOB/chest pain, denies nausea/vomiting, denies constipation/diarrhea.      ROS: 10-system review is otherwise negative except HPI above.      PAST MEDICAL & SURGICAL HISTORY:  DVT (deep venous thrombosis)  IVC filter and coumadin    Arthritis    Monoclonal gammopathy    Essential hypertension    Mixed hyperlipidemia    1st degree AV block    2nd degree AV block    BPH (benign prostatic hyperplasia)    Trifascicular block    Aortic valve stenosis, etiology of cardiac valve disease unspecified    DVT, lower extremity  left leg 2018    CAD (coronary artery disease)    Presence of IVC filter    History of hip surgery  arm, leg, and hip from car [employer]  2010    H/O hernia repair  inguinal    History of arthroscopy of shoulder  right shoulder    History of open heart surgery  Retrieval of IVC filter    S/P CABG x 2      FAMILY HISTORY:  Family history of cerebrovascular accident (CVA) (Father)    Family history of malignant neoplasm of colon (Mother)      [] Family history not pertinent as reviewed with the patient and family    SOCIAL HISTORY:  ***    ALLERGIES: No Known Allergies      HOME MEDICATIONS:  ***    CURRENT MEDICATIONS  MEDICATIONS (STANDING):   MEDICATIONS (PRN):  --------------------------------------------------------------------------------------------    Vitals:   T(C): 36.6 (09-28-21 @ 00:14), Max: 36.8 (09-27-21 @ 20:13)  HR: 87 (09-28-21 @ 00:14) (83 - 95)  BP: 163/91 (09-28-21 @ 00:14) (134/69 - 185/99)  RR: 17 (09-28-21 @ 00:14) (17 - 20)  SpO2: 98% (09-28-21 @ 00:14) (94% - 98%)  CAPILLARY BLOOD GLUCOSE        CAPILLARY BLOOD GLUCOSE          Height (cm): 167.6 (09-27 @ 13:35)  Weight (kg): 81.6 (09-27 @ 13:35)  BMI (kg/m2): 29 (09-27 @ 13:35)  BSA (m2): 1.91 (09-27 @ 13:35)    PHYSICAL EXAM: ***  General: NAD, Lying in bed comfortably  Neuro: A+Ox3  HEENT: NC/AT, EOMI  Neck: Soft, supple  Cardio: RRR, nml S1/S2  Resp: Good effort, CTA b/l  Thorax: No chest wall tenderness  Breast: No lesions/masses, no drainage  GI/Abd: Soft, NT/ND, no rebound/guarding, no masses palpated  Vascular: All 4 extremities warm, B/l radial pulses palpable, AVF in UE with palpable thrill, b/l Femoral pulse palpable,  b/l DP/PT palpable, weak signals at b/l DP/PT, palpable pulsatile abdominal mass.  ***  Skin: Intact, no breakdown  Lymphatic/Nodes: No palpable lymphadenopathy  Musculoskeletal: All 4 extremities moving spontaneously, no limitations.  Wound: ***  --------------------------------------------------------------------------------------------    LABS  CBC (09-27 @ 14:58)                              9.5<L>                         5.80    )----------------(  293        78.3<H>% Neutrophils, 10.5<L>% Lymphocytes, ANC: 4.54                                28.9<L>    BMP (09-27 @ 14:58)             138     |  99      |  14.7  		Ca++ --      Ca 9.4                ---------------------------------( 160<H>		Mg --                 3.8     |  26.0    |  0.88  			Ph --        LFTs (09-27 @ 14:58)      TPro 7.5 / Alb 3.7 / TBili 0.6 / DBili -- / AST 19 / ALT 22 / AlkPhos 178<H>    Coags (09-27 @ 20:23)  aPTT 42.1<H> / INR 2.31<H> / PT 25.7<H>          --------------------------------------------------------------------------------------------    MICROBIOLOGY      --------------------------------------------------------------------------------------------    IMAGING       VASCULAR SURGERY CONSULT NOTE  --------------------------------------------------------------------------------------------    HPI: 86M with pmh significant for DVT on warfarin and IVC filter placement in 2009 presents to ED with 2x pre-syncopal episodes. Patient had been off warfarin for ortho procedure, which was on Thursday 9/23, and he restarted it on Friday 9/24 evening. Patient presented for pre-syncopal episodes and received a LE duplex which revealed worsened DVT relative to a scan in 2015. Patient states he had seen someone for the DVT but they recommended compression and annual scans, which he was not interested in so he never returned. Patient denies cool extremity, pain in extremity, color changes, neuropathy.     ROS: 10-system review is otherwise negative except HPI above.      PAST MEDICAL & SURGICAL HISTORY:  DVT (deep venous thrombosis)  IVC filter and coumadin    Arthritis    Monoclonal gammopathy    Essential hypertension    Mixed hyperlipidemia    1st degree AV block    2nd degree AV block    BPH (benign prostatic hyperplasia)    Trifascicular block    Aortic valve stenosis, etiology of cardiac valve disease unspecified    DVT, lower extremity  left leg 2018    CAD (coronary artery disease)    Presence of IVC filter    History of hip surgery  arm, leg, and hip from car [employer]  2010    H/O hernia repair  inguinal    History of arthroscopy of shoulder  right shoulder    History of open heart surgery  Retrieval of IVC filter    S/P CABG x 2      FAMILY HISTORY:  Family history of cerebrovascular accident (CVA) (Father)    Family history of malignant neoplasm of colon (Mother)      [] Family history not pertinent as reviewed with the patient and family    SOCIAL HISTORY:     ALLERGIES: No Known Allergies      HOME MEDICATIONS:      CURRENT MEDICATIONS  MEDICATIONS (STANDING):   MEDICATIONS (PRN):  --------------------------------------------------------------------------------------------    Vitals:   T(C): 36.6 (09-28-21 @ 00:14), Max: 36.8 (09-27-21 @ 20:13)  HR: 87 (09-28-21 @ 00:14) (83 - 95)  BP: 163/91 (09-28-21 @ 00:14) (134/69 - 185/99)  RR: 17 (09-28-21 @ 00:14) (17 - 20)  SpO2: 98% (09-28-21 @ 00:14) (94% - 98%)  CAPILLARY BLOOD GLUCOSE        CAPILLARY BLOOD GLUCOSE          Height (cm): 167.6 (09-27 @ 13:35)  Weight (kg): 81.6 (09-27 @ 13:35)  BMI (kg/m2): 29 (09-27 @ 13:35)  BSA (m2): 1.91 (09-27 @ 13:35)    PHYSICAL EXAM:   General: NAD, Lying in bed comfortably  Resp: Good effort, CTA b/l  Vascular: warm to touch, LLE markedly swollen relative to RLE, dependent edema in thigh. Pulses palpable PT/DP b/l  --------------------------------------------------------------------------------------------    LABS  CBC (09-27 @ 14:58)                              9.5<L>                         5.80    )----------------(  293        78.3<H>% Neutrophils, 10.5<L>% Lymphocytes, ANC: 4.54                                28.9<L>    BMP (09-27 @ 14:58)             138     |  99      |  14.7  		Ca++ --      Ca 9.4                ---------------------------------( 160<H>		Mg --                 3.8     |  26.0    |  0.88  			Ph --        LFTs (09-27 @ 14:58)      TPro 7.5 / Alb 3.7 / TBili 0.6 / DBili -- / AST 19 / ALT 22 / AlkPhos 178<H>    Jatinder (09-27 @ 20:23)  aPTT 42.1<H> / INR 2.31<H> / PT 25.7<H>      --------------------------------------------------------------------------------------------    IMAGING    < from: US Duplex Venous Lower Ext Ltd, Left (09.27.21 @ 18:26) >  DVT is identified within the left common femoral, femoral and popliteal venous segments with incomplete compressibility noted.  The contralateral common femoral vein is patent.  Note is made of arterialized flow within visualized segments of the proximal and distal superficial femoral vein raising the possibility for AV fistulous communication.    No calf vein thrombosis is detected.  Subcutaneous edema is identified within the left calf.    IMPRESSION:  DVT left common femoral, femoral and popliteal venous segments. See above discussion.    < end of copied text >

## 2021-09-28 NOTE — H&P ADULT - NSHPPHYSICALEXAM_GEN_ALL_CORE
T(C): 36.4 (09-28-21 @ 05:32), Max: 37.1 (09-28-21 @ 01:03)  HR: 84 (09-28-21 @ 05:32) (83 - 95)  BP: 149/94 (09-28-21 @ 05:32) (134/69 - 185/99)  RR: 18 (09-28-21 @ 05:32) (16 - 20)  SpO2: 98% (09-28-21 @ 05:32) (94% - 98%)    GENERAL: patient appears well, no acute distress, appropriate, pleasant  EYES: sclera clear, no exudates  ENMT: oropharynx clear without erythema, no exudates, moist mucous membranes  NECK: supple, soft, no thyromegaly noted  LUNGS: good air entry bilaterally, clear to auscultation, symmetric breath sounds, no wheezing or rhonchi appreciated  HEART: soft S1/S2, regular rate and rhythm, no murmurs noted, no lower extremity edema  GASTROINTESTINAL: abdomen is soft, nontender, nondistended, normoactive bowel sounds, no palpable masses  INTEGUMENT: good skin turgor, warm skin, appears well perfused  MUSCULOSKELETAL: no clubbing or cyanosis, no obvious deformity  NEUROLOGIC: awake, alert, oriented x3, good muscle tone in 4 extremities, no obvious sensory deficits  PSYCHIATRIC: mood is good, affect is congruent, linear and logical thought process  HEME/LYMPH: no palpable supraclavicular nodules, no obvious ecchymosis or petechiae

## 2021-09-28 NOTE — CONSULT NOTE ADULT - ASSESSMENT
The patient is a 86y Male with vertigo now resolved.     Vertigo.  Now resolved.   Meclizine as needed.   Cannot have MRI secondary to pacemaker.  No objection to repeating CT head.     DVT  OK for anticoagulation from neurologic standpoint.     Case discussed with ER team (Dr Miranda attending).
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
## 2021-09-28 NOTE — H&P ADULT - NSICDXPASTSURGICALHX_GEN_ALL_CORE_FT
PAST SURGICAL HISTORY:  H/O hernia repair inguinal    History of arthroscopy of shoulder right shoulder    History of hip surgery arm, leg, and hip from car [employer]  2010    History of open heart surgery Retrieval of IVC filter    Presence of IVC filter     S/P CABG x 2      98.2

## 2021-09-28 NOTE — CHART NOTE - NSCHARTNOTEFT_GEN_A_CORE
pt seen and examined at bedside   c/o pre-syncope/dizziness lasting 15sec on 2 occasions.   H&P reviewed from today   Exam   PHYSICAL EXAM:    GENERAL: NAD, well-groomed, well-developed  HEAD:  Atraumatic, Normocephalic  EYES: EOMI, PERRLA, conjunctiva and sclera clear  NECK: Supple, No JVD  NERVOUS SYSTEM:  Alert & Oriented X3, Good concentration; Motor Strength 5/5 B/L upper and lower extremities;   CHEST/LUNG: Clear to percussion bilaterally; No rales, rhonchi. Left chest wall PPM  HEART: Regular rate and rhythm; No murmurs  ABDOMEN: Soft, Nontender, Nondistended; Bowel sounds present  EXTREMITIES:   No clubbing, cyanosis. pedal edema Left leg - chronic DVT       Plan:  orhtostatic BP  ct tele  Neuro   Cardio consult   PPM interrogation  repeat CT head r/o CVA   ct warfarin dosing per INR   DVT - appreciate vascular input

## 2021-09-28 NOTE — CONSULT NOTE ADULT - SUBJECTIVE AND OBJECTIVE BOX
[facility]                                        [facility] [place]                                  Cali Perez, & David                                      [address]                                           [place], [place], [address]                                                [phone]        CC: Vertigo    HISTORY:  The patient is a 86y Male who had a brief episode of spinning type dizziness while sitting on the commode. This resolved within about 20 seconds.   He had a second episode the next day and ultimately presented to the ER.   He has significant cardiac history including pacemaker.    PAST MEDICAL & SURGICAL HISTORY:  DVT (deep venous thrombosis)  IVC filter and coumadin  Arthritis  Monoclonal gammopathy  Essential hypertension  Mixed hyperlipidemia  1st degree AV block  2nd degree AV block  BPH (benign prostatic hyperplasia)  Trifascicular block  Aortic valve stenosis, etiology of cardiac valve disease unspecified  DVT, lower extremity  left leg 2018  CAD (coronary artery disease)  Presence of IVC filter  History of hip surgery  arm, leg, and hip from car [employer]  2010  H/O hernia repair  inguinal  History of arthroscopy of shoulder  right shoulder  History of open heart surgery  Retrieval of IVC filter  S/P CABG x 2    MEDICATIONS  (STANDING):  ascorbic acid 500 milliGRAM(s) Oral daily  atorvastatin 40 milliGRAM(s) Oral at bedtime  metoprolol tartrate 50 milliGRAM(s) Oral two times a day  multivitamin 1 Tablet(s) Oral daily  tamsulosin 0.4 milliGRAM(s) Oral at bedtime    Allergies  No Known Allergies    SOCIAL HISTORY:  Non smoker.     FAMILY HISTORY:  Family history of cerebrovascular accident (CVA) (Father)  Family history of malignant neoplasm of colon (Mother)    ROS:  Constitutional: The patient denies fevers or weight changes.  Neuro: As per HPI.  Eyes: Denies blurry vision.  Ears/nose/throat: Denies Tinnitus.   Cardiac: Denies chest pain. Denies palpitations.  Respiratory: Denies shortness of breath.  GI: Denies abdominal pain, nausea, or vomiting.  : Denies change in urinary pattern.  Integumentary: Denies rash.  Psych: Denies recent mood changes.  Heme: denies easy bleeding/bruising.    Exam:  Vital Signs Last 24 Hrs  T(C): 36.9 (28 Sep 2021 09:01), Max: 37.1 (28 Sep 2021 01:03)  T(F): 98.4 (28 Sep 2021 09:01), Max: 98.7 (28 Sep 2021 01:03)  HR: 87 (28 Sep 2021 09:01) (83 - 95)  BP: 174/80 (28 Sep 2021 09:01) (134/69 - 185/99)  RR: 18 (28 Sep 2021 09:01) (16 - 20)  SpO2: 97% (28 Sep 2021 09:01) (94% - 98%)  General: NAD.   Carotid bruits absent.     Mental status: The patient is awake, alert, and fully oriented. There is no aphasia. Attention span is normal. Patient is aware of current events.     Cranial nerves: There is no papilledema. Pupils react symmetrically to light. There is no visual field deficit to confrontation. Extraocular motion is full with no nystagmus.  Facial sensation is intact. Facial musculature is symmetric. Palate elevates symmetrically. Tongue is midline.    Motor: There is normal bulk and tone.  Strength is 5/5 in the right arm and leg.   Strength is 5/5 in the left arm and leg.    Sensation: Intact to light touch and pin. There is no extinction to double simultaneous stimulation.    Reflexes: 1+ throughout and plantar responses are flexor.    Cerebellar: There is no dysmetria on finger to nose testing.    [facility] SS:   Date: 9/28/21  Time:   1a) Level of consciousness (0-3): 0  1b) Questions (0-2): 0  1c) Commands (0-2): 0  2  ) Gaze (0-2): 0  3  ) Visual field (0-3): 0  4  ) Facial palsy (0-3): 0  Motor  5a) Left arm (0-4): 0  5b) Right arm (0-4): 0  6a) Left leg (0-4): 0  6b) Right leg (0-4): 0  7  ) Ataxia (0-2): 0  Sensory  8  ) Sensory (0-2): 0  Speech  9  ) Language (0-3): 0  10) Dysarthria (0-2): 0  Extinction  11) Extinction/inattention (0-2): 0    Total score: 0    [place]: 0    (0: No symptoms and no disability.  1: No significant disability despite symptoms; able to carry out all usual duties and activities.  2: Slight disability; unable to carry out all previous activity but able to look after own affairs without assistance.  3: Moderate disability; requiring some help but able to walk without assistance.   4: Moderately severe disability; unable to walk without assistance and unable to attend to own bodily needs without assistance.  5: Severe disability; bedridden, incontinent and requiring constant nursing care and attention.   6: Dead. )     LABS:                         9.5    5.80  )-----------( 293      ( 27 Sep 2021 14:58 )             28.9       09-27    138  |  99  |  14.7  ----------------------------<  160<H>  3.8   |  26.0  |  0.88    Ca    9.4      27 Sep 2021 14:58    TPro  7.5  /  Alb  3.7  /  TBili  0.6  /  DBili  x   /  AST  19  /  ALT  22  /  AlkPhos  178<H>  09-27      PT/INR - ( 28 Sep 2021 08:36 )   PT: 22.8 sec;   INR: 2.03 ratio         PTT - ( 27 Sep 2021 20:23 )  PTT:42.1 sec    RADIOLOGY   CT head images reviewed (and concur with report): There is no acute pathology.

## 2021-09-28 NOTE — CONSULT NOTE ADULT - ASSESSMENT
TTE (4/2021):   1. 3D LVEF is 71%.   2. Normal global left ventricular systolic function.   3. Spectral Doppler shows impaired relaxation pattern of left ventricular myocardial filling (Grade I diastolic dysfunction).   4. There is severe concentric left ventricular hypertrophy.   5. Mildly increased RV wall thickness with normal RV size and function (3D RVEF 54%).   6. Normal left atrial size.   7. Normal right atrial size.   8. Lipomatous hypertrophy and hypermobility of the intra-atrial septum.   9. Mild-moderate tricuspid regurgitation.  10. Mild to moderate pulmonic valve regurgitation.  11. Trace mitral valve regurgitation.  12. Moderate thickening of the anterior and posterior mitral valve leaflets.  13. Peak transaortic gradient equals 46.4 mmHg, mean transaortic gradient equals 31.8 mmHg, the calculated aortic valve area equals 0.80 cm² by the continuity equation consistent with severe aortic stenosis.  14. Mild dilatation of the aortic root (3.9 cm) and ascending aorta (4.1 cm).    Cardiac Cath (5/2021):  DIAGNOSTIC IMPRESSIONS: Severe LM, ostial circumflex/OM1 and LAD disease.  Severe AS by TTE.  DIAGNOSTIC RECOMMENDATIONS: CABG, AVR referral.    ECG (6/28/21): sinus rhythm, first degree AV block, bifascicular block     Home Cardiac Meds:  Coumadin  Atorvastatin 20 mg daily  Irbesartan 300 mg daily  Metoprolol succinate 50 mg BID  Amlodipine 10 mg daily        Tyshawn Prado is an 85 year old man with past medical history of Coronary artery disease, Severe Aortic valve stenosis, DVT (on coumadin), Hypertension, Hyperlipidemia and prior AV block who presented electively for CABG & AVR, now s/p repeat sternotomy with CABG x2 (SVG-OM, SVG-LAD) and Valencia #25mm AVR. Patient had intraoperative complete heart block and is now s/p dual chamber pacemaker implant.     Patient appears well today, no signs of decompensated heart failure on physical exam. Telemetry reviewed and consistent with sinus rhythm.    Recommendations:  [] CAD: Now s/p CABG, currently on Aspirin 325 mg daily, now started on Metoprolol Tartrate 25 mg BID. Further post-surgical care per CT surgery team.   [] Severe aortic valve stenosis: Now s/p Valencia #25 AVR. Patient on Aspirin 325 mg daily. Further surveillance imaging as outpatient.   [] Intraoperative complete heart block: Now s/p dual chamber pacemaker. Follow up EP recommendations    Thank you for the consult. No further inpatient cardiac workup recommended at this time. Recommended out of bed as tolerated. The patient should follow-up in cardiology office in 1-2 weeks. Discussed with patient and wife at bedside.     Ramon Guadarrama MD  Cardiology     Assessment:  Tyshawn Prado is an 86 year old man with past medical history of Coronary artery disease, Severe Aortic valve stenosis, DVT (on coumadin), Hypertension, Hyperlipidemia and prior AV block with recent repeat sternotomy with CABG x2 (SVG-OM, SVG-LAD) and Valencia #25mm AVR with intraoperative complete heart block s/p dual chamber pacemaker in June now presents with dizziness.    Agree with Neurology that symptoms are suggestive of vertigo. ECG consistent with sinus rhythm and known first degree AV block and bifascicular block. BP elevated. Troponin negative. Less concern for acute coronary syndrome.     Recommendations:  [] Dizziness: Suggestive of vertigo, follow up Neurology. Given risk factors would repeat carotid US. Will have Cardiac EP interrogate pacemaker to evaluate for any arrhythmia and if MRI needed will need to confirm with cardiac electrophysiologist if pacemaker is MRI compatible if Neuro needs MRI. Continue to monitor on telemetry  [] Hypertension: Would resume home Nifedipine 60 mg daily. Continue home Irbesartan 300 mg daily.   [] CAD s/p CABG and AVR: Continue home Atorvastatin 40 mg daily, Metoprolol tartrate 50 mg q12h. Continue Aspirin 325 mg daily    Thank you for the consult. We will continue to follow along.    Ramon Guadarrama MD  Cardiology

## 2021-09-28 NOTE — CONSULT NOTE ADULT - SUBJECTIVE AND OBJECTIVE BOX
TYSHAWN PRADO  [id number]      HPI:    Tyshawn Prado is an 86 year old man with past medical history of Coronary artery disease, Severe Aortic valve stenosis, DVT (on coumadin), Hypertension, Hyperlipidemia and prior AV block with recent repeat sternotomy with CABG x2 (SVG-OM, SVG-LAD) and Valencia #25mm AVR with intraoperative complete heart block s/p dual chamber pacemaker in June now presents with dizziness.     The patient also had left total hip replacement this month.         ALLERGIES:  No Known Allergies      PAST MEDICAL & SURGICAL HISTORY:  Coronary artery disease (repeat sternotomy with CABG x2 (SVG-OM, SVG-LAD))  Severe Aortic valve stenosis  DVT (on coumadin)  Hypertension  Hyperlipidemia  Valencia #25mm AVR with intraoperative complete heart block s/p dual chamber pacemaker  IVC filter and coumadin  Arthritis  Monoclonal gammopathy      CURRENT MEDICATIONS:  ascorbic acid 500 milliGRAM(s) Oral daily  atorvastatin 40 milliGRAM(s) Oral at bedtime  metoprolol tartrate 50 milliGRAM(s) Oral two times a day  multivitamin 1 Tablet(s) Oral daily  tamsulosin 0.4 milliGRAM(s) Oral at bedtime        FAMILY HISTORY:  Family history of cerebrovascular accident (CVA) (Father)  Family history of malignant neoplasm of colon (Mother)        ROS:  All 10 systems reviewed and positives noted in HPI    OBJECTIVE:    VITAL SIGNS:  Vital Signs Last 24 Hrs  T(C): 36.9 (28 Sep 2021 09:01), Max: 37.1 (28 Sep 2021 01:03)  T(F): 98.4 (28 Sep 2021 09:01), Max: 98.7 (28 Sep 2021 01:03)  HR: 87 (28 Sep 2021 09:01) (83 - 95)  BP: 174/80 (28 Sep 2021 09:01) (134/69 - 185/99)  BP(mean): --  RR: 18 (28 Sep 2021 09:01) (16 - 20)  SpO2: 97% (28 Sep 2021 09:01) (94% - 98%)    PHYSICAL EXAM:  Physical Exam:   General: elderly man, laying in bed, no distress  Neck: supple  Chest: sternal bandage dressing in place  CVS: JVP ~ 7 cm H20, RRR, s1, s2, no murmurs  Pulm: unlabored respirations, CTAB  Abd: non-distended  Ext: no lower extremity edema   Neuro: awake, alert & oriented   Psych: Normal affect    LABS:                        9.5    5.80  )-----------( 293      ( 27 Sep 2021 14:58 )             28.9     09-27    138  |  99  |  14.7  ----------------------------<  160<H>  3.8   |  26.0  |  0.88    Ca    9.4      27 Sep 2021 14:58    TPro  7.5  /  Alb  3.7  /  TBili  0.6  /  DBili  x   /  AST  19  /  ALT  22  /  AlkPhos  178<H>  09-27    CARDIAC MARKERS ( 27 Sep 2021 14:58 )  x     / 0.01 ng/mL / x     / x     / x          PT/INR - ( 28 Sep 2021 08:36 )   PT: 22.8 sec;   INR: 2.03 ratio         PTT - ( 27 Sep 2021 20:23 )  PTT:42.1 sec      ECG:      TTE:     TYSHAWN PRADO  [id number]      HPI:    Tyshawn Prado is an 86 year old man with past medical history of Coronary artery disease, Severe Aortic valve stenosis, DVT (on coumadin), Hypertension, Hyperlipidemia and prior AV block with recent repeat sternotomy with CABG x2 (SVG-OM, SVG-LAD) and Valencia #25mm AVR with intraoperative complete heart block s/p dual chamber pacemaker in June now presents with dizziness.     The patient also had left total hip replacement this month.         ALLERGIES:  No Known Allergies      PAST MEDICAL & SURGICAL HISTORY:  Coronary artery disease (repeat sternotomy with CABG x2 (SVG-OM, SVG-LAD))  Severe Aortic valve stenosis  DVT (on coumadin)  Hypertension  Hyperlipidemia  Valencia #25mm AVR with intraoperative complete heart block s/p dual chamber pacemaker  IVC filter and coumadin  Arthritis  Monoclonal gammopathy      CURRENT MEDICATIONS:  ascorbic acid 500 milliGRAM(s) Oral daily  atorvastatin 40 milliGRAM(s) Oral at bedtime  metoprolol tartrate 50 milliGRAM(s) Oral two times a day  multivitamin 1 Tablet(s) Oral daily  tamsulosin 0.4 milliGRAM(s) Oral at bedtime        FAMILY HISTORY:  Family history of cerebrovascular accident (CVA) (Father)  Family history of malignant neoplasm of colon (Mother)        ROS:  All 10 systems reviewed and positives noted in HPI    OBJECTIVE:    VITAL SIGNS:  Vital Signs Last 24 Hrs  T(C): 36.9 (28 Sep 2021 09:01), Max: 37.1 (28 Sep 2021 01:03)  T(F): 98.4 (28 Sep 2021 09:01), Max: 98.7 (28 Sep 2021 01:03)  HR: 87 (28 Sep 2021 09:01) (83 - 95)  BP: 174/80 (28 Sep 2021 09:01) (134/69 - 185/99)  BP(mean): --  RR: 18 (28 Sep 2021 09:01) (16 - 20)  SpO2: 97% (28 Sep 2021 09:01) (94% - 98%)    PHYSICAL EXAM:  Physical Exam:   General: elderly man, laying in bed, no distress  Neck: supple  Chest: sternal bandage dressing in place  CVS: JVP ~ 7 cm H20, RRR, s1, s2, no murmurs  Pulm: unlabored respirations, CTAB  Abd: non-distended  Ext: no lower extremity edema   Neuro: awake, alert & oriented   Psych: Normal affect    LABS:                        9.5    5.80  )-----------( 293      ( 27 Sep 2021 14:58 )             28.9     09-27    138  |  99  |  14.7  ----------------------------<  160<H>  3.8   |  26.0  |  0.88    Ca    9.4      27 Sep 2021 14:58    TPro  7.5  /  Alb  3.7  /  TBili  0.6  /  DBili  x   /  AST  19  /  ALT  22  /  AlkPhos  178<H>  09-27    CARDIAC MARKERS ( 27 Sep 2021 14:58 )  x     / 0.01 ng/mL / x     / x     / x          PT/INR - ( 28 Sep 2021 08:36 )   PT: 22.8 sec;   INR: 2.03 ratio         PTT - ( 27 Sep 2021 20:23 )  PTT:42.1 sec      TTE (4/2021):   1. 3D LVEF is 71%.   2. Normal global left ventricular systolic function.   3. Spectral Doppler shows impaired relaxation pattern of left ventricular myocardial filling (Grade I diastolic dysfunction).   4. There is severe concentric left ventricular hypertrophy.   5. Mildly increased RV wall thickness with normal RV size and function (3D RVEF 54%).   6. Normal left atrial size.   7. Normal right atrial size.   8. Lipomatous hypertrophy and hypermobility of the intra-atrial septum.   9. Mild-moderate tricuspid regurgitation.  10. Mild to moderate pulmonic valve regurgitation.  11. Trace mitral valve regurgitation.  12. Moderate thickening of the anterior and posterior mitral valve leaflets.  13. Peak transaortic gradient equals 46.4 mmHg, mean transaortic gradient equals 31.8 mmHg, the calculated aortic valve area equals 0.80 cm² by the continuity equation consistent with severe aortic stenosis.  14. Mild dilatation of the aortic root (3.9 cm) and ascending aorta (4.1 cm).    Cardiac Cath (5/2021):  DIAGNOSTIC IMPRESSIONS: Severe LM, ostial circumflex/OM1 and LAD disease.  Severe AS by TTE.  DIAGNOSTIC RECOMMENDATIONS: CABG, AVR referral.    ECG (6/28/21): sinus rhythm, first degree AV block, bifascicular block     Home Cardiac Meds:  Coumadin  Nifedipine 60 mg daily  Atorvastatin 40 mg daily  Irbesartan 300 mg daily  Metoprolol tartrate 50 mg q12h  Aspirin 325 mg daily       TYSHANW PRADO  [id number]      HPI:    Tyshawn Prado is an 86 year old man with past medical history of Coronary artery disease, Severe Aortic valve stenosis, DVT (on coumadin), Hypertension, Hyperlipidemia and prior AV block with recent repeat sternotomy with CABG x2 (SVG-OM, SVG-LAD) and Valencia #25mm AVR with intraoperative complete heart block s/p dual chamber pacemaker in June now presents with dizziness. The patient states for the past 2 days he has had 2 episodes of dizziness and feels like the room is spinning. The episodes last about 15 seconds and happened in a seated position. He has some presyncope but no actual syncope. Denies any recent angina or dyspnea.    The patient also had left total hip replacement this month.     ALLERGIES:  No Known Allergies      PAST MEDICAL & SURGICAL HISTORY:  Coronary artery disease (repeat sternotomy with CABG x2 (SVG-OM, SVG-LAD))  Severe Aortic valve stenosis s/p AVR  DVT (on coumadin)  Hypertension  Hyperlipidemia  Intraoperative complete heart block s/p dual chamber pacemaker  IVC filter and coumadin  Arthritis  Monoclonal gammopathy      CURRENT MEDICATIONS:  ascorbic acid 500 milliGRAM(s) Oral daily  atorvastatin 40 milliGRAM(s) Oral at bedtime  metoprolol tartrate 50 milliGRAM(s) Oral two times a day  multivitamin 1 Tablet(s) Oral daily  tamsulosin 0.4 milliGRAM(s) Oral at bedtime        FAMILY HISTORY:  Family history of cerebrovascular accident (CVA) (Father)  Family history of malignant neoplasm of colon (Mother)        ROS:  All 10 systems reviewed and positives noted in HPI    OBJECTIVE:    VITAL SIGNS:  Vital Signs Last 24 Hrs  T(C): 36.9 (28 Sep 2021 09:01), Max: 37.1 (28 Sep 2021 01:03)  T(F): 98.4 (28 Sep 2021 09:01), Max: 98.7 (28 Sep 2021 01:03)  HR: 87 (28 Sep 2021 09:01) (83 - 95)  BP: 174/80 (28 Sep 2021 09:01) (134/69 - 185/99)  BP(mean): --  RR: 18 (28 Sep 2021 09:01) (16 - 20)  SpO2: 97% (28 Sep 2021 09:01) (94% - 98%)    PHYSICAL EXAM:  General: elderly man, laying in bed, no distress  Neck: supple  Chest: vertical healed sternotomy scar  CVS: JVP ~ 7 cm H20, RRR, s1, s2, no murmurs  Pulm: unlabored respirations, CTAB  Abd: non-distended  Ext: no lower extremity edema   Neuro: awake, alert & oriented   Psych: Normal affect    LABS:                        9.5    5.80  )-----------( 293      ( 27 Sep 2021 14:58 )             28.9     09-27    138  |  99  |  14.7  ----------------------------<  160<H>  3.8   |  26.0  |  0.88    Ca    9.4      27 Sep 2021 14:58    TPro  7.5  /  Alb  3.7  /  TBili  0.6  /  DBili  x   /  AST  19  /  ALT  22  /  AlkPhos  178<H>  09-27    CARDIAC MARKERS ( 27 Sep 2021 14:58 )  x     / 0.01 ng/mL / x     / x     / x          PT/INR - ( 28 Sep 2021 08:36 )   PT: 22.8 sec;   INR: 2.03 ratio         PTT - ( 27 Sep 2021 20:23 )  PTT:42.1 sec      TTE (4/2021):   1. 3D LVEF is 71%.   2. Normal global left ventricular systolic function.   3. Spectral Doppler shows impaired relaxation pattern of left ventricular myocardial filling (Grade I diastolic dysfunction).   4. There is severe concentric left ventricular hypertrophy.   5. Mildly increased RV wall thickness with normal RV size and function (3D RVEF 54%).   6. Normal left atrial size.   7. Normal right atrial size.   8. Lipomatous hypertrophy and hypermobility of the intra-atrial septum.   9. Mild-moderate tricuspid regurgitation.  10. Mild to moderate pulmonic valve regurgitation.  11. Trace mitral valve regurgitation.  12. Moderate thickening of the anterior and posterior mitral valve leaflets.  13. Peak transaortic gradient equals 46.4 mmHg, mean transaortic gradient equals 31.8 mmHg, the calculated aortic valve area equals 0.80 cm² by the continuity equation consistent with severe aortic stenosis.  14. Mild dilatation of the aortic root (3.9 cm) and ascending aorta (4.1 cm).    Cardiac Cath (5/2021):  DIAGNOSTIC IMPRESSIONS: Severe LM, ostial circumflex/OM1 and LAD disease.  Severe AS by TTE.  DIAGNOSTIC RECOMMENDATIONS: CABG, AVR referral.    Carotid US (6/2021):  Moderate to severe plaque in b/l carotids, no significant stenosis    ECG (9/27/21): sinus rhythm, first degree AV block, bifascicular block     CXR (9/27/21):  IMPRESSION:   No radiographic evidence of active chest disease.    CT Head (9/27/21):  No evidence of acute intracranial abnormality.  No evidence of hemorrhage.  Chronic changes as above.    Home Cardiac Meds:  Coumadin  Nifedipine 60 mg daily  Atorvastatin 40 mg daily  Irbesartan 300 mg daily  Metoprolol tartrate 50 mg q12h  Aspirin 325 mg daily

## 2021-09-29 ENCOUNTER — TRANSCRIPTION ENCOUNTER (OUTPATIENT)
Age: 86
End: 2021-09-29

## 2021-09-29 VITALS
RESPIRATION RATE: 18 BRPM | HEART RATE: 94 BPM | OXYGEN SATURATION: 98 % | DIASTOLIC BLOOD PRESSURE: 93 MMHG | SYSTOLIC BLOOD PRESSURE: 169 MMHG | TEMPERATURE: 97 F

## 2021-09-29 LAB
ANION GAP SERPL CALC-SCNC: 12 MMOL/L — SIGNIFICANT CHANGE UP (ref 5–17)
APTT BLD: 38.5 SEC — HIGH (ref 27.5–35.5)
BUN SERPL-MCNC: 14.6 MG/DL — SIGNIFICANT CHANGE UP (ref 8–20)
CALCIUM SERPL-MCNC: 9.1 MG/DL — SIGNIFICANT CHANGE UP (ref 8.6–10.2)
CHLORIDE SERPL-SCNC: 103 MMOL/L — SIGNIFICANT CHANGE UP (ref 98–107)
CHOLEST SERPL-MCNC: 141 MG/DL — SIGNIFICANT CHANGE UP
CO2 SERPL-SCNC: 27 MMOL/L — SIGNIFICANT CHANGE UP (ref 22–29)
COVID-19 SPIKE DOMAIN AB INTERP: POSITIVE
COVID-19 SPIKE DOMAIN ANTIBODY RESULT: 20.4 U/ML — HIGH
CREAT SERPL-MCNC: 1.02 MG/DL — SIGNIFICANT CHANGE UP (ref 0.5–1.3)
GLUCOSE SERPL-MCNC: 115 MG/DL — HIGH (ref 70–99)
HCT VFR BLD CALC: 29.3 % — LOW (ref 39–50)
HDLC SERPL-MCNC: 53 MG/DL — SIGNIFICANT CHANGE UP
HGB BLD-MCNC: 9.4 G/DL — LOW (ref 13–17)
INR BLD: 1.85 RATIO — HIGH (ref 0.88–1.16)
LIPID PNL WITH DIRECT LDL SERPL: 69 MG/DL — SIGNIFICANT CHANGE UP
MCHC RBC-ENTMCNC: 29.4 PG — SIGNIFICANT CHANGE UP (ref 27–34)
MCHC RBC-ENTMCNC: 32.1 GM/DL — SIGNIFICANT CHANGE UP (ref 32–36)
MCV RBC AUTO: 91.6 FL — SIGNIFICANT CHANGE UP (ref 80–100)
NON HDL CHOLESTEROL: 88 MG/DL — SIGNIFICANT CHANGE UP
PLATELET # BLD AUTO: 276 K/UL — SIGNIFICANT CHANGE UP (ref 150–400)
POTASSIUM SERPL-MCNC: 4.4 MMOL/L — SIGNIFICANT CHANGE UP (ref 3.5–5.3)
POTASSIUM SERPL-SCNC: 4.4 MMOL/L — SIGNIFICANT CHANGE UP (ref 3.5–5.3)
PROTHROM AB SERPL-ACNC: 20.8 SEC — HIGH (ref 10.6–13.6)
RBC # BLD: 3.2 M/UL — LOW (ref 4.2–5.8)
RBC # FLD: 14.9 % — HIGH (ref 10.3–14.5)
SARS-COV-2 IGG+IGM SERPL QL IA: 20.4 U/ML — HIGH
SARS-COV-2 IGG+IGM SERPL QL IA: POSITIVE
SODIUM SERPL-SCNC: 141 MMOL/L — SIGNIFICANT CHANGE UP (ref 135–145)
TRIGL SERPL-MCNC: 94 MG/DL — SIGNIFICANT CHANGE UP
WBC # BLD: 4.69 K/UL — SIGNIFICANT CHANGE UP (ref 3.8–10.5)
WBC # FLD AUTO: 4.69 K/UL — SIGNIFICANT CHANGE UP (ref 3.8–10.5)

## 2021-09-29 PROCEDURE — 86850 RBC ANTIBODY SCREEN: CPT

## 2021-09-29 PROCEDURE — 99233 SBSQ HOSP IP/OBS HIGH 50: CPT

## 2021-09-29 PROCEDURE — 86901 BLOOD TYPING SEROLOGIC RH(D): CPT

## 2021-09-29 PROCEDURE — 84484 ASSAY OF TROPONIN QUANT: CPT

## 2021-09-29 PROCEDURE — 86769 SARS-COV-2 COVID-19 ANTIBODY: CPT

## 2021-09-29 PROCEDURE — 71045 X-RAY EXAM CHEST 1 VIEW: CPT

## 2021-09-29 PROCEDURE — 86900 BLOOD TYPING SEROLOGIC ABO: CPT

## 2021-09-29 PROCEDURE — 99285 EMERGENCY DEPT VISIT HI MDM: CPT | Mod: 25

## 2021-09-29 PROCEDURE — 93880 EXTRACRANIAL BILAT STUDY: CPT

## 2021-09-29 PROCEDURE — 70450 CT HEAD/BRAIN W/O DYE: CPT | Mod: 26

## 2021-09-29 PROCEDURE — 85025 COMPLETE CBC W/AUTO DIFF WBC: CPT

## 2021-09-29 PROCEDURE — 36415 COLL VENOUS BLD VENIPUNCTURE: CPT

## 2021-09-29 PROCEDURE — 85610 PROTHROMBIN TIME: CPT

## 2021-09-29 PROCEDURE — 99232 SBSQ HOSP IP/OBS MODERATE 35: CPT

## 2021-09-29 PROCEDURE — 80053 COMPREHEN METABOLIC PANEL: CPT

## 2021-09-29 PROCEDURE — 93005 ELECTROCARDIOGRAM TRACING: CPT

## 2021-09-29 PROCEDURE — 80048 BASIC METABOLIC PNL TOTAL CA: CPT

## 2021-09-29 PROCEDURE — 70450 CT HEAD/BRAIN W/O DYE: CPT

## 2021-09-29 PROCEDURE — U0003: CPT

## 2021-09-29 PROCEDURE — 93880 EXTRACRANIAL BILAT STUDY: CPT | Mod: 26

## 2021-09-29 PROCEDURE — 85027 COMPLETE CBC AUTOMATED: CPT

## 2021-09-29 PROCEDURE — 83690 ASSAY OF LIPASE: CPT

## 2021-09-29 PROCEDURE — U0005: CPT

## 2021-09-29 PROCEDURE — 85730 THROMBOPLASTIN TIME PARTIAL: CPT

## 2021-09-29 PROCEDURE — 99239 HOSP IP/OBS DSCHRG MGMT >30: CPT

## 2021-09-29 PROCEDURE — 80061 LIPID PANEL: CPT

## 2021-09-29 PROCEDURE — 83036 HEMOGLOBIN GLYCOSYLATED A1C: CPT

## 2021-09-29 PROCEDURE — 93971 EXTREMITY STUDY: CPT

## 2021-09-29 RX ORDER — MECLIZINE HCL 12.5 MG
1 TABLET ORAL
Qty: 21 | Refills: 0
Start: 2021-09-29 | End: 2021-10-05

## 2021-09-29 RX ORDER — INFLUENZA VIRUS VACCINE 15; 15; 15; 15 UG/.5ML; UG/.5ML; UG/.5ML; UG/.5ML
0.5 SUSPENSION INTRAMUSCULAR ONCE
Refills: 0 | Status: DISCONTINUED | OUTPATIENT
Start: 2021-09-29 | End: 2021-09-29

## 2021-09-29 RX ORDER — WARFARIN SODIUM 2.5 MG/1
6 TABLET ORAL ONCE
Refills: 0 | Status: DISCONTINUED | OUTPATIENT
Start: 2021-09-29 | End: 2021-09-29

## 2021-09-29 RX ADMIN — Medication 500 MILLIGRAM(S): at 09:16

## 2021-09-29 RX ADMIN — Medication 50 MILLIGRAM(S): at 06:17

## 2021-09-29 RX ADMIN — Medication 50 MILLIGRAM(S): at 14:34

## 2021-09-29 RX ADMIN — Medication 1 TABLET(S): at 09:16

## 2021-09-29 NOTE — DISCHARGE NOTE PROVIDER - NSDCFUSCHEDAPPT_GEN_ALL_CORE_FT
KAYLEIGH ALCANTARA ; 09/30/2021 ; NP Cardio Electro [address] [place] ; 10/07/2021 ; NP CardioElectro [address] ; 11/03/2021 ; [facility] [address] [facility]

## 2021-09-29 NOTE — DISCHARGE NOTE PROVIDER - NSDCCPCAREPLAN_GEN_ALL_CORE_FT
PRINCIPAL DISCHARGE DIAGNOSIS  Diagnosis: Dizziness  Assessment and Plan of Treatment: Seen by Neurology for dizziness, admitted to r/o TIA, CVA. CT head no acute intracranial process. Symptoms likely vertigo vs medication induced vertigo. No acute neurological deficit. Pt can not get MRI due to PPM. Repeat ct head 9/29 shows No acute intracranial hemorrhage or acute territorial infarct. US Duplex Carotid Arteries show no significant hemodynamic stenosis of either internal carotid artery. Please follow up with your primary care physician and neurology within 1 week.      SECONDARY DISCHARGE DIAGNOSES  Diagnosis: DVT, lower extremity  Assessment and Plan of Treatment: For LLE DVT, seen by vascular surgery. This is likely this is chronic but appear as worsen due to possible hold in AC due to hip surgery. INR therapeutic range. Continue eith Coumadin with goal INR 2-3.    Diagnosis: HTN (hypertension)  Assessment and Plan of Treatment: Continue medications as prescribed. Please follow up with your primary care physician within 1 week.    Diagnosis: HLD (hyperlipidemia)  Assessment and Plan of Treatment: Continue medications as prescribed. Please follow up with your primary care physician within 1 week.

## 2021-09-29 NOTE — PROGRESS NOTE ADULT - ASSESSMENT
The patient is a 86y Male with vertigo now resolved.     Vertigo.  Two brief episode lasting 15-20 seconds each.  No recurrence  Meclizine as needed.   Cannot have MRI secondary to pacemaker.  Repeat CT head as above, no acute stroke     DVT  OK for anticoagulation from neurologic standpoint.     Thank you for allowing me to participate in the care of your patient    Todd Leiva MD, PhD   [id number]

## 2021-09-29 NOTE — PROGRESS NOTE ADULT - ASSESSMENT
Assessment:  Tyshawn Prado is an 86 year old man with past medical history of Coronary artery disease, Severe Aortic valve stenosis, DVT (on coumadin), Hypertension, Hyperlipidemia and prior AV block with recent repeat sternotomy with CABG x2 (SVG-OM, SVG-LAD) and Valencia #25mm AVR with intraoperative complete heart block s/p dual chamber pacemaker in June now presents with dizziness.    Agree with Neurology that symptoms are suggestive of vertigo. ECG consistent with sinus rhythm and known first degree AV block and bifascicular block. BP elevated. Troponin negative. Less concern for acute coronary syndrome.     Recommendations:  [] Dizziness: Suggestive of vertigo, follow up Neurology. Carotid US with no significant stenosis. No events on telemetry. Pacemaker interrogated by Cardiac EP and per EP no events, follow EP final recommendations  [] Hypertension: Would resume home Nifedipine 60 mg daily. Continue home Irbesartan 300 mg daily.   [] CAD s/p CABG and AVR: Continue home Atorvastatin 40 mg daily, Metoprolol tartrate 50 mg q12h. Continue Aspirin 325 mg daily    Thank you for the consult. No further inpatient cardiac workup recommended at this time. The patient should follow up in cardiology office in 1-2 weeks.     Ramon Guadarrama MD  Cardiology
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
## 2021-09-29 NOTE — PHYSICAL THERAPY INITIAL EVALUATION ADULT - ADDITIONAL COMMENTS
Pt lives in a house with 2 platform steps and a flight of stairs to bedroom with a HR. Spouse is home and able to assist as needed. Uses a cane or RW.

## 2021-09-29 NOTE — DISCHARGE NOTE PROVIDER - CARE PROVIDER_API CALL
Dr Crocker, Primary medical doctor  Phone: (   )    -  Fax: (   )    -  Established Patient  Follow Up Time: 1 week    Sarita Guadarrama)  Cardiovascular Disease; Internal Medicine  [address] [facility], [address]  [place] [address]  Phone: [phone]  Fax: ()-  Follow Up Time: 1 week

## 2021-09-29 NOTE — PHYSICAL THERAPY INITIAL EVALUATION ADULT - ACTIVE RANGE OF MOTION EXAMINATION, REHAB EVAL
left hip within posterior hip precautions./bilateral upper extremity Active ROM was WFL (within functional limits)/bilateral  lower extremity Active ROM was WFL (within functional limits)

## 2021-09-29 NOTE — PHYSICAL THERAPY INITIAL EVALUATION ADULT - PERTINENT HX OF CURRENT PROBLEM, REHAB EVAL
Pt is an 87 y/o male who presented from home with LLE DVT and feeling dizzy. Pt had left [facility] on 9/9/21.

## 2021-09-29 NOTE — DISCHARGE NOTE NURSING/CASE MANAGEMENT/SOCIAL WORK - PATIENT PORTAL LINK FT
You can access the FollowMyHealth Patient Portal offered by [facility] by registering at the following website: [email or web address]. By joining [facility] FollowMyHealth portal, you will also be able to view your health information using other applications (apps) compatible with our system.

## 2021-09-29 NOTE — DISCHARGE NOTE PROVIDER - PROVIDER TOKENS
FREE:[LAST:[Dr Crocker],FIRST:[Primary medical doctor],PHONE:[(   )    -],FAX:[(   )    -],FOLLOWUP:[1 week],ESTABLISHEDPATIENT:[T]],PROVIDER:[TOKEN:[[id number]],FOLLOWUP:[1 week]]

## 2021-09-29 NOTE — DISCHARGE NOTE PROVIDER - NSDCMRMEDTOKEN_GEN_ALL_CORE_FT
ascorbic acid 500 mg oral tablet: 1 tab(s) orally 2 times a day  atorvastatin 40 mg oral tablet: 1 tab(s) orally once a day  metoprolol tartrate 50 mg oral tablet: 1 tab(s) orally 2 times a day  Multiple Vitamins oral tablet: 1 tab(s) orally once a day  tamsulosin 0.4 mg oral capsule: 1 cap(s) orally once a day  warfarin 1 mg oral tablet: 1 tab(s) orally every other day   warfarin 5 mg oral tablet: 1 tab(s) orally once a day   ascorbic acid 500 mg oral tablet: 1 tab(s) orally 2 times a day  atorvastatin 40 mg oral tablet: 1 tab(s) orally once a day  meclizine 25 mg oral tablet: 1 tab(s) orally 3 times a day, As Needed -for dizziness   metoprolol tartrate 50 mg oral tablet: 1 tab(s) orally 2 times a day  Multiple Vitamins oral tablet: 1 tab(s) orally once a day  tamsulosin 0.4 mg oral capsule: 1 cap(s) orally once a day  warfarin 1 mg oral tablet: 1 tab(s) orally every other day   warfarin 5 mg oral tablet: 1 tab(s) orally once a day

## 2021-09-29 NOTE — CHART NOTE - NSCHARTNOTEFT_GEN_A_CORE
Notified at 00:05 by RN pt had an episode of AIVR at 21:38, per RN pt was asymptomatic at the time of event. Patient is in no signs of acute distress at this time and without complaints. Reviewed telemetry with monitor tech, no events noted, pt with paced rhythm, first degree HB consistent with pt history. Pt with PPM interrogated 9/28. RN to continue to monitor and escalate PRN

## 2021-09-30 ENCOUNTER — NON-APPOINTMENT (OUTPATIENT)
Age: 86
End: 2021-09-30

## 2021-09-30 ENCOUNTER — APPOINTMENT (OUTPATIENT)
Dept: ELECTROPHYSIOLOGY | Facility: CLINIC | Age: 86
End: 2021-09-30
Payer: MEDICARE

## 2021-09-30 PROCEDURE — 93296 REM INTERROG EVL PM/IDS: CPT

## 2021-09-30 PROCEDURE — 93294 REM INTERROG EVL PM/LDLS PM: CPT

## 2021-10-07 ENCOUNTER — APPOINTMENT (OUTPATIENT)
Dept: ELECTROPHYSIOLOGY | Facility: CLINIC | Age: 86
End: 2021-10-07
Payer: MEDICARE

## 2021-10-07 VITALS
SYSTOLIC BLOOD PRESSURE: 160 MMHG | DIASTOLIC BLOOD PRESSURE: 80 MMHG | TEMPERATURE: 97.9 F | HEART RATE: 74 BPM | OXYGEN SATURATION: 97 % | HEIGHT: 66 IN | BODY MASS INDEX: 28.61 KG/M2 | WEIGHT: 178 LBS

## 2021-10-07 PROCEDURE — 99214 OFFICE O/P EST MOD 30 MIN: CPT

## 2021-10-07 PROCEDURE — 93280 PM DEVICE PROGR EVAL DUAL: CPT

## 2021-10-07 RX ORDER — ASPIRIN 325 MG/1
325 TABLET, COATED ORAL
Refills: 0 | Status: DISCONTINUED | COMMUNITY
End: 2021-10-07
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
## 2021-10-07 NOTE — REVIEW OF SYSTEMS
[Dizziness] : dizziness [Headache] : no headache [Feeling Fatigued] : not feeling fatigued [SOB] : no shortness of breath [Syncope] : no syncope [Easy Bleeding] : no tendency for easy bleeding
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
## 2021-11-03 ENCOUNTER — NON-APPOINTMENT (OUTPATIENT)
Age: 86
End: 2021-11-03

## 2021-11-03 ENCOUNTER — APPOINTMENT (OUTPATIENT)
Dept: CARDIOLOGY | Facility: CLINIC | Age: 86
End: 2021-11-03
Payer: MEDICARE

## 2021-11-03 VITALS
RESPIRATION RATE: 16 BRPM | BODY MASS INDEX: 28.93 KG/M2 | HEIGHT: 66 IN | DIASTOLIC BLOOD PRESSURE: 78 MMHG | SYSTOLIC BLOOD PRESSURE: 138 MMHG | HEART RATE: 77 BPM | WEIGHT: 180 LBS

## 2021-11-03 PROCEDURE — 93000 ELECTROCARDIOGRAM COMPLETE: CPT

## 2021-11-03 PROCEDURE — 99214 OFFICE O/P EST MOD 30 MIN: CPT

## 2021-11-03 NOTE — ASSESSMENT
[FreeTextEntry1] : Echocardiogram February 22, 2021 demonstrated left ventricle normal size and function with ejection fraction of 55 to 60%.  Mild concentric LVH.  Mildly dilated left atrium.  Moderate to severe aortic stenosis.  Trace mitral, mild aortic and mild tricuspid regurgitation.  Mild dilatation of the aortic root and ascending aorta.\par \par Cardiac catheterization May 5, 2021 demonstrated 60% distal left main stenosis.  70% mid LAD stenosis.  80% ostial circumflex stenosis with 90% OM1 stenosis and a 50% more distal OM1 stenosis.  40% proximal and distal RCA stenoses.\par \par EKG: Sinus rhythm with first-degree AV block.  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  \par \par 86-year-old man with a past medical history of hypertension, dyslipidemia, AS status post bio AVR, CAD status post CABG, DVT who presents to me for f/u.  Patient continues to do very well since his surgery.  No evidence of heart failure at all.  He was able to have his hip surgery without issue and is doing very well from that as well.  He did have some vertigo but this has since resolved.  He tolerated the addition of nifedipine without a problem and his blood pressures are much improved and now in an excellent range.  EKG is unchanged.

## 2021-11-03 NOTE — HISTORY OF PRESENT ILLNESS
[FreeTextEntry1] : Patient returns today having had his hip surgery and having gone phenomenally well.  He does not even need any additional physical therapy.  He is walking extremely well.  His orthopedist is very happy with the results.  He did have issues with dizziness and was ultimately diagnosed with vertigo after visit to the hospital at the end of September.  This occurred in a few episodes over a couple of days and has not occurred in the last month.  Blood pressures have improved to the 110s to 120s over 60s to 70s after the addition of nifedipine.  He tolerated that without a problem.  Patient denies chest pain, shortness of breath, palpitations, orthopnea, presyncope, syncope.

## 2021-11-03 NOTE — DISCUSSION/SUMMARY
[FreeTextEntry1] : 1. Continue current cardiac meds in doses as noted above for hypertension, CAD, AVR, dyslipidemia.  \par 2. Follow-up with EP for his pacemaker.  Ultimately will have the device transferred for monitoring in this office.\par 3. Continue anticoagulation for DVT. INR is monitored through his primary.\par 4. Monitor BP at home, keep a log and bring to f/u.\par 5. Patient is encouraged to exercise at least 30 minutes a day everyday of the week.\par 6. Follow up here in four months.

## 2021-12-21 ENCOUNTER — NON-APPOINTMENT (OUTPATIENT)
Age: 86
End: 2021-12-21

## 2021-12-21 RX ORDER — WARFARIN SODIUM 6 MG/1
6 TABLET ORAL
Refills: 0 | Status: DISCONTINUED | COMMUNITY
End: 2021-12-21

## 2021-12-27 NOTE — DISCHARGE NOTE PROVIDER - NSDCFUSCHEDAPPT_GEN_ALL_CORE_FT
KAYLEIGH ALCANTARA ; 07/31/2020 ; [facility] [address]  KAYLEIGH ALCANTARA ; 08/12/2020 ; [facility] [address] [facility] KAYLEIGH ALCANTARA ; 07/31/2020 ; [facility] [address]  KAYLEIGH ALCANTARA ; 08/12/2020 ; [facility] [address] [facility] KAYLEIGH ALCANTARA ; 07/31/2020 ; [facility] [address] [place]  KAYLEIGH ALCANTARA ; 08/12/2020 ; [facility] [address] [facility] KAYLEIGH ACLANTARA ; 07/31/2020 ; [facility] [address]  KAYLEIGH ALCANTARA ; 08/12/2020 ; [facility] [address] [facility] KAYLEIGH ALCANTARA ; 07/31/2020 ; [facility] [address] [place]  KAYLEIGH ALCANTARA ; 08/12/2020 ; [facility] [address] [facility] KAYLEIGH ALCANTARA ; 07/31/2020 ; [facility] [address] [place]  KAYLEIGH ALCANTARA ; 08/12/2020 ; [facility] [address] [facility] KAYLEIGH ALCANTARA ; 07/31/2020 ; [facility] [address]  KAYLEIGH ALCANTARA ; 08/12/2020 ; [facility] [address] [facility] [place] Pregnancy And Lactation Text: This medication is Pregnancy Category X and should not be given to women who are pregnant or may become pregnant. This medication should not be used if you are breast feeding.

## 2022-01-06 ENCOUNTER — APPOINTMENT (OUTPATIENT)
Dept: ELECTROPHYSIOLOGY | Facility: CLINIC | Age: 87
End: 2022-01-06
Payer: MEDICARE

## 2022-01-06 ENCOUNTER — NON-APPOINTMENT (OUTPATIENT)
Age: 87
End: 2022-01-06

## 2022-01-06 PROCEDURE — 93296 REM INTERROG EVL PM/IDS: CPT

## 2022-01-06 PROCEDURE — 93294 REM INTERROG EVL PM/LDLS PM: CPT

## 2022-03-28 ENCOUNTER — NON-APPOINTMENT (OUTPATIENT)
Age: 87
End: 2022-03-28

## 2022-03-28 ENCOUNTER — APPOINTMENT (OUTPATIENT)
Dept: CARDIOLOGY | Facility: CLINIC | Age: 87
End: 2022-03-28
Payer: MEDICARE

## 2022-03-28 VITALS
WEIGHT: 183 LBS | SYSTOLIC BLOOD PRESSURE: 155 MMHG | HEIGHT: 66 IN | DIASTOLIC BLOOD PRESSURE: 85 MMHG | RESPIRATION RATE: 16 BRPM | BODY MASS INDEX: 29.41 KG/M2 | HEART RATE: 83 BPM

## 2022-03-28 VITALS — DIASTOLIC BLOOD PRESSURE: 84 MMHG | SYSTOLIC BLOOD PRESSURE: 148 MMHG

## 2022-03-28 DIAGNOSIS — I82.492 ACUTE EMBOLISM AND THROMBOSIS OF OTHER SPECIFIED DEEP VEIN OF LEFT LOWER EXTREMITY: ICD-10-CM

## 2022-03-28 PROCEDURE — 99214 OFFICE O/P EST MOD 30 MIN: CPT

## 2022-03-28 PROCEDURE — 93000 ELECTROCARDIOGRAM COMPLETE: CPT

## 2022-03-28 NOTE — HISTORY OF PRESENT ILLNESS
[FreeTextEntry1] : Patient presents back to the office today feeling well and offering no complaints.  He continues to be able to be more active these days and able to do so with no real physical limitations.  The improvements in his hip have certainly helped as well.  He did not bring a list today but says his blood pressures have been in the 120s over 70s to 80s.  He is planning a trip to [place] in the near future.  He tolerated the change from Coumadin to Eliquis without a problem and has had no bleeding.  He is tolerating his other medication without a problem.  He had a little dizziness when he started nifedipine but that has improved.  Patient denies chest pain, shortness of breath, palpitations, orthopnea, presyncope, syncope.

## 2022-03-28 NOTE — ASSESSMENT
[FreeTextEntry1] : Echocardiogram February 22, 2021 demonstrated left ventricle normal size and function with ejection fraction of 55 to 60%.  Mild concentric LVH.  Mildly dilated left atrium.  Moderate to severe aortic stenosis.  Trace mitral, mild aortic and mild tricuspid regurgitation.  Mild dilatation of the aortic root and ascending aorta.\par \par Cardiac catheterization May 5, 2021 demonstrated 60% distal left main stenosis.  70% mid LAD stenosis.  80% ostial circumflex stenosis with 90% OM1 stenosis and a 50% more distal OM1 stenosis.  40% proximal and distal RCA stenoses.\par \par EKG: Sinus rhythm with first-degree AV block.  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  \par \par 86-year-old man with a past medical history of hypertension, dyslipidemia, AS status post bio AVR, CAD status post CABG, DVT who presents to me for f/u.  Patient continues to do very well since his surgery.  No evidence of heart failure at all.  Blood pressure appears to be controlled though have encouraged him to bring a list when he comes in follow-up.  He will send me one in a month to ensure that is well controlled.  I will make no changes to his medication at this time.  No evidence of heart failure at all.  I will plan echocardiogram and follow-up to reevaluate his valve.  EKG is unchanged.

## 2022-03-28 NOTE — DISCUSSION/SUMMARY
[FreeTextEntry1] : 1. Continue current cardiac meds in doses as noted above for hypertension, CAD, AVR, dyslipidemia.  \par 2. I will discuss with the EP having his device transferred to our office.\par 3. Continue anticoagulation for DVT.\par 4. Monitor BP at home, keep a log and bring to f/u.\par 5. Patient is encouraged to exercise at least 30 minutes a day everyday of the week.\par 6. He will have blood work done.\par 7. Echocardiogram prior to follow-up to reevaluate his AVR.\par 8. Follow up here in four months.

## 2022-04-07 ENCOUNTER — APPOINTMENT (OUTPATIENT)
Dept: ELECTROPHYSIOLOGY | Facility: CLINIC | Age: 87
End: 2022-04-07

## 2022-05-05 ENCOUNTER — RX RENEWAL (OUTPATIENT)
Age: 87
End: 2022-05-05

## 2022-05-08 NOTE — PHYSICAL THERAPY INITIAL EVALUATION ADULT - PHYSICAL ASSIST/NONPHYSICAL ASSIST: GAIT, REHAB EVAL
verbal cues/nonverbal cues (demo/gestures)/1 person + 1 person to manage equipment Speaking Coherently

## 2022-05-10 ENCOUNTER — RX RENEWAL (OUTPATIENT)
Age: 87
End: 2022-05-10

## 2022-05-11 ENCOUNTER — NON-APPOINTMENT (OUTPATIENT)
Age: 87
End: 2022-05-11

## 2022-06-02 ENCOUNTER — APPOINTMENT (OUTPATIENT)
Dept: ELECTROPHYSIOLOGY | Facility: CLINIC | Age: 87
End: 2022-06-02
Payer: MEDICARE

## 2022-06-02 ENCOUNTER — NON-APPOINTMENT (OUTPATIENT)
Age: 87
End: 2022-06-02

## 2022-06-02 VITALS
HEIGHT: 66 IN | WEIGHT: 179 LBS | BODY MASS INDEX: 28.77 KG/M2 | TEMPERATURE: 98.1 F | OXYGEN SATURATION: 97 % | DIASTOLIC BLOOD PRESSURE: 60 MMHG | SYSTOLIC BLOOD PRESSURE: 120 MMHG | HEART RATE: 69 BPM

## 2022-06-02 DIAGNOSIS — I44.1 ATRIOVENTRICULAR BLOCK, SECOND DEGREE: ICD-10-CM

## 2022-06-02 PROCEDURE — 93280 PM DEVICE PROGR EVAL DUAL: CPT

## 2022-06-02 PROCEDURE — 99213 OFFICE O/P EST LOW 20 MIN: CPT

## 2022-06-02 PROCEDURE — 93000 ELECTROCARDIOGRAM COMPLETE: CPT | Mod: 59

## 2022-06-02 RX ORDER — BROMPHENIRAMINE MALEATE, PSEUDOEPHEDRINE HYDROCHLORIDE, 2; 30; 10 MG/5ML; MG/5ML; MG/5ML
30-2-10 SYRUP ORAL
Qty: 120 | Refills: 0 | Status: DISCONTINUED | COMMUNITY
Start: 2021-11-22 | End: 2022-06-02
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
## 2022-06-02 NOTE — ADDENDUM
[FreeTextEntry1] : I was present for the above evaluation and agree with the history, physical exam, assessment and plan as above. pt feeling well, with nml ppm function. will continue device monitoring and clinical followup as needed.

## 2022-06-02 NOTE — PROCEDURE
[No] : not [NSR] : normal sinus rhythm [See Device Printout] : See device printout [Pacemaker] : pacemaker [Normal] : The battery status is normal. [Lead Imp:  ___ohms] : lead impedance was [unfilled] ohms [Sensing Amplitude ___mv] : sensing amplitude was [unfilled] mv [___V @] : [unfilled] V [___ ms] : [unfilled] ms [None] : none [de-identified] : Accguidode [de-identified] : [id number] [de-identified] : 6/28/21 [de-identified] : Brief run of PAT < 10 seconds duration\par  2%

## 2022-06-02 NOTE — PHYSICAL EXAM
[General Appearance - Well Developed] : well developed [General Appearance - Well Nourished] : well nourished [] : no respiratory distress [Left Infraclavicular] : left infraclavicular area [Clean] : clean [Dry] : dry [Well-Healed] : well-healed [Erythema] : not erythematous [Warm] : not warm [Tender] : not tender [Indurated] : not indurated
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
## 2022-07-07 ENCOUNTER — APPOINTMENT (OUTPATIENT)
Dept: ELECTROPHYSIOLOGY | Facility: CLINIC | Age: 87
End: 2022-07-07

## 2022-07-14 ENCOUNTER — APPOINTMENT (OUTPATIENT)
Dept: CARDIOLOGY | Facility: CLINIC | Age: 87
End: 2022-07-14

## 2022-07-14 PROCEDURE — 93306 TTE W/DOPPLER COMPLETE: CPT

## 2022-07-19 ENCOUNTER — APPOINTMENT (OUTPATIENT)
Dept: CARDIOLOGY | Facility: CLINIC | Age: 87
End: 2022-07-19

## 2022-07-19 ENCOUNTER — NON-APPOINTMENT (OUTPATIENT)
Age: 87
End: 2022-07-19

## 2022-07-19 VITALS
SYSTOLIC BLOOD PRESSURE: 146 MMHG | RESPIRATION RATE: 16 BRPM | BODY MASS INDEX: 29.73 KG/M2 | DIASTOLIC BLOOD PRESSURE: 77 MMHG | OXYGEN SATURATION: 95 % | HEIGHT: 66 IN | WEIGHT: 185 LBS | HEART RATE: 72 BPM

## 2022-07-19 PROCEDURE — 93000 ELECTROCARDIOGRAM COMPLETE: CPT

## 2022-07-19 PROCEDURE — 99214 OFFICE O/P EST MOD 30 MIN: CPT

## 2022-07-19 NOTE — ASSESSMENT
[FreeTextEntry1] : Echocardiogram February 22, 2021 demonstrated left ventricle normal size and function with ejection fraction of 55 to 60%.  Mild concentric LVH.  Mildly dilated left atrium.  Moderate to severe aortic stenosis.  Trace mitral, mild aortic and mild tricuspid regurgitation.  Mild dilatation of the aortic root and ascending aorta.\par \par Cardiac catheterization May 5, 2021 demonstrated 60% distal left main stenosis.  70% mid LAD stenosis.  80% ostial circumflex stenosis with 90% OM1 stenosis and a 50% more distal OM1 stenosis.  40% proximal and distal RCA stenoses.\par \par Echocardiogram July 14, 2022 demonstrated left ventricle normal size and function with ejection fraction of 55 to 60%.  Mild concentric LVH.  Normal ventricular size with normal function.  Moderately dilated left atrium.  Mildly dilated right atrium.  Normal functioning bioprosthetic aortic valve.  Mild mitral and mild to moderate tricuspid regurgitation.  Mild dilatation of the aortic root.\par \par EKG: Sinus rhythm with first-degree AV block.  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  \par \par 86-year-old man with a past medical history of hypertension, dyslipidemia, AS status post bio AVR, CAD status post CABG, DVT who presents to me for f/u.  Patient continues to do well.  He is having little bit of dizziness associated with some low blood pressure at times.  He inquired about changing his nifedipine to 30 mg twice a day instead of 60 mg altogether and this certainly seems reasonable.  If his blood pressure still get low when he gets symptomatic we may decrease the dose to 30 mg once a day.  I have encouraged him to remain well-hydrated.  There is no evidence of heart failure.  EKG appears unchanged.  Recent interrogation of his pacemaker shows normal function.  Echocardiogram shows good function of his bioprosthetic valve and a normal EF.

## 2022-07-19 NOTE — DISCUSSION/SUMMARY
[FreeTextEntry1] : 1. Change nifedipine to 30 mg twice a day.  If blood pressures are still going low or he has more dizziness he will let me know and we will decrease to 30 mg once a day.\par 2. Continue other current cardiac meds in doses as noted above for hypertension, CAD, AVR, dyslipidemia.  \par 3. Device checks as scheduled.\par 4. Continue anticoagulation for DVT.\par 5. Monitor BP at home, keep a log and bring to f/u.\par 6. Patient is encouraged to exercise at least 30 minutes a day everyday of the week.\par 7. No additional cardiac testing at this time.\par 8. Follow up here in three months. [EKG obtained to assist in diagnosis and management of assessed problem(s)] : EKG obtained to assist in diagnosis and management of assessed problem(s)

## 2022-07-19 NOTE — HISTORY OF PRESENT ILLNESS
[FreeTextEntry1] : Patient resents back to the office today feeling well generally.  He patient presents back to the office today feeling generally well although admitting that he does have some dizziness at times.  Sometimes his blood pressure will go low even below 100 systolic and when that happens he does get quite dizzy.  This does not happen very frequently but when it happens it is noticeable.  He notices it mostly since being on the higher dose of nifedipine.  No other symptoms at all.  He is otherwise able to be active without a problem.  Patient denies chest pain, shortness of breath, palpitations, orthopnea, presyncope, syncope.

## 2022-10-04 ENCOUNTER — APPOINTMENT (OUTPATIENT)
Dept: CARDIOLOGY | Facility: CLINIC | Age: 87
End: 2022-10-04

## 2022-10-04 PROCEDURE — 93296 REM INTERROG EVL PM/IDS: CPT

## 2022-10-04 PROCEDURE — 93294 REM INTERROG EVL PM/LDLS PM: CPT

## 2022-11-07 ENCOUNTER — APPOINTMENT (OUTPATIENT)
Dept: CARDIOLOGY | Facility: CLINIC | Age: 87
End: 2022-11-07

## 2022-11-07 ENCOUNTER — NON-APPOINTMENT (OUTPATIENT)
Age: 87
End: 2022-11-07

## 2022-11-07 VITALS
RESPIRATION RATE: 16 BRPM | HEIGHT: 66 IN | OXYGEN SATURATION: 95 % | WEIGHT: 187 LBS | BODY MASS INDEX: 30.05 KG/M2 | HEART RATE: 74 BPM | SYSTOLIC BLOOD PRESSURE: 155 MMHG | DIASTOLIC BLOOD PRESSURE: 87 MMHG

## 2022-11-07 DIAGNOSIS — Z95.0 PRESENCE OF CARDIAC PACEMAKER: ICD-10-CM

## 2022-11-07 PROCEDURE — 93288 INTERROG EVL PM/LDLS PM IP: CPT

## 2022-11-07 PROCEDURE — 93000 ELECTROCARDIOGRAM COMPLETE: CPT | Mod: 59

## 2022-11-07 PROCEDURE — 99214 OFFICE O/P EST MOD 30 MIN: CPT | Mod: 25

## 2022-11-07 NOTE — ASSESSMENT
[FreeTextEntry1] : Echocardiogram February 22, 2021 demonstrated left ventricle normal size and function with ejection fraction of 55 to 60%.  Mild concentric LVH.  Mildly dilated left atrium.  Moderate to severe aortic stenosis.  Trace mitral, mild aortic and mild tricuspid regurgitation.  Mild dilatation of the aortic root and ascending aorta.\par \par Cardiac catheterization May 5, 2021 demonstrated 60% distal left main stenosis.  70% mid LAD stenosis.  80% ostial circumflex stenosis with 90% OM1 stenosis and a 50% more distal OM1 stenosis.  40% proximal and distal RCA stenoses.\par \par Echocardiogram July 14, 2022 demonstrated left ventricle normal size and function with ejection fraction of 55 to 60%.  Mild concentric LVH.  Normal ventricular size with normal function.  Moderately dilated left atrium.  Mildly dilated right atrium.  Normal functioning bioprosthetic aortic valve.  Mild mitral and mild to moderate tricuspid regurgitation.  Mild dilatation of the aortic root.\par \par EKG: Sinus rhythm with first-degree AV block.  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  PVC noted.\par \par 87-year-old man with a past medical history of hypertension, dyslipidemia, AS status post bio AVR, CAD status post CABG, DVT who presents to me for f/u.  Patient continues to do well.  Dizziness has improved since the decrease in nifedipine and his blood pressures have remained controlled.  He gets a little bit of shortness of breath on top of stairs but this appears to be more deconditioning and I encouraged him to be more active.  No evidence of heart failure at this time.  EKG appears unchanged.

## 2022-11-07 NOTE — HISTORY OF PRESENT ILLNESS
[FreeTextEntry1] : Patient presents back today feeling well.  He reports some shortness of breath when he gets the top of stairs but this is not something that is changed at all.  He admits that he needs to be more active.  Blood pressures have been in the 100s to 130s over 60s to 70s since the decrease in nifedipine which he seems to be tolerating.  No issues with edema at all.  Patient denies chest pain, shortness of breath, palpitations, orthopnea, presyncope, syncope.

## 2022-11-07 NOTE — PHYSICAL EXAM
[General Appearance - In No Acute Distress] : no acute distress [Normal Conjunctiva] : the conjunctiva exhibited no abnormalities [Normal Oral Mucosa] : normal oral mucosa [Auscultation Breath Sounds / Voice Sounds] : lungs were clear to auscultation bilaterally [Normal Rate] : normal [Rhythm Regular] : regular [Normal S1] : normal S1 [Normal S2] : normal S2 [S4] : an S4 was heard [III] : a grade 3 [Abdomen Soft] : soft [Abdomen Tenderness] : non-tender [Nail Clubbing] : no clubbing of the fingernails [Cyanosis, Localized] : no localized cyanosis [Oriented To Time, Place, And Person] : oriented to person, place, and time [Affect] : the affect was normal [S3] : no S3 [FreeTextEntry1] : No significant lower extremity edema

## 2022-11-07 NOTE — DISCUSSION/SUMMARY
[FreeTextEntry1] : 1. Continue current cardiac meds in doses as noted above for hypertension, CAD, AVR, dyslipidemia.  \par 2. Device checks as scheduled.\par 3. Continue anticoagulation for DVT.  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 Monitor BP at home, keep a log and bring to f/u.\par 5. Patient is encouraged to exercise at least 30 minutes a day everyday of the week.\par 6. No additional cardiac testing at this time.\par 7. Follow up here in 4 months. [EKG obtained to assist in diagnosis and management of assessed problem(s)] : EKG obtained to assist in diagnosis and management of assessed problem(s)

## 2022-12-30 NOTE — H&P PST ADULT - NSANTHTOTALSCORECAL_ENT_A_CORE
4

## 2023-01-03 ENCOUNTER — APPOINTMENT (OUTPATIENT)
Dept: CARDIOLOGY | Facility: CLINIC | Age: 88
End: 2023-01-03
Payer: MEDICARE

## 2023-01-03 PROCEDURE — 93294 REM INTERROG EVL PM/LDLS PM: CPT

## 2023-01-03 PROCEDURE — 93296 REM INTERROG EVL PM/IDS: CPT

## 2023-01-18 ENCOUNTER — RX RENEWAL (OUTPATIENT)
Age: 88
End: 2023-01-18

## 2023-03-09 ENCOUNTER — NON-APPOINTMENT (OUTPATIENT)
Age: 88
End: 2023-03-09

## 2023-03-09 ENCOUNTER — APPOINTMENT (OUTPATIENT)
Dept: CARDIOLOGY | Facility: CLINIC | Age: 88
End: 2023-03-09
Payer: MEDICARE

## 2023-03-09 VITALS
HEIGHT: 66 IN | HEART RATE: 74 BPM | WEIGHT: 180 LBS | DIASTOLIC BLOOD PRESSURE: 80 MMHG | BODY MASS INDEX: 28.93 KG/M2 | RESPIRATION RATE: 16 BRPM | SYSTOLIC BLOOD PRESSURE: 150 MMHG | OXYGEN SATURATION: 97 %

## 2023-03-09 PROCEDURE — 93000 ELECTROCARDIOGRAM COMPLETE: CPT

## 2023-03-09 PROCEDURE — 99214 OFFICE O/P EST MOD 30 MIN: CPT

## 2023-03-09 NOTE — ASSESSMENT
[FreeTextEntry1] : Echocardiogram February 22, 2021 demonstrated left ventricle normal size and function with ejection fraction of 55 to 60%.  Mild concentric LVH.  Mildly dilated left atrium.  Moderate to severe aortic stenosis.  Trace mitral, mild aortic and mild tricuspid regurgitation.  Mild dilatation of the aortic root and ascending aorta.\par \par Cardiac catheterization May 5, 2021 demonstrated 60% distal left main stenosis.  70% mid LAD stenosis.  80% ostial circumflex stenosis with 90% OM1 stenosis and a 50% more distal OM1 stenosis.  40% proximal and distal RCA stenoses.\par \par Echocardiogram July 14, 2022 demonstrated left ventricle normal size and function with ejection fraction of 55 to 60%.  Mild concentric LVH.  Normal ventricular size with normal function.  Moderately dilated left atrium.  Mildly dilated right atrium.  Normal functioning bioprosthetic aortic valve.  Mild mitral and mild to moderate tricuspid regurgitation.  Mild dilatation of the aortic root.\par \par EKG: Sinus rhythm with first-degree AV block.  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  Patient continues to do well from a cardiovascular standpoint.  He is just getting over pneumonia but this appears to have now resolved.  I encouraged him to try and be more active which should help with his weakness.  I am a bit concerned about his blood pressure is a bit elevated here today.  He says he has been checking it at home and will get me a list.  I will not make any changes to his medication for today.  No evidence of heart for at this time.  EKG is unchanged.  Recent device interrogation shows a normal functioning device and no events.

## 2023-03-09 NOTE — DISCUSSION/SUMMARY
[FreeTextEntry1] : 1. Continue current cardiac meds in doses as noted above for hypertension, CAD, AVR, dyslipidemia.  \par 2. Device checks as scheduled.\par 3. Continue anticoagulation for DVT.  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 Monitor BP at home, keep a log and bring to f/u.  He will send me a list of his recent readings.\par 5. Patient is encouraged to exercise at least 30 minutes a day everyday of the week.\par 6. No additional cardiac testing at this time.\par 7. Follow up here in 4 months. [EKG obtained to assist in diagnosis and management of assessed problem(s)] : EKG obtained to assist in diagnosis and management of assessed problem(s)

## 2023-03-09 NOTE — HISTORY OF PRESENT ILLNESS
[FreeTextEntry1] : SymptomPatient presents back today just recovering from a pneumonia that he had on February 12.  He had a sore throat and cough primarily and also a lot of weakness.  He took antibiotics and eventually the symptoms all resolved.  He is still feeling a bit weak but otherwise better.  He did not really have any shortness of breath at all and reports no shortness of breath at this time.  He admits to not being very active over the last month while having the pneumonia.  He is tolerating his medication without a problem.  He has been checking his blood pressure at home but did not bring a list with him here today.  Patient denies chest pain, palpitations, orthopnea, presyncope, syncope.

## 2023-03-12 NOTE — CONSULT NOTE ADULT - CONSULT REASON
Post-op bigeminy and hypotension
Antibiotics s/p removal of left ttibila IM navin
bigeminy/PVCs
(M6) obeys commands

## 2023-04-03 ENCOUNTER — APPOINTMENT (OUTPATIENT)
Dept: CARDIOLOGY | Facility: CLINIC | Age: 88
End: 2023-04-03
Payer: MEDICARE

## 2023-04-03 PROCEDURE — 93288 INTERROG EVL PM/LDLS PM IP: CPT

## 2023-04-04 ENCOUNTER — NON-APPOINTMENT (OUTPATIENT)
Age: 88
End: 2023-04-04

## 2023-04-13 ENCOUNTER — APPOINTMENT (OUTPATIENT)
Dept: THORACIC SURGERY | Facility: CLINIC | Age: 88
End: 2023-04-13
Payer: MEDICARE

## 2023-04-13 ENCOUNTER — OUTPATIENT (OUTPATIENT)
Dept: OUTPATIENT SERVICES | Facility: HOSPITAL | Age: 88
LOS: 1 days | End: 2023-04-13

## 2023-04-13 VITALS
OXYGEN SATURATION: 98 % | BODY MASS INDEX: 28.93 KG/M2 | WEIGHT: 180 LBS | SYSTOLIC BLOOD PRESSURE: 165 MMHG | HEART RATE: 85 BPM | HEIGHT: 66 IN | RESPIRATION RATE: 16 BRPM | TEMPERATURE: 98.7 F | DIASTOLIC BLOOD PRESSURE: 87 MMHG

## 2023-04-13 DIAGNOSIS — J18.0 BRONCHOPNEUMONIA, UNSPECIFIED ORGANISM: ICD-10-CM

## 2023-04-13 DIAGNOSIS — Z98.890 OTHER SPECIFIED POSTPROCEDURAL STATES: Chronic | ICD-10-CM

## 2023-04-13 DIAGNOSIS — Z95.1 PRESENCE OF AORTOCORONARY BYPASS GRAFT: Chronic | ICD-10-CM

## 2023-04-13 DIAGNOSIS — Z95.828 PRESENCE OF OTHER VASCULAR IMPLANTS AND GRAFTS: Chronic | ICD-10-CM

## 2023-04-13 PROCEDURE — 99214 OFFICE O/P EST MOD 30 MIN: CPT

## 2023-04-13 NOTE — PHYSICAL EXAM
[General Appearance - Alert] : alert [General Appearance - In No Acute Distress] : in no acute distress [Sclera] : the sclera and conjunctiva were normal [Outer Ear] : the ears and nose were normal in appearance [Neck Appearance] : the appearance of the neck was normal [Jugular Venous Distention Increased] : there was no jugular-venous distention [Respiration, Rhythm And Depth] : normal respiratory rhythm and effort [Auscultation Breath Sounds / Voice Sounds] : lungs were clear to auscultation bilaterally [Heart Rate And Rhythm] : heart rate was normal and rhythm regular [Heart Sounds] : normal S1 and S2 [Abnormal Walk] : normal gait [Skin Color & Pigmentation] : normal skin color and pigmentation [Skin Turgor] : normal skin turgor [Oriented To Time, Place, And Person] : oriented to person, place, and time [Impaired Insight] : insight and judgment were intact [Affect] : the affect was normal [Mood] : the mood was normal [Memory Recent] : recent memory was not impaired [FreeTextEntry2] : no edema

## 2023-04-13 NOTE — HISTORY OF PRESENT ILLNESS
[FreeTextEntry1] : Mr. ALCANTARA is a 87 year old male referred by Dr. Thakkar and Dr. Harrington who presents for consultation. \par \par His past medical history includes coronary artery disease, HTN, HLD, aortic dilation, pacemaker, aortic valve replacement and CABG. Pneumonia 2/12/2023\par \par He had recent CT imaging performed with Cardiology revealing GGO of the lungs. \par \par Today the patient reports feeling well. Patient denies chest pain, palpitations, shortness of breath, cough, fevers, chills, fatigue and unintentional weight loss or gain. \par \par

## 2023-04-13 NOTE — CONSULT LETTER
[Dear  ___] : Dear  [unfilled], [Consult Letter:] : I had the pleasure of evaluating your patient, [unfilled]. [( Thank you for referring [unfilled] for consultation for _____ )] : Thank you for referring [unfilled] for consultation for [unfilled] [Please see my note below.] : Please see my note below. [Consult Closing:] : Thank you very much for allowing me to participate in the care of this patient.  If you have any questions, please do not hesitate to contact me. [Sincerely,] : Sincerely, [FreeTextEntry2] : Ariane [FreeTextEntry3] : Noble Miramontes MD\par Director of Thoracic Surgery, [facility]\par  Cardiovascular & Thoracic Surgery\par [facility]\par [address] [place] [address]\[place] [address]\par Tel. [phone]\par Fax [phone]

## 2023-04-13 NOTE — DATA REVIEWED
[FreeTextEntry1] : .CT Scan of the chest from [facility] on 03/16/23\par - New right upper lobe peribronchial GGO spiculation as well as masslike opacities may represent subacute inflammatory process/pneumonia. Follow up CT in 3 months recommended.\par - Bilateral areas of tree in bud micronodular opacities, likely inflammatory or mucoid impaction \par - Moderately dilated 4.5 cm mid ascending thoracic aorta\par - Right kidney nephrolithiasis\par - Extensive coronary artery calcification \par - Mild cardiomegaly

## 2023-04-13 NOTE — ASSESSMENT
[FreeTextEntry1] : I had the pleasure of evaluating Mr. KAYLEIGH ALCANTARA today. Briefly, this is a 87 year old male who has been found to have lung nodules and ground glass opacities. This is a new patient to my practice and I am glad for the opportunity to monitor the nodules to mitigate the risk for advancing disease.\par \par After reviewing the imaging, it was determined that there are nodules present in the lung. We discussed that nodules can be of infectious, inflammatory or malignant etiologies. When evaluating suspicious nodules, we look at certain characteristics of nodules such as size, consistency, shape and rate of growth. \par \par Moving forward our plan is to obtain a follow up surveillance CT chest in 3 months and at that time Mr. ALCANTARA will return to care in my office to discuss the findings. All questions were answered, concerns were addressed and the patient expressed understanding and compliance with the followup plan.\par \par Summary of plan:\par - CT chest in 3 months\par - Return to care after testing to review results\par \par I, Dr. Noble Miramontes, personally performed the evaluation and management (E/M) services for this new patient.  That E/M includes conducting the initial examination, assessing all conditions, and establishing the plan of care.  Today, Pastor Gustafson PA-C, was here to observe my evaluation and management services for this patient to be followed going forward.\par \par

## 2023-05-24 NOTE — HISTORY OF PRESENT ILLNESS
[FreeTextEntry1] : Presents back to the office today feeling about the same. He still has some fatigue when he exerts himself but this has not changed significantly recently. He never had repeat echocardiogram but his event monitor was performed showing some heart block but nothing very significant and he has not had any symptoms. He denies dizziness or lightheadedness. Blood pressures have been in the 120s over 70s per his report but he did not bring a list with him here today. He is tolerating all of his medications. Patient denies chest pain, shortness of breath, palpitations, orthopnea, presyncope, syncope. - Continue home Synthroid - Continue home Synthroid - Continue home Synthroid - Continue home Synthroid
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
## 2023-06-01 ENCOUNTER — APPOINTMENT (OUTPATIENT)
Dept: ELECTROPHYSIOLOGY | Facility: CLINIC | Age: 88
End: 2023-06-01

## 2023-07-03 ENCOUNTER — APPOINTMENT (OUTPATIENT)
Dept: CARDIOLOGY | Facility: CLINIC | Age: 88
End: 2023-07-03
Payer: MEDICARE

## 2023-07-03 PROCEDURE — 93288 INTERROG EVL PM/LDLS PM IP: CPT

## 2023-08-02 ENCOUNTER — APPOINTMENT (OUTPATIENT)
Dept: CARDIOLOGY | Facility: CLINIC | Age: 88
End: 2023-08-02
Payer: MEDICARE

## 2023-08-02 PROCEDURE — ZZZZZ: CPT

## 2023-08-03 ENCOUNTER — APPOINTMENT (OUTPATIENT)
Dept: THORACIC SURGERY | Facility: CLINIC | Age: 88
End: 2023-08-03
Payer: MEDICARE

## 2023-08-03 VITALS
HEART RATE: 74 BPM | RESPIRATION RATE: 16 BRPM | SYSTOLIC BLOOD PRESSURE: 143 MMHG | DIASTOLIC BLOOD PRESSURE: 81 MMHG | OXYGEN SATURATION: 96 %

## 2023-08-03 DIAGNOSIS — J47.9 BRONCHIECTASIS, UNCOMPLICATED: ICD-10-CM

## 2023-08-03 DIAGNOSIS — R91.8 OTHER NONSPECIFIC ABNORMAL FINDING OF LUNG FIELD: ICD-10-CM

## 2023-08-03 PROCEDURE — 99213 OFFICE O/P EST LOW 20 MIN: CPT

## 2023-08-07 PROBLEM — J47.9 BRONCHIECTASIS: Status: ACTIVE | Noted: 2023-08-07

## 2023-08-07 NOTE — ASSESSMENT
[FreeTextEntry1] : Mr Prado presents to the office today to discuss his most recent CT findings and comparison. Independent review of imaging was performed revealing GGO and scar tissue as well as bilateral small nodules. Surveillance imaging will continue with CT in 1 year recommended. He is agreeable and will return to care after imaging.   PLAN: - CT Scan of the chest in 1 year       I, Dr. Miramontes personally performed the evaluation and management (E/M) services for this patient. That E/M includes conducting the initial examination, assessing all conditions, and establishing the plan of care. Today, Lion Jean-Baptiste NP was here to observe my evaluation and management services for this patient.

## 2023-08-07 NOTE — HISTORY OF PRESENT ILLNESS
[FreeTextEntry1] : Mr. KAYLEIGH ALCANTARA is a 88 year male who presents today for followup. Previously, he has been followed for lung nodules and ground glass opacities. He presents today to review and discuss routine surveillance CT Chest test results.   Additional past medical history includes coronary artery disease, HTN, HLD, aortic dilation, pacemaker, aortic valve replacement and CABG. Pneumonia 2/12/2023  Today, the patient reports feeling well. Denies any cough, shortness of breath, fever, chills, unintentional weight loss/gain, and night sweats. Recent CT Imaging was obtained for surveillance, and he presents to discuss findings.

## 2023-08-07 NOTE — DATA REVIEWED
[FreeTextEntry1] :  [facility] 07/12/2023 CT-CHEST NON CONTRAST  History: J18.0 Bronchopneumonia  COMPARISON: Prior chest CT studies from 3/16/2023 through 3/17/2006 including intervening PET/CT studies  LUNGS: There are significantly improved dense thick patchy and linear branching opacities scattered throughout the right upper lobe on the prior studies which were predominantly in the apex with mild residual elements of thin linear stranding currently present felt related to residual scarring and/or atelectasis. Correlate to exclude any lingering infiltrate. There is persistence slight linear atelectasis in the posterior medial right lower lobe. There is a stable 5 mm nodule lateral right middle lobe (image 215 Series 4) and 2 to 3 mm nodule lateral left lingula segment (image 143 series 4). Clustered tree-in-bud nodules in the posterior left upper lobe superiorly noted on the recent prior study have resolved. The lung parenchyma is otherwise unremarkable.  PLEURA: There are no pleural effusions. There are no pleural plaques or other pleural pathology.  TRACHEOBRONCHIAL TREE: There is again mild bibasilar bronchiectasis which remain stable.  LYMPH NODES/MEDIASTINUM: There is no definitive evidence of adenopathy.  HEART AND GREAT VESSELS: Grossly there is patchy dense calcification of the aorta and coronary vasculature with post CABG changes and dual-chamber cardiac pacer device. This again the presence of an aortic valvular prosthesis. The ascending thoracic aorta is again prominent measuring up to 3 cm in maximal diameter which is stable.  VISUALIZED THYROID: Grossly appears normal and symmetrical  VISUALIZED INTRA-ABDOMINAL CONTENTS: Grossly there is a 9 mm nonobstructing calculus in the midpole periphery the right renal collecting system again evident. Redemonstrated is an ovoid 28 mm gallstone within the gallbladder lumen.  BONY AND SOFT TISSUE STRUCTURES: There is again extensive degenerative disc space narrowing and mild endplate ossific lipping throughout the axial spine. There or again moderate degenerative changes both shoulders..  The remainder of the study is unremarkable.  IMPRESSION:  Significant improved dense thick patchy branching parenchymal opacities right upper lobe particularly near the apex where there are mild residual linear opacities with some groundglass attenuation felt likely related to residual scarring and/or atelectasis yet correlate to exclude any lingering infiltrate.  Stable right middle lobe and left upper lobe nodule long-term favoring benign etiology.  Stable mild bibasilar bronchiectasis.  Stable exam otherwise  Signed by: Carlos Murphy MD Signed Date: 7/15/2023 1:05 PM EDT    SIGNED BY: Carlos Murphy M.D., Ext. [phone] 07/15/2023 01:05 PM

## 2023-08-07 NOTE — REVIEW OF SYSTEMS
[Negative] : Heme/Lymph [Feeling Poorly] : not feeling poorly [Feeling Tired] : not feeling tired [Chest Pain] : no chest pain [Shortness Of Breath] : no shortness of breath [SOB on Exertion] : no shortness of breath during exertion

## 2023-08-14 ENCOUNTER — NON-APPOINTMENT (OUTPATIENT)
Age: 88
End: 2023-08-14

## 2023-08-14 ENCOUNTER — APPOINTMENT (OUTPATIENT)
Dept: CARDIOLOGY | Facility: CLINIC | Age: 88
End: 2023-08-14
Payer: MEDICARE

## 2023-08-14 VITALS
RESPIRATION RATE: 16 BRPM | BODY MASS INDEX: 29.73 KG/M2 | WEIGHT: 185 LBS | DIASTOLIC BLOOD PRESSURE: 86 MMHG | HEIGHT: 66 IN | SYSTOLIC BLOOD PRESSURE: 166 MMHG | HEART RATE: 66 BPM

## 2023-08-14 DIAGNOSIS — E78.5 HYPERLIPIDEMIA, UNSPECIFIED: ICD-10-CM

## 2023-08-14 PROCEDURE — 93000 ELECTROCARDIOGRAM COMPLETE: CPT

## 2023-08-14 PROCEDURE — 99214 OFFICE O/P EST MOD 30 MIN: CPT

## 2023-08-14 RX ORDER — METOPROLOL TARTRATE 50 MG/1
50 TABLET, FILM COATED ORAL
Refills: 0 | Status: DISCONTINUED | COMMUNITY
End: 2023-08-14

## 2023-08-14 NOTE — PHYSICAL EXAM
[General Appearance - In No Acute Distress] : no acute distress [Normal Conjunctiva] : the conjunctiva exhibited no abnormalities [Normal Oral Mucosa] : normal oral mucosa [Auscultation Breath Sounds / Voice Sounds] : lungs were clear to auscultation bilaterally [Normal Rate] : normal [Rhythm Regular] : regular [Normal S1] : normal S1 [Normal S2] : normal S2 [S4] : an S4 was heard [III] : a grade 3 [Abdomen Soft] : soft [Abdomen Tenderness] : non-tender [Nail Clubbing] : no clubbing of the fingernails [Cyanosis, Localized] : no localized cyanosis [Oriented To Time, Place, And Person] : oriented to person, place, and time [Affect] : the affect was normal [S3] : no S3 [FreeTextEntry1] : Swelling in the ankles bilaterally right greater than left.  Trace pretibial edema bilaterally.

## 2023-08-14 NOTE — ASSESSMENT
[FreeTextEntry1] : Echocardiogram February 22, 2021 demonstrated left ventricle normal size and function with ejection fraction of 55 to 60%.  Mild concentric LVH.  Mildly dilated left atrium.  Moderate to severe aortic stenosis.  Trace mitral, mild aortic and mild tricuspid regurgitation.  Mild dilatation of the aortic root and ascending aorta.  Cardiac catheterization May 5, 2021 demonstrated 60% distal left main stenosis.  70% mid LAD stenosis.  80% ostial circumflex stenosis with 90% OM1 stenosis and a 50% more distal OM1 stenosis.  40% proximal and distal RCA stenoses.  Echocardiogram July 14, 2022 demonstrated left ventricle normal size and function with ejection fraction of 55 to 60%.  Mild concentric LVH.  Normal ventricular size with normal function.  Moderately dilated left atrium.  Mildly dilated right atrium.  Normal functioning bioprosthetic aortic valve.  Mild mitral and mild to moderate tricuspid regurgitation.  Mild dilatation of the aortic root.  EKG: Sinus rhythm with first-degree AV block.  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  88-year-old man with a past medical history of hypertension, dyslipidemia, AS status post bio AVR, CAD status post CABG, trifascicular block status post pacemaker, DVT who presents to me for f/u.  Patient continues to do well from a cardiovascular standpoint.  Blood pressures however are a little bit higher.  His 24-hour monitor showed an average of 138/93 which is higher during the day.  I do believe adjusting his medication would be helpful.  We discussed the possibility of adding an additional medication, but he would like to try and avoid that.  For now, we will increase metoprolol.  He does have a trifascicular block, but he has a pacemaker so this is not an issue.  I am hopeful that the metoprolol will also help lower his diastolic blood pressure more.  If he has any dizziness or lightheadedness or other symptoms, he will let me know.  I will also change him to ER to try and get better 24-hour level coverage.  He is having a little bit of edema especially in his ankles.  The ankles may be more secondary to arthritis, and he has broken the right ankle before.  No overt evidence of acute heart failure at this time.

## 2023-08-14 NOTE — HISTORY OF PRESENT ILLNESS
[FreeTextEntry1] : Patient presents back to the office today a bit concerned about his blood pressure which seems to be higher.  He has made no changes to his medication and is taking everything faithfully.  He feels physically well.  He has had a little more swelling in his ankles especially on the right but no other physical symptoms.  He reports that he still gets a little short of breath when he goes upstairs but this has been chronic and stable and is still much improved since his surgery.  No other shortness of breath.  Patient denies chest pain, palpitations, orthopnea, presyncope, syncope.

## 2023-08-14 NOTE — DISCUSSION/SUMMARY
[FreeTextEntry1] : 1. Change metoprolol to 100 mg twice daily in the ER form. 2. Continue other current cardiac meds in doses as noted above for hypertension, CAD, AVR, dyslipidemia.   3. Device checks as scheduled. 4. Continue anticoagulation for DVT.  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 5. Monitor BP at home, keep a log and bring to f/u.  He will get a new machine and bring it in to be calibrated to ensure it is accurate. 6. Patient is encouraged to exercise at least 30 minutes a day every day of the week. 7. No additional cardiac testing at this time. 8. Follow up here in 3 months. [EKG obtained to assist in diagnosis and management of assessed problem(s)] : EKG obtained to assist in diagnosis and management of assessed problem(s)

## 2023-09-07 NOTE — H&P ADULT - NSICDXFAMHXPERTINENTNEGATIVE_GEN_A_CORE_FT
Patient Additional Notes: Recommended Nutrafol supplements sold otc Detail Level: Simple Render Risk Assessment In Note?: no

## 2023-09-27 ENCOUNTER — RX RENEWAL (OUTPATIENT)
Age: 88
End: 2023-09-27

## 2023-10-02 ENCOUNTER — APPOINTMENT (OUTPATIENT)
Dept: CARDIOLOGY | Facility: CLINIC | Age: 88
End: 2023-10-02
Payer: MEDICARE

## 2023-10-02 PROCEDURE — 93288 INTERROG EVL PM/LDLS PM IP: CPT

## 2023-10-20 NOTE — PHYSICAL EXAM
BG readings reviewed and on most recent days, her readings are persistently stable (without lower glucose readings in AM). Please ask patient to continue with current eating patterns and medication regimen. Soliqua 22 units once daily in AM (before first meal)        Thank you! [General Appearance - In No Acute Distress] : no acute distress [Normal Conjunctiva] : the conjunctiva exhibited no abnormalities [Normal Oral Mucosa] : normal oral mucosa [Auscultation Breath Sounds / Voice Sounds] : lungs were clear to auscultation bilaterally [Abdomen Soft] : soft [Abdomen Tenderness] : non-tender [Cyanosis, Localized] : no localized cyanosis [Nail Clubbing] : no clubbing of the fingernails [Oriented To Time, Place, And Person] : oriented to person, place, and time [Affect] : the affect was normal [Normal Rate] : normal [Normal S1] : normal S1 [Rhythm Regular] : regular [Normal S2] : normal S2 [III] : a grade 3 [S4] : an S4 was heard [FreeTextEntry1] : No JVD, no carotid bruits. [S3] : no S3

## 2023-10-26 ENCOUNTER — RX RENEWAL (OUTPATIENT)
Age: 88
End: 2023-10-26

## 2023-10-26 RX ORDER — NIFEDIPINE 30 MG/1
30 TABLET, EXTENDED RELEASE ORAL
Qty: 180 | Refills: 2 | Status: ACTIVE | COMMUNITY
Start: 2021-08-13 | End: 1900-01-01

## 2023-11-16 NOTE — PHYSICAL THERAPY INITIAL EVALUATION ADULT - STANDING BALANCE: STATIC
What Type Of Note Output Would You Prefer (Optional)?: Bullet Format Hpi Title: Evaluation of Skin Lesions How Severe Are Your Spot(S)?: mild Have Your Spot(S) Been Treated In The Past?: has not been treated good minus

## 2023-12-18 ENCOUNTER — APPOINTMENT (OUTPATIENT)
Dept: CARDIOLOGY | Facility: CLINIC | Age: 88
End: 2023-12-18

## 2024-01-03 ENCOUNTER — APPOINTMENT (OUTPATIENT)
Dept: CARDIOLOGY | Facility: CLINIC | Age: 89
End: 2024-01-03
Payer: MEDICARE

## 2024-01-03 PROCEDURE — 93288 INTERROG EVL PM/LDLS PM IP: CPT

## 2024-02-07 NOTE — CONSULT NOTE ADULT - CONSULT REASON
Cleared by vascular surgery for weight bearing status  Started limb  to right AKA  Follow up with  clinic to be fitted for prosthetic  Monitor site for any breakdown in skin   L knee pain

## 2024-02-15 NOTE — BRIEF OPERATIVE NOTE - DISPOSITION
Pt also wants Dr. Levy to put in script for Hydrocodone and [id number] - [place], [place] - [address]      Please advise [phone] thank you.    [facility]

## 2024-03-20 ENCOUNTER — RX RENEWAL (OUTPATIENT)
Age: 89
End: 2024-03-20

## 2024-03-20 RX ORDER — APIXABAN 5 MG/1
5 TABLET, FILM COATED ORAL
Qty: 180 | Refills: 1 | Status: ACTIVE | COMMUNITY
Start: 2021-12-21 | End: 1900-01-01

## 2024-03-20 RX ORDER — METOPROLOL SUCCINATE 100 MG/1
100 TABLET, EXTENDED RELEASE ORAL
Qty: 180 | Refills: 0 | Status: ACTIVE | COMMUNITY
Start: 2023-08-14 | End: 1900-01-01

## 2024-04-09 ENCOUNTER — APPOINTMENT (OUTPATIENT)
Dept: CARDIOLOGY | Facility: CLINIC | Age: 89
End: 2024-04-09

## 2024-04-15 ENCOUNTER — APPOINTMENT (OUTPATIENT)
Dept: CARDIOLOGY | Facility: CLINIC | Age: 89
End: 2024-04-15

## 2024-04-22 ENCOUNTER — APPOINTMENT (OUTPATIENT)
Dept: CARDIOLOGY | Facility: CLINIC | Age: 89
End: 2024-04-22
Payer: MEDICARE

## 2024-04-22 PROCEDURE — 93288 INTERROG EVL PM/LDLS PM IP: CPT

## 2024-05-13 ENCOUNTER — NON-APPOINTMENT (OUTPATIENT)
Age: 89
End: 2024-05-13

## 2024-05-13 ENCOUNTER — APPOINTMENT (OUTPATIENT)
Dept: CARDIOLOGY | Facility: CLINIC | Age: 89
End: 2024-05-13
Payer: MEDICARE

## 2024-05-13 VITALS
RESPIRATION RATE: 16 BRPM | HEIGHT: 66 IN | BODY MASS INDEX: 28.61 KG/M2 | HEART RATE: 74 BPM | DIASTOLIC BLOOD PRESSURE: 83 MMHG | WEIGHT: 178 LBS | SYSTOLIC BLOOD PRESSURE: 137 MMHG

## 2024-05-13 DIAGNOSIS — Z95.2 PRESENCE OF PROSTHETIC HEART VALVE: ICD-10-CM

## 2024-05-13 DIAGNOSIS — Z95.1 PRESENCE OF AORTOCORONARY BYPASS GRAFT: ICD-10-CM

## 2024-05-13 PROCEDURE — G2211 COMPLEX E/M VISIT ADD ON: CPT

## 2024-05-13 PROCEDURE — 99215 OFFICE O/P EST HI 40 MIN: CPT

## 2024-05-13 PROCEDURE — 93000 ELECTROCARDIOGRAM COMPLETE: CPT

## 2024-05-13 NOTE — DISCUSSION/SUMMARY
[EKG obtained to assist in diagnosis and management of assessed problem(s)] : EKG obtained to assist in diagnosis and management of assessed problem(s) [FreeTextEntry1] : 1. Check echocardiogram and nuclear stress test given his shortness of breath on exertion and the history of CAD and AVR.  He will have blood work done as well. 2. Continue current cardiac meds in doses as noted above for hypertension, CAD, AVR, dyslipidemia including atorvastatin 40 mg daily. 3. Follow-up with his primary and consider pulmonary evaluation as well for his shortness of breath.  Considered PE but he has been on Eliquis and has not missed any doses. 4. Device checks as scheduled. 5. Continue anticoagulation for DVT.  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 6. Monitor BP at home, keep a log and bring to f/u.   7. Patient is encouraged to exercise at least 30 minutes a day every day of the week. 8. No additional cardiac testing at this time. 9. Follow up here after testing, and will make further recommendations at that time.

## 2024-05-13 NOTE — HISTORY OF PRESENT ILLNESS
[FreeTextEntry1] : Patient presents back to the office today concerned because he has been having some more shortness of breath recently.  He notes that for the last month or 2 he has been having a lot of shortness of breath and even low levels of exertion.  He did have a cough and upper respiratory tract infection just before the shortness of breath started.  He was treated with antibiotics and other medications and eventually the symptoms are resolved with the exception of the cough and now he is short of breath.  He can only walk short distances before he has to sit down because of the shortness of breath.  He does not report any other symptoms with that.  He does have some dizziness later in the mornings, a couple of hours after taking his medication.  Patient denies chest pain, palpitations, orthopnea, presyncope, syncope.

## 2024-05-13 NOTE — ASSESSMENT
[FreeTextEntry1] : Echocardiogram February 22, 2021 demonstrated left ventricle normal size and function with ejection fraction of 55 to 60%.  Mild concentric LVH.  Mildly dilated left atrium.  Moderate to severe aortic stenosis.  Trace mitral, mild aortic and mild tricuspid regurgitation.  Mild dilatation of the aortic root and ascending aorta.  Cardiac catheterization May 5, 2021 demonstrated 60% distal left main stenosis.  70% mid LAD stenosis.  80% ostial circumflex stenosis with 90% OM1 stenosis and a 50% more distal OM1 stenosis.  40% proximal and distal RCA stenoses.  Echocardiogram July 14, 2022 demonstrated left ventricle normal size and function with ejection fraction of 55 to 60%.  Mild concentric LVH.  Normal ventricular size with normal function.  Moderately dilated left atrium.  Mildly dilated right atrium.  Normal functioning bioprosthetic aortic valve.  Mild mitral and mild to moderate tricuspid regurgitation.  Mild dilatation of the aortic root.  EKG: Sinus rhythm with first-degree AV block.  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  PACs.  88-year-old man with a past medical history of hypertension, dyslipidemia, AS status post bio AVR, CAD status post CABG, trifascicular block status post pacemaker, DVT s/p IVC filter who presents to me for f/u.  Patient returns today complaining of shortness of breath on exertion with even short distances.  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  There is no evidence of heart failure on exam.  EKG is unremarkable which is some PACs.  Recent device interrogation showed no events.  Given his symptoms however and his history I have recommended echocardiogram and stress testing.  He should also follow with his primary for further workup from a pulmonary standpoint.  Ultimately if workup is negative more activity would likely help a lot with his symptoms.  With regards to his dizziness this is likely secondary to dehydration in the mornings and I have asked him to drink a little bit more in the morning.

## 2024-05-14 ENCOUNTER — APPOINTMENT (OUTPATIENT)
Dept: CARDIOLOGY | Facility: CLINIC | Age: 89
End: 2024-05-14
Payer: MEDICARE

## 2024-05-14 PROCEDURE — A9500: CPT

## 2024-05-14 PROCEDURE — 78452 HT MUSCLE IMAGE SPECT MULT: CPT

## 2024-05-14 PROCEDURE — 93015 CV STRESS TEST SUPVJ I&R: CPT

## 2024-05-14 RX ORDER — EVOLOCUMAB 420 MG/3.5
420 KIT SUBCUTANEOUS
Refills: 0 | Status: COMPLETED | OUTPATIENT
Start: 2024-05-14

## 2024-05-14 RX ADMIN — EVOLOCUMAB 0 MG/3.5ML: KIT SUBCUTANEOUS at 00:00

## 2024-05-23 ENCOUNTER — APPOINTMENT (OUTPATIENT)
Dept: CARDIOLOGY | Facility: CLINIC | Age: 89
End: 2024-05-23
Payer: MEDICARE

## 2024-05-23 PROCEDURE — 93306 TTE W/DOPPLER COMPLETE: CPT

## 2024-06-10 ENCOUNTER — APPOINTMENT (OUTPATIENT)
Dept: CARDIOLOGY | Facility: CLINIC | Age: 89
End: 2024-06-10
Payer: MEDICARE

## 2024-06-10 VITALS
WEIGHT: 183 LBS | SYSTOLIC BLOOD PRESSURE: 143 MMHG | RESPIRATION RATE: 16 BRPM | DIASTOLIC BLOOD PRESSURE: 77 MMHG | BODY MASS INDEX: 29.41 KG/M2 | HEART RATE: 65 BPM | HEIGHT: 66 IN

## 2024-06-10 DIAGNOSIS — I35.0 NONRHEUMATIC AORTIC (VALVE) STENOSIS: ICD-10-CM

## 2024-06-10 DIAGNOSIS — I25.10 ATHEROSCLEROTIC HEART DISEASE OF NATIVE CORONARY ARTERY W/OUT ANGINA PECTORIS: ICD-10-CM

## 2024-06-10 DIAGNOSIS — R06.02 SHORTNESS OF BREATH: ICD-10-CM

## 2024-06-10 DIAGNOSIS — I10 ESSENTIAL (PRIMARY) HYPERTENSION: ICD-10-CM

## 2024-06-10 PROCEDURE — G2211 COMPLEX E/M VISIT ADD ON: CPT

## 2024-06-10 PROCEDURE — 99214 OFFICE O/P EST MOD 30 MIN: CPT

## 2024-06-10 RX ORDER — CHLORTHALIDONE 25 MG/1
25 TABLET ORAL DAILY
Qty: 90 | Refills: 1 | Status: DISCONTINUED | COMMUNITY
End: 2024-06-10

## 2024-06-10 NOTE — ASSESSMENT
[FreeTextEntry1] : Echocardiogram February 22, 2021 demonstrated left ventricle normal size and function with ejection fraction of 55 to 60%.  Mild concentric LVH.  Mildly dilated left atrium.  Moderate to severe aortic stenosis.  Trace mitral, mild aortic and mild tricuspid regurgitation.  Mild dilatation of the aortic root and ascending aorta.  Cardiac catheterization May 5, 2021 demonstrated 60% distal left main stenosis.  70% mid LAD stenosis.  80% ostial circumflex stenosis with 90% OM1 stenosis and a 50% more distal OM1 stenosis.  40% proximal and distal RCA stenoses.  Echocardiogram July 14, 2022 demonstrated left ventricle normal size and function with ejection fraction of 55 to 60%.  Mild concentric LVH.  Normal ventricular size with normal function.  Moderately dilated left atrium.  Mildly dilated right atrium.  Normal functioning bioprosthetic aortic valve.  Mild mitral and mild to moderate tricuspid regurgitation.  Mild dilatation of the aortic root.  Echocardiogram May 23, 2024 demonstrated left ventricle normal size and function with ejection fraction of 60 to 65%.  Grade 1 left ventricular diastolic dysfunction with normal filling pressures.  Mild concentric LVH.  Severe left atrial enlargement, mild right atrial enlargement.  Mild mitral and moderate tricuspid regurgitation.  Normal functioning bioprosthetic aortic valve.  Ascending aorta dilated to 4.1 cm.  Pulmonary pressures mildly elevated at 42 mmHg.  Nuclear stress test May 14, 2024 was a pharmacologic study which was tolerated well.  Blood pressure response to infusion was normal.  EKG was nondiagnostic for ischemia.  Evaluation of nuclear imaging showed no evidence of ischemia or infarct and ejection fraction of 79%.  88-year-old man with a past medical history of hypertension, dyslipidemia, AS status post bio AVR, CAD status post CABG, trifascicular block status post pacemaker, DVT s/p IVC filter who presents to me for f/u.  Patient appears to be doing better with less shortness of breath.  Echocardiogram shows normal LV function and normal functioning bioprosthetic aortic valve.  Tricuspid regurgitation appears worse and pulmonary pressures are mildly elevated.  Atria have also further dilated.  There is some suggestion of diastolic heart failure but given that he is feeling better and his chlorthalidone was stopped because of low blood pressure I will hold off on any intervention.  Could consider low-dose diuretic but he seems to tend towards being dehydrated and given his improved symptoms and lack of other signs of heart failure I will hold off.  Stress testing shows no evidence of ischemia.  He had blood work which he was told was good except for his sugars which have always been somewhat borderline.  I will get a copy of that.  Blood pressure seems to be very well-controlled at home if not on the low side.

## 2024-06-10 NOTE — DISCUSSION/SUMMARY
[FreeTextEntry1] : 1. No additional cardiac testing at this time. 2. Continue current cardiac meds in doses as noted above for hypertension, CAD, AVR, dyslipidemia including atorvastatin 40 mg daily. 3. Follow-up with his primary. 4. Device checks as scheduled. 5. Continue anticoagulation for DVT.  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 6. Monitor BP at home, keep a log and bring to f/u.   7. Patient is encouraged to exercise at least 30 minutes a day every day of the week. 8. Patient encouraged to make sure he hydrates adequate he. 9.  Encourage patient to be as active as possible. 10.  Follow up here in three months.

## 2024-06-10 NOTE — HISTORY OF PRESENT ILLNESS
[FreeTextEntry1] : Patient presents back to the office today feeling better than last I saw him.  His shortness of breath on exertion has improved although he still gets it to some degree.  His chlorthalidone was stopped by his primary doctor because his blood pressures were low and he was getting dizzy.  More recently blood pressures he reports mostly in the 110s systolic.  He reports no edema at all.  Patient denies chest pain, shortness of breath, palpitations, orthopnea, presyncope, syncope.

## 2024-09-16 ENCOUNTER — APPOINTMENT (OUTPATIENT)
Dept: CARDIOLOGY | Facility: CLINIC | Age: 89
End: 2024-09-16
Payer: MEDICARE

## 2024-09-16 ENCOUNTER — NON-APPOINTMENT (OUTPATIENT)
Age: 89
End: 2024-09-16

## 2024-09-16 VITALS
HEART RATE: 62 BPM | WEIGHT: 187 LBS | HEIGHT: 66 IN | BODY MASS INDEX: 30.05 KG/M2 | DIASTOLIC BLOOD PRESSURE: 78 MMHG | SYSTOLIC BLOOD PRESSURE: 142 MMHG

## 2024-09-16 DIAGNOSIS — Z95.2 PRESENCE OF PROSTHETIC HEART VALVE: ICD-10-CM

## 2024-09-16 PROCEDURE — 99214 OFFICE O/P EST MOD 30 MIN: CPT

## 2024-09-16 PROCEDURE — G2211 COMPLEX E/M VISIT ADD ON: CPT

## 2024-09-16 PROCEDURE — 93000 ELECTROCARDIOGRAM COMPLETE: CPT | Mod: 59

## 2024-09-16 PROCEDURE — 93288 INTERROG EVL PM/LDLS PM IP: CPT

## 2024-09-16 PROCEDURE — 93000 ELECTROCARDIOGRAM COMPLETE: CPT

## 2024-09-16 RX ORDER — NIFEDIPINE 60 MG/1
60 TABLET, EXTENDED RELEASE ORAL
Qty: 90 | Refills: 1 | Status: ACTIVE | COMMUNITY
Start: 2024-09-16 | End: 1900-01-01

## 2024-09-16 NOTE — ASSESSMENT
[FreeTextEntry1] : Echocardiogram February 22, 2021 demonstrated left ventricle normal size and function with ejection fraction of 55 to 60%.  Mild concentric LVH.  Mildly dilated left atrium.  Moderate to severe aortic stenosis.  Trace mitral, mild aortic and mild tricuspid regurgitation.  Mild dilatation of the aortic root and ascending aorta.  Cardiac catheterization May 5, 2021 demonstrated 60% distal left main stenosis.  70% mid LAD stenosis.  80% ostial circumflex stenosis with 90% OM1 stenosis and a 50% more distal OM1 stenosis.  40% proximal and distal RCA stenoses.  Echocardiogram July 14, 2022 demonstrated left ventricle normal size and function with ejection fraction of 55 to 60%.  Mild concentric LVH.  Normal ventricular size with normal function.  Moderately dilated left atrium.  Mildly dilated right atrium.  Normal functioning bioprosthetic aortic valve.  Mild mitral and mild to moderate tricuspid regurgitation.  Mild dilatation of the aortic root.  Echocardiogram May 23, 2024 demonstrated left ventricle normal size and function with ejection fraction of 60 to 65%.  Grade 1 left ventricular diastolic dysfunction with normal filling pressures.  Mild concentric LVH.  Severe left atrial enlargement, mild right atrial enlargement.  Mild mitral and moderate tricuspid regurgitation.  Normal functioning bioprosthetic aortic valve.  Ascending aorta dilated to 4.1 cm.  Pulmonary pressures mildly elevated at 42 mmHg.  Nuclear stress test May 14, 2024 was a pharmacologic study which was tolerated well.  Blood pressure response to infusion was normal.  EKG was nondiagnostic for ischemia.  Evaluation of nuclear imaging showed no evidence of ischemia or infarct and ejection fraction of 79%.  EKG: Sinus rhythm with no significant ST or T wave changes.  First-degree AV block.  Right bundle branch and left anterior fascicular blocks.  No significant ST or T wave changes.  89-year-old man with a past medical history of hypertension, dyslipidemia, AS status post bio AVR, CAD status post CABG, trifascicular block status post pacemaker, DVT s/p IVC filter who presents to me for f/u.  Patient is essentially asymptomatic at this time.  He has been having some issues with nosebleeds and a little bleeding in his ear.  The bleeding in his ear may have been secondary to scratching his ear but I have told him that if it happens again he should go and have his ear checked.  If he has more issues with nosebleeds he may ultimately need to be cauterized but he will need to remain on Eliquis given his history of multiple DVTs.  Blood pressure is still something of a question but seems to be high.  I have recommended increasing his nighttime nifedipine to 60 mg and he will do so.  He did have some issues with dizziness when taking higher dose of nifedipine earlier in the day.  No evidence of heart for at this time.  EKG appears unchanged.  Interrogation of his pacemaker shows no arrhythmias and a normal functioning device.

## 2024-09-16 NOTE — HISTORY OF PRESENT ILLNESS
[FreeTextEntry1] : Patient presents back to the office today having had some issues with nosebleeds and also 1 time where he had bleeding in his ear.  He says he felt like there was some fluid in his ear and he touched it and there was blood.  He is otherwise feeling well and offers no physical complaints.  He does have some issues with memory.  He has been checking his blood pressure at home but did not bring a list with him here today.  Patient denies chest pain, shortness of breath, palpitations, orthopnea, presyncope, syncope.

## 2024-09-16 NOTE — DISCUSSION/SUMMARY
[FreeTextEntry1] : 1. No additional cardiac testing at this time. 2. Increase nighttime nifedipine dose to 60 mg daily given elevated blood pressure. 3. Continue other current cardiac meds in doses as noted above for hypertension, CAD, AVR, dyslipidemia including atorvastatin 40 mg daily. 4. Follow-up with his primary. 5. Device checks as scheduled. 6. Continue anticoagulation for DVT.  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 If he has more issues with nosebleeds he may need to look into having it cauterized.  If he has more issues with bleeding in the ear he will follow-up with ENT or at least go to urgent care. 7. Monitor BP at home, keep a log and bring to f/u.   8. Patient is encouraged to exercise at least 30 minutes a day every day of the week. 9. Patient encouraged to make sure he hydrates adequates. 10.  Encourage patient to be as active as possible. 11.  Will obtain most recent bloodwork. 12.  Follow up here in three months. [EKG obtained to assist in diagnosis and management of assessed problem(s)] : EKG obtained to assist in diagnosis and management of assessed problem(s)

## 2024-09-25 ENCOUNTER — APPOINTMENT (OUTPATIENT)
Dept: INTERNAL MEDICINE | Facility: CLINIC | Age: 89
End: 2024-09-25
Payer: MEDICARE

## 2024-09-25 VITALS
WEIGHT: 187 LBS | HEART RATE: 69 BPM | SYSTOLIC BLOOD PRESSURE: 152 MMHG | DIASTOLIC BLOOD PRESSURE: 82 MMHG | TEMPERATURE: 97.4 F | OXYGEN SATURATION: 96 % | HEIGHT: 66 IN | BODY MASS INDEX: 30.05 KG/M2

## 2024-09-25 VITALS — DIASTOLIC BLOOD PRESSURE: 80 MMHG | SYSTOLIC BLOOD PRESSURE: 138 MMHG

## 2024-09-25 DIAGNOSIS — I25.10 ATHEROSCLEROTIC HEART DISEASE OF NATIVE CORONARY ARTERY W/OUT ANGINA PECTORIS: ICD-10-CM

## 2024-09-25 DIAGNOSIS — E78.5 HYPERLIPIDEMIA, UNSPECIFIED: ICD-10-CM

## 2024-09-25 DIAGNOSIS — Z95.1 PRESENCE OF AORTOCORONARY BYPASS GRAFT: ICD-10-CM

## 2024-09-25 DIAGNOSIS — I10 ESSENTIAL (PRIMARY) HYPERTENSION: ICD-10-CM

## 2024-09-25 PROCEDURE — G2211 COMPLEX E/M VISIT ADD ON: CPT

## 2024-09-25 PROCEDURE — 99204 OFFICE O/P NEW MOD 45 MIN: CPT

## 2024-09-25 PROCEDURE — 99214 OFFICE O/P EST MOD 30 MIN: CPT

## 2024-09-25 NOTE — HISTORY OF PRESENT ILLNESS
[FreeTextEntry1] : f/u HTN [de-identified] : Pt with h/o HTN -stable on meds , follows with cardiologystopped diuretics - dizziness resolved has echo and nuclear stress test may 2024 - was seen by cardiology  Hyperlipidemia - taking meds as ordered - no myalgias.   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  PreDM - watching his diet  CKD3 - being monitored  s/p Left Hip replacement 2020 approx as per pt doing well, is ambulatory independently was seen by Dr Noble Miramontes and Dr Mcghee for abnormal CT chest .... had  repeat CT in 3 mo - no active intervention advised. here for follow up

## 2024-09-25 NOTE — PLAN
[FreeTextEntry1] : A/P:  HTN:  Stable, advised strict low salt diet, daily regular exercise, to c/w meds, bmp to be monitored closely. HLD:  Advised on strict low-fat diet, daily regular exercise, to c/w meds, FLP/LFT to be monitored. CAD: stable on meds , to f/u with cardiology  f/u after labs

## 2024-10-16 ENCOUNTER — APPOINTMENT (OUTPATIENT)
Dept: INTERNAL MEDICINE | Facility: CLINIC | Age: 89
End: 2024-10-16
Payer: MEDICARE

## 2024-10-16 ENCOUNTER — LABORATORY RESULT (OUTPATIENT)
Age: 89
End: 2024-10-16

## 2024-10-16 PROCEDURE — 36415 COLL VENOUS BLD VENIPUNCTURE: CPT

## 2024-10-23 ENCOUNTER — APPOINTMENT (OUTPATIENT)
Dept: INTERNAL MEDICINE | Facility: CLINIC | Age: 89
End: 2024-10-23
Payer: MEDICARE

## 2024-10-23 VITALS
OXYGEN SATURATION: 96 % | HEART RATE: 66 BPM | SYSTOLIC BLOOD PRESSURE: 172 MMHG | DIASTOLIC BLOOD PRESSURE: 88 MMHG | HEIGHT: 66 IN | BODY MASS INDEX: 30.05 KG/M2 | TEMPERATURE: 96.7 F | WEIGHT: 187 LBS

## 2024-10-23 DIAGNOSIS — R73.03 PREDIABETES.: ICD-10-CM

## 2024-10-23 DIAGNOSIS — N18.31 CHRONIC KIDNEY DISEASE, STAGE 3A: ICD-10-CM

## 2024-10-23 DIAGNOSIS — I10 ESSENTIAL (PRIMARY) HYPERTENSION: ICD-10-CM

## 2024-10-23 DIAGNOSIS — I82.492 ACUTE EMBOLISM AND THROMBOSIS OF OTHER SPECIFIED DEEP VEIN OF LEFT LOWER EXTREMITY: ICD-10-CM

## 2024-10-23 PROCEDURE — G2211 COMPLEX E/M VISIT ADD ON: CPT

## 2024-10-23 PROCEDURE — 99214 OFFICE O/P EST MOD 30 MIN: CPT

## 2024-10-23 RX ORDER — HYDROCHLOROTHIAZIDE 25 MG/1
25 TABLET ORAL DAILY
Qty: 90 | Refills: 2 | Status: ACTIVE | COMMUNITY
Start: 2024-10-23 | End: 1900-01-01

## 2024-11-06 ENCOUNTER — APPOINTMENT (OUTPATIENT)
Dept: INTERNAL MEDICINE | Facility: CLINIC | Age: 89
End: 2024-11-06

## 2024-11-06 PROCEDURE — 36415 COLL VENOUS BLD VENIPUNCTURE: CPT

## 2024-11-20 ENCOUNTER — APPOINTMENT (OUTPATIENT)
Dept: INTERNAL MEDICINE | Facility: CLINIC | Age: 89
End: 2024-11-20
Payer: MEDICARE

## 2024-11-20 VITALS
BODY MASS INDEX: 29.89 KG/M2 | WEIGHT: 186 LBS | TEMPERATURE: 97.2 F | HEIGHT: 66 IN | DIASTOLIC BLOOD PRESSURE: 78 MMHG | SYSTOLIC BLOOD PRESSURE: 130 MMHG | OXYGEN SATURATION: 97 % | HEART RATE: 66 BPM

## 2024-11-20 DIAGNOSIS — N18.31 CHRONIC KIDNEY DISEASE, STAGE 3A: ICD-10-CM

## 2024-11-20 DIAGNOSIS — I10 ESSENTIAL (PRIMARY) HYPERTENSION: ICD-10-CM

## 2024-11-20 PROCEDURE — 99214 OFFICE O/P EST MOD 30 MIN: CPT

## 2024-11-20 PROCEDURE — G2211 COMPLEX E/M VISIT ADD ON: CPT

## 2024-12-09 ENCOUNTER — APPOINTMENT (OUTPATIENT)
Dept: CARDIOLOGY | Facility: CLINIC | Age: 88
End: 2024-12-09
Payer: MEDICARE

## 2024-12-09 ENCOUNTER — NON-APPOINTMENT (OUTPATIENT)
Age: 88
End: 2024-12-09

## 2024-12-09 ENCOUNTER — APPOINTMENT (OUTPATIENT)
Dept: CARDIOLOGY | Facility: CLINIC | Age: 88
End: 2024-12-09

## 2024-12-09 VITALS
HEART RATE: 84 BPM | HEIGHT: 66 IN | RESPIRATION RATE: 16 BRPM | SYSTOLIC BLOOD PRESSURE: 110 MMHG | DIASTOLIC BLOOD PRESSURE: 72 MMHG | WEIGHT: 183 LBS | BODY MASS INDEX: 29.41 KG/M2

## 2024-12-09 DIAGNOSIS — Z95.2 PRESENCE OF PROSTHETIC HEART VALVE: ICD-10-CM

## 2024-12-09 DIAGNOSIS — I35.0 NONRHEUMATIC AORTIC (VALVE) STENOSIS: ICD-10-CM

## 2024-12-09 DIAGNOSIS — I25.10 ATHEROSCLEROTIC HEART DISEASE OF NATIVE CORONARY ARTERY W/OUT ANGINA PECTORIS: ICD-10-CM

## 2024-12-09 DIAGNOSIS — E78.5 HYPERLIPIDEMIA, UNSPECIFIED: ICD-10-CM

## 2024-12-09 DIAGNOSIS — I10 ESSENTIAL (PRIMARY) HYPERTENSION: ICD-10-CM

## 2024-12-09 PROCEDURE — G2211 COMPLEX E/M VISIT ADD ON: CPT

## 2024-12-09 PROCEDURE — 93288 INTERROG EVL PM/LDLS PM IP: CPT

## 2024-12-09 PROCEDURE — 93000 ELECTROCARDIOGRAM COMPLETE: CPT

## 2024-12-09 PROCEDURE — 99214 OFFICE O/P EST MOD 30 MIN: CPT

## 2024-12-10 ENCOUNTER — APPOINTMENT (OUTPATIENT)
Dept: CARDIOLOGY | Facility: CLINIC | Age: 88
End: 2024-12-10
Payer: MEDICARE

## 2024-12-13 PROCEDURE — 93784 AMBL BP MNTR W/SOFTWARE: CPT

## 2024-12-16 ENCOUNTER — NON-APPOINTMENT (OUTPATIENT)
Age: 88
End: 2024-12-16

## 2025-03-21 ENCOUNTER — APPOINTMENT (OUTPATIENT)
Dept: INTERNAL MEDICINE | Facility: CLINIC | Age: 89
End: 2025-03-21
Payer: MEDICARE

## 2025-03-21 VITALS
SYSTOLIC BLOOD PRESSURE: 122 MMHG | WEIGHT: 181 LBS | OXYGEN SATURATION: 97 % | TEMPERATURE: 97.3 F | RESPIRATION RATE: 16 BRPM | BODY MASS INDEX: 29.09 KG/M2 | HEART RATE: 66 BPM | DIASTOLIC BLOOD PRESSURE: 72 MMHG | HEIGHT: 66 IN

## 2025-03-21 DIAGNOSIS — I10 ESSENTIAL (PRIMARY) HYPERTENSION: ICD-10-CM

## 2025-03-21 DIAGNOSIS — R73.03 PREDIABETES.: ICD-10-CM

## 2025-03-21 DIAGNOSIS — I35.0 NONRHEUMATIC AORTIC (VALVE) STENOSIS: ICD-10-CM

## 2025-03-21 DIAGNOSIS — N18.31 CHRONIC KIDNEY DISEASE, STAGE 3A: ICD-10-CM

## 2025-03-21 DIAGNOSIS — E78.5 HYPERLIPIDEMIA, UNSPECIFIED: ICD-10-CM

## 2025-03-21 PROCEDURE — 99214 OFFICE O/P EST MOD 30 MIN: CPT

## 2025-03-21 PROCEDURE — G2211 COMPLEX E/M VISIT ADD ON: CPT

## 2025-03-24 ENCOUNTER — APPOINTMENT (OUTPATIENT)
Dept: INTERNAL MEDICINE | Facility: CLINIC | Age: 89
End: 2025-03-24

## 2025-03-25 LAB
ALBUMIN SERPL ELPH-MCNC: 4.1 G/DL
ALP BLD-CCNC: 124 U/L
ALT SERPL-CCNC: 15 U/L
ANION GAP SERPL CALC-SCNC: 11 MMOL/L
AST SERPL-CCNC: 22 U/L
BASOPHILS # BLD AUTO: 0.02 K/UL
BASOPHILS NFR BLD AUTO: 0.3 %
BILIRUB SERPL-MCNC: 0.6 MG/DL
BUN SERPL-MCNC: 30 MG/DL
CALCIUM SERPL-MCNC: 9.4 MG/DL
CHLORIDE SERPL-SCNC: 97 MMOL/L
CHOLEST SERPL-MCNC: 154 MG/DL
CO2 SERPL-SCNC: 28 MMOL/L
CREAT SERPL-MCNC: 1.54 MG/DL
EGFRCR SERPLBLD CKD-EPI 2021: 43 ML/MIN/1.73M2
EOSINOPHIL # BLD AUTO: 0.14 K/UL
EOSINOPHIL NFR BLD AUTO: 2.1 %
ESTIMATED AVERAGE GLUCOSE: 131 MG/DL
GLUCOSE SERPL-MCNC: 119 MG/DL
HBA1C MFR BLD HPLC: 6.2 %
HCT VFR BLD CALC: 37.8 %
HDLC SERPL-MCNC: 53 MG/DL
HGB BLD-MCNC: 13.4 G/DL
IMM GRANULOCYTES NFR BLD AUTO: 0.6 %
LDLC SERPL-MCNC: 74 MG/DL
LYMPHOCYTES # BLD AUTO: 1.16 K/UL
LYMPHOCYTES NFR BLD AUTO: 17.5 %
MAN DIFF?: NORMAL
MCHC RBC-ENTMCNC: 31.3 PG
MCHC RBC-ENTMCNC: 35.4 G/DL
MCV RBC AUTO: 88.3 FL
MONOCYTES # BLD AUTO: 0.55 K/UL
MONOCYTES NFR BLD AUTO: 8.3 %
NEUTROPHILS # BLD AUTO: 4.7 K/UL
NEUTROPHILS NFR BLD AUTO: 71.2 %
NONHDLC SERPL-MCNC: 101 MG/DL
PLATELET # BLD AUTO: 152 K/UL
POTASSIUM SERPL-SCNC: 3.5 MMOL/L
PROT SERPL-MCNC: 7.2 G/DL
RBC # BLD: 4.28 M/UL
RBC # FLD: 13.6 %
SODIUM SERPL-SCNC: 136 MMOL/L
TRIGL SERPL-MCNC: 163 MG/DL
WBC # FLD AUTO: 6.61 K/UL

## 2025-03-31 ENCOUNTER — APPOINTMENT (OUTPATIENT)
Dept: CARDIOLOGY | Facility: CLINIC | Age: 89
End: 2025-03-31
Payer: MEDICARE

## 2025-03-31 PROCEDURE — 93296 REM INTERROG EVL PM/IDS: CPT

## 2025-03-31 PROCEDURE — 93294 REM INTERROG EVL PM/LDLS PM: CPT

## 2025-05-19 ENCOUNTER — APPOINTMENT (OUTPATIENT)
Dept: CARDIOLOGY | Facility: CLINIC | Age: 89
End: 2025-05-19
Payer: MEDICARE

## 2025-05-19 PROCEDURE — 93288 INTERROG EVL PM/LDLS PM IP: CPT

## 2025-05-22 ENCOUNTER — APPOINTMENT (OUTPATIENT)
Dept: INTERNAL MEDICINE | Facility: CLINIC | Age: 89
End: 2025-05-22
Payer: MEDICARE

## 2025-05-22 VITALS
HEART RATE: 69 BPM | TEMPERATURE: 97.3 F | RESPIRATION RATE: 16 BRPM | WEIGHT: 183 LBS | DIASTOLIC BLOOD PRESSURE: 82 MMHG | OXYGEN SATURATION: 96 % | BODY MASS INDEX: 35.93 KG/M2 | SYSTOLIC BLOOD PRESSURE: 142 MMHG | HEIGHT: 60 IN

## 2025-05-22 DIAGNOSIS — Z87.442 PERSONAL HISTORY OF URINARY CALCULI: ICD-10-CM

## 2025-05-22 DIAGNOSIS — K80.20 CALCULUS OF GALLBLADDER W/OUT CHOLECYSTITIS W/OUT OBSTRUCTION: ICD-10-CM

## 2025-05-22 DIAGNOSIS — I25.10 ATHEROSCLEROTIC HEART DISEASE OF NATIVE CORONARY ARTERY W/OUT ANGINA PECTORIS: ICD-10-CM

## 2025-05-22 DIAGNOSIS — I10 ESSENTIAL (PRIMARY) HYPERTENSION: ICD-10-CM

## 2025-05-22 DIAGNOSIS — N18.31 CHRONIC KIDNEY DISEASE, STAGE 3A: ICD-10-CM

## 2025-05-22 PROCEDURE — 99214 OFFICE O/P EST MOD 30 MIN: CPT

## 2025-05-22 PROCEDURE — G2211 COMPLEX E/M VISIT ADD ON: CPT

## 2025-06-06 ENCOUNTER — NON-APPOINTMENT (OUTPATIENT)
Age: 89
End: 2025-06-06

## 2025-06-06 ENCOUNTER — APPOINTMENT (OUTPATIENT)
Dept: CARDIOLOGY | Facility: CLINIC | Age: 89
End: 2025-06-06
Payer: MEDICARE

## 2025-06-06 ENCOUNTER — APPOINTMENT (OUTPATIENT)
Dept: INTERNAL MEDICINE | Facility: CLINIC | Age: 89
End: 2025-06-06

## 2025-06-06 VITALS
DIASTOLIC BLOOD PRESSURE: 77 MMHG | RESPIRATION RATE: 16 BRPM | SYSTOLIC BLOOD PRESSURE: 137 MMHG | HEIGHT: 60 IN | WEIGHT: 185 LBS | HEART RATE: 67 BPM | BODY MASS INDEX: 36.32 KG/M2

## 2025-06-06 PROCEDURE — 93000 ELECTROCARDIOGRAM COMPLETE: CPT

## 2025-06-06 PROCEDURE — 99214 OFFICE O/P EST MOD 30 MIN: CPT

## 2025-06-25 ENCOUNTER — APPOINTMENT (OUTPATIENT)
Dept: INTERNAL MEDICINE | Facility: CLINIC | Age: 89
End: 2025-06-25
Payer: MEDICARE

## 2025-06-25 ENCOUNTER — LABORATORY RESULT (OUTPATIENT)
Age: 89
End: 2025-06-25

## 2025-06-25 VITALS
DIASTOLIC BLOOD PRESSURE: 70 MMHG | HEIGHT: 60 IN | BODY MASS INDEX: 35.34 KG/M2 | OXYGEN SATURATION: 98 % | SYSTOLIC BLOOD PRESSURE: 118 MMHG | HEART RATE: 72 BPM | WEIGHT: 180 LBS | TEMPERATURE: 97.2 F | RESPIRATION RATE: 18 BRPM

## 2025-06-25 PROBLEM — M54.50 ACUTE LOW BACK PAIN, UNSPECIFIED BACK PAIN LATERALITY, UNSPECIFIED WHETHER SCIATICA PRESENT: Status: ACTIVE | Noted: 2025-06-25 | Resolved: 2025-07-25

## 2025-06-25 PROBLEM — Z12.11 SCREENING FOR COLON CANCER: Status: ACTIVE | Noted: 2025-06-25 | Resolved: 2025-07-25

## 2025-06-25 PROCEDURE — 36415 COLL VENOUS BLD VENIPUNCTURE: CPT

## 2025-06-25 PROCEDURE — 99214 OFFICE O/P EST MOD 30 MIN: CPT

## 2025-06-25 PROCEDURE — G2211 COMPLEX E/M VISIT ADD ON: CPT

## 2025-06-26 ENCOUNTER — APPOINTMENT (OUTPATIENT)
Dept: RADIOLOGY | Facility: CLINIC | Age: 89
End: 2025-06-26
Payer: MEDICARE

## 2025-06-26 ENCOUNTER — OUTPATIENT (OUTPATIENT)
Dept: OUTPATIENT SERVICES | Facility: HOSPITAL | Age: 89
LOS: 1 days | End: 2025-06-26
Payer: MEDICARE

## 2025-06-26 DIAGNOSIS — Z95.828 PRESENCE OF OTHER VASCULAR IMPLANTS AND GRAFTS: Chronic | ICD-10-CM

## 2025-06-26 DIAGNOSIS — Z95.1 PRESENCE OF AORTOCORONARY BYPASS GRAFT: Chronic | ICD-10-CM

## 2025-06-26 DIAGNOSIS — M54.50 LOW BACK PAIN, UNSPECIFIED: ICD-10-CM

## 2025-06-26 DIAGNOSIS — Z98.890 OTHER SPECIFIED POSTPROCEDURAL STATES: Chronic | ICD-10-CM

## 2025-06-26 LAB
ALBUMIN SERPL ELPH-MCNC: 4.2 G/DL
ALP BLD-CCNC: 113 U/L
ALT SERPL-CCNC: 19 U/L
ANION GAP SERPL CALC-SCNC: 14 MMOL/L
APPEARANCE: CLEAR
AST SERPL-CCNC: 24 U/L
BASOPHILS # BLD AUTO: 0.02 K/UL
BASOPHILS NFR BLD AUTO: 0.3 %
BILIRUB SERPL-MCNC: 1 MG/DL
BILIRUBIN URINE: NEGATIVE
BLOOD URINE: NEGATIVE
BUN SERPL-MCNC: 25 MG/DL
CALCIUM SERPL-MCNC: 9.9 MG/DL
CHLORIDE SERPL-SCNC: 96 MMOL/L
CHOLEST SERPL-MCNC: 160 MG/DL
CO2 SERPL-SCNC: 25 MMOL/L
COLOR: YELLOW
CREAT SERPL-MCNC: 1.33 MG/DL
EGFRCR SERPLBLD CKD-EPI 2021: 51 ML/MIN/1.73M2
EOSINOPHIL # BLD AUTO: 0.06 K/UL
EOSINOPHIL NFR BLD AUTO: 1 %
ESTIMATED AVERAGE GLUCOSE: 128 MG/DL
GLUCOSE QUALITATIVE U: NEGATIVE MG/DL
GLUCOSE SERPL-MCNC: 112 MG/DL
HBA1C MFR BLD HPLC: 6.1 %
HCT VFR BLD CALC: 37.6 %
HDLC SERPL-MCNC: 58 MG/DL
HGB BLD-MCNC: 12.7 G/DL
IMM GRANULOCYTES NFR BLD AUTO: 0.5 %
KETONES URINE: NEGATIVE MG/DL
LDLC SERPL-MCNC: 80 MG/DL
LEUKOCYTE ESTERASE URINE: ABNORMAL
LYMPHOCYTES # BLD AUTO: 1.29 K/UL
LYMPHOCYTES NFR BLD AUTO: 21.6 %
MAN DIFF?: NORMAL
MCHC RBC-ENTMCNC: 30.6 PG
MCHC RBC-ENTMCNC: 33.8 G/DL
MCV RBC AUTO: 90.6 FL
MONOCYTES # BLD AUTO: 0.54 K/UL
MONOCYTES NFR BLD AUTO: 9 %
NEUTROPHILS # BLD AUTO: 4.03 K/UL
NEUTROPHILS NFR BLD AUTO: 67.6 %
NITRITE URINE: NEGATIVE
NONHDLC SERPL-MCNC: 102 MG/DL
PH URINE: 6.5
PLATELET # BLD AUTO: 152 K/UL
POTASSIUM SERPL-SCNC: 3.7 MMOL/L
PROT SERPL-MCNC: 7.1 G/DL
PROTEIN URINE: NEGATIVE MG/DL
PSA SERPL-MCNC: 31.2 NG/ML
RBC # BLD: 4.15 M/UL
RBC # FLD: 14.1 %
SODIUM SERPL-SCNC: 135 MMOL/L
SPECIFIC GRAVITY URINE: 1.02
TRIGL SERPL-MCNC: 125 MG/DL
TSH SERPL-ACNC: 1.57 UIU/ML
URATE SERPL-MCNC: 8.6 MG/DL
UROBILINOGEN URINE: 0.2 MG/DL
WBC # FLD AUTO: 5.97 K/UL

## 2025-06-26 PROCEDURE — 73521 X-RAY EXAM HIPS BI 2 VIEWS: CPT | Mod: 26

## 2025-06-26 PROCEDURE — 73521 X-RAY EXAM HIPS BI 2 VIEWS: CPT

## 2025-06-26 PROCEDURE — 72100 X-RAY EXAM L-S SPINE 2/3 VWS: CPT | Mod: 26

## 2025-06-26 PROCEDURE — 72100 X-RAY EXAM L-S SPINE 2/3 VWS: CPT

## 2025-06-26 RX ORDER — SULFAMETHOXAZOLE AND TRIMETHOPRIM 800; 160 MG/1; MG/1
800-160 TABLET ORAL TWICE DAILY
Qty: 28 | Refills: 0 | Status: ACTIVE | COMMUNITY
Start: 2025-06-26 | End: 1900-01-01

## 2025-07-16 ENCOUNTER — APPOINTMENT (OUTPATIENT)
Dept: UROLOGY | Facility: CLINIC | Age: 89
End: 2025-07-16
Payer: MEDICARE

## 2025-07-16 VITALS
SYSTOLIC BLOOD PRESSURE: 122 MMHG | TEMPERATURE: 97.2 F | BODY MASS INDEX: 27.58 KG/M2 | DIASTOLIC BLOOD PRESSURE: 78 MMHG | HEART RATE: 70 BPM | WEIGHT: 182 LBS | HEIGHT: 68 IN

## 2025-07-16 PROCEDURE — 99204 OFFICE O/P NEW MOD 45 MIN: CPT

## 2025-07-16 RX ORDER — TAMSULOSIN HYDROCHLORIDE 0.4 MG/1
0.4 CAPSULE ORAL
Qty: 180 | Refills: 3 | Status: ACTIVE | COMMUNITY
Start: 2025-07-16 | End: 1900-01-01

## 2025-07-21 ENCOUNTER — APPOINTMENT (OUTPATIENT)
Dept: INTERNAL MEDICINE | Facility: CLINIC | Age: 89
End: 2025-07-21
Payer: MEDICARE

## 2025-07-21 VITALS
SYSTOLIC BLOOD PRESSURE: 102 MMHG | RESPIRATION RATE: 16 BRPM | OXYGEN SATURATION: 98 % | WEIGHT: 182 LBS | BODY MASS INDEX: 27.58 KG/M2 | DIASTOLIC BLOOD PRESSURE: 50 MMHG | TEMPERATURE: 97.3 F | HEIGHT: 68 IN | HEART RATE: 60 BPM

## 2025-07-21 DIAGNOSIS — N18.31 CHRONIC KIDNEY DISEASE, STAGE 3A: ICD-10-CM

## 2025-07-21 DIAGNOSIS — I10 ESSENTIAL (PRIMARY) HYPERTENSION: ICD-10-CM

## 2025-07-21 PROCEDURE — G2211 COMPLEX E/M VISIT ADD ON: CPT

## 2025-07-21 PROCEDURE — 99214 OFFICE O/P EST MOD 30 MIN: CPT

## 2025-07-23 ENCOUNTER — APPOINTMENT (OUTPATIENT)
Dept: UROLOGY | Facility: CLINIC | Age: 89
End: 2025-07-23
Payer: MEDICARE

## 2025-07-23 VITALS
WEIGHT: 182 LBS | SYSTOLIC BLOOD PRESSURE: 121 MMHG | HEART RATE: 60 BPM | TEMPERATURE: 97.3 F | BODY MASS INDEX: 27.58 KG/M2 | HEIGHT: 68 IN | DIASTOLIC BLOOD PRESSURE: 71 MMHG

## 2025-07-23 DIAGNOSIS — R39.9 UNSPECIFIED SYMPTOMS AND SIGNS INVOLVING THE GENITOURINARY SYSTEM: ICD-10-CM

## 2025-07-23 LAB
APPEARANCE: CLEAR
BACTERIA UR CULT: NORMAL
BACTERIA: NEGATIVE /HPF
BILIRUBIN URINE: NEGATIVE
BLOOD URINE: NEGATIVE
CAST: 0 /LPF
COLOR: YELLOW
EPITHELIAL CELLS: 0 /HPF
GLUCOSE QUALITATIVE U: NEGATIVE MG/DL
KETONES URINE: NEGATIVE MG/DL
LEUKOCYTE ESTERASE URINE: NEGATIVE
MICROSCOPIC-UA: NORMAL
NITRITE URINE: NEGATIVE
PH URINE: 6.5
PROTEIN URINE: NEGATIVE MG/DL
PSA FREE FLD-MCNC: 8 %
PSA FREE SERPL-MCNC: 3.5 NG/ML
PSA SERPL-MCNC: 41.8 NG/ML
RED BLOOD CELLS URINE: 0 /HPF
SPECIFIC GRAVITY URINE: 1.01
UROBILINOGEN URINE: 0.2 MG/DL
WHITE BLOOD CELLS URINE: 0 /HPF

## 2025-07-23 PROCEDURE — 99213 OFFICE O/P EST LOW 20 MIN: CPT

## 2025-07-31 ENCOUNTER — APPOINTMENT (OUTPATIENT)
Dept: UROLOGY | Facility: CLINIC | Age: 89
End: 2025-07-31
Payer: MEDICARE

## 2025-07-31 VITALS
HEART RATE: 66 BPM | SYSTOLIC BLOOD PRESSURE: 100 MMHG | DIASTOLIC BLOOD PRESSURE: 55 MMHG | WEIGHT: 182 LBS | HEIGHT: 68 IN | TEMPERATURE: 98 F | BODY MASS INDEX: 27.58 KG/M2

## 2025-07-31 DIAGNOSIS — R97.20 ELEVATED PROSTATE, SPECIFIC ANTIGEN [PSA]: ICD-10-CM

## 2025-07-31 DIAGNOSIS — R68.89 OTHER GENERAL SYMPTOMS AND SIGNS: ICD-10-CM

## 2025-07-31 PROCEDURE — 99214 OFFICE O/P EST MOD 30 MIN: CPT

## 2025-08-28 ENCOUNTER — APPOINTMENT (OUTPATIENT)
Dept: NUCLEAR MEDICINE | Facility: CLINIC | Age: 89
End: 2025-08-28
Payer: MEDICARE

## 2025-08-28 ENCOUNTER — APPOINTMENT (OUTPATIENT)
Dept: UROLOGY | Facility: CLINIC | Age: 89
End: 2025-08-28

## 2025-08-28 PROCEDURE — 78816 PET IMAGE W/CT FULL BODY: CPT | Mod: 26,PI

## 2025-08-28 PROCEDURE — A9595: CPT

## 2025-08-28 PROCEDURE — 78816 PET IMAGE W/CT FULL BODY: CPT | Mod: TC,PI

## 2025-09-02 ENCOUNTER — APPOINTMENT (OUTPATIENT)
Dept: INTERNAL MEDICINE | Facility: CLINIC | Age: 89
End: 2025-09-02
Payer: MEDICARE

## 2025-09-02 PROCEDURE — 36415 COLL VENOUS BLD VENIPUNCTURE: CPT

## 2025-09-03 LAB
25(OH)D3 SERPL-MCNC: 54.2 NG/ML
ALBUMIN SERPL ELPH-MCNC: 4.3 G/DL
ALP BLD-CCNC: 106 U/L
ALT SERPL-CCNC: 17 U/L
ANION GAP SERPL CALC-SCNC: 11 MMOL/L
AST SERPL-CCNC: 23 U/L
BASOPHILS # BLD AUTO: 0.02 K/UL
BASOPHILS NFR BLD AUTO: 0.3 %
BILIRUB SERPL-MCNC: 0.8 MG/DL
BUN SERPL-MCNC: 23 MG/DL
CALCIUM SERPL-MCNC: 10.1 MG/DL
CHLORIDE SERPL-SCNC: 100 MMOL/L
CHOLEST SERPL-MCNC: 160 MG/DL
CO2 SERPL-SCNC: 27 MMOL/L
CREAT SERPL-MCNC: 1.18 MG/DL
EGFRCR SERPLBLD CKD-EPI 2021: 59 ML/MIN/1.73M2
EOSINOPHIL # BLD AUTO: 0.09 K/UL
EOSINOPHIL NFR BLD AUTO: 1.5 %
ESTIMATED AVERAGE GLUCOSE: 117 MG/DL
GLUCOSE SERPL-MCNC: 114 MG/DL
HBA1C MFR BLD HPLC: 5.7 %
HCT VFR BLD CALC: 40.4 %
HDLC SERPL-MCNC: 57 MG/DL
HGB BLD-MCNC: 13.3 G/DL
IMM GRANULOCYTES NFR BLD AUTO: 0.3 %
LDLC SERPL-MCNC: 80 MG/DL
LYMPHOCYTES # BLD AUTO: 1.24 K/UL
LYMPHOCYTES NFR BLD AUTO: 21.3 %
MAN DIFF?: NORMAL
MCHC RBC-ENTMCNC: 30.6 PG
MCHC RBC-ENTMCNC: 32.9 G/DL
MCV RBC AUTO: 92.9 FL
MONOCYTES # BLD AUTO: 0.51 K/UL
MONOCYTES NFR BLD AUTO: 8.8 %
NEUTROPHILS # BLD AUTO: 3.94 K/UL
NEUTROPHILS NFR BLD AUTO: 67.8 %
NONHDLC SERPL-MCNC: 103 MG/DL
PLATELET # BLD AUTO: 154 K/UL
POTASSIUM SERPL-SCNC: 4.6 MMOL/L
PROT SERPL-MCNC: 7.4 G/DL
RBC # BLD: 4.35 M/UL
RBC # FLD: 14.4 %
SODIUM SERPL-SCNC: 139 MMOL/L
TRIGL SERPL-MCNC: 132 MG/DL
URATE SERPL-MCNC: 6.6 MG/DL
WBC # FLD AUTO: 5.82 K/UL

## 2025-09-09 ENCOUNTER — APPOINTMENT (OUTPATIENT)
Dept: INTERNAL MEDICINE | Facility: CLINIC | Age: 89
End: 2025-09-09
Payer: MEDICARE

## 2025-09-09 VITALS
RESPIRATION RATE: 16 BRPM | BODY MASS INDEX: 28.64 KG/M2 | TEMPERATURE: 97 F | SYSTOLIC BLOOD PRESSURE: 128 MMHG | HEIGHT: 68 IN | WEIGHT: 189 LBS | OXYGEN SATURATION: 96 % | HEART RATE: 61 BPM | DIASTOLIC BLOOD PRESSURE: 70 MMHG

## 2025-09-09 DIAGNOSIS — N18.31 CHRONIC KIDNEY DISEASE, STAGE 3A: ICD-10-CM

## 2025-09-09 PROCEDURE — G2211 COMPLEX E/M VISIT ADD ON: CPT

## 2025-09-09 PROCEDURE — 99214 OFFICE O/P EST MOD 30 MIN: CPT

## 2025-09-16 ENCOUNTER — APPOINTMENT (OUTPATIENT)
Dept: CARDIOLOGY | Facility: CLINIC | Age: 89
End: 2025-09-16
Payer: MEDICARE

## 2025-09-16 VITALS
HEIGHT: 68 IN | RESPIRATION RATE: 16 BRPM | DIASTOLIC BLOOD PRESSURE: 70 MMHG | BODY MASS INDEX: 28.34 KG/M2 | WEIGHT: 187 LBS | SYSTOLIC BLOOD PRESSURE: 138 MMHG | HEART RATE: 65 BPM

## 2025-09-16 DIAGNOSIS — I35.0 NONRHEUMATIC AORTIC (VALVE) STENOSIS: ICD-10-CM

## 2025-09-16 DIAGNOSIS — I25.10 ATHEROSCLEROTIC HEART DISEASE OF NATIVE CORONARY ARTERY W/OUT ANGINA PECTORIS: ICD-10-CM

## 2025-09-16 DIAGNOSIS — E78.5 HYPERLIPIDEMIA, UNSPECIFIED: ICD-10-CM

## 2025-09-16 DIAGNOSIS — I10 ESSENTIAL (PRIMARY) HYPERTENSION: ICD-10-CM

## 2025-09-16 DIAGNOSIS — Z95.0 PRESENCE OF CARDIAC PACEMAKER: ICD-10-CM

## 2025-09-16 PROCEDURE — 99214 OFFICE O/P EST MOD 30 MIN: CPT

## 2025-09-16 PROCEDURE — 93288 INTERROG EVL PM/LDLS PM IP: CPT

## 2025-09-16 PROCEDURE — 93000 ELECTROCARDIOGRAM COMPLETE: CPT | Mod: XU

## 2025-09-17 ENCOUNTER — APPOINTMENT (OUTPATIENT)
Dept: UROLOGY | Facility: CLINIC | Age: 89
End: 2025-09-17
Payer: MEDICARE

## 2025-09-17 VITALS
OXYGEN SATURATION: 96 % | WEIGHT: 187 LBS | DIASTOLIC BLOOD PRESSURE: 78 MMHG | BODY MASS INDEX: 28.34 KG/M2 | SYSTOLIC BLOOD PRESSURE: 148 MMHG | HEART RATE: 67 BPM | HEIGHT: 68 IN

## 2025-09-17 DIAGNOSIS — R39.9 UNSPECIFIED SYMPTOMS AND SIGNS INVOLVING THE GENITOURINARY SYSTEM: ICD-10-CM

## 2025-09-17 PROCEDURE — 99213 OFFICE O/P EST LOW 20 MIN: CPT

## 2025-09-18 ENCOUNTER — APPOINTMENT (OUTPATIENT)
Dept: UROLOGY | Facility: CLINIC | Age: 89
End: 2025-09-18
Payer: MEDICARE

## 2025-09-18 VITALS
WEIGHT: 187 LBS | DIASTOLIC BLOOD PRESSURE: 74 MMHG | BODY MASS INDEX: 28.34 KG/M2 | SYSTOLIC BLOOD PRESSURE: 127 MMHG | HEIGHT: 68 IN | HEART RATE: 69 BPM

## 2025-09-18 DIAGNOSIS — R97.20 ELEVATED PROSTATE, SPECIFIC ANTIGEN [PSA]: ICD-10-CM

## 2025-09-18 PROCEDURE — 99214 OFFICE O/P EST MOD 30 MIN: CPT

## 2025-09-18 PROCEDURE — G2211 COMPLEX E/M VISIT ADD ON: CPT
